# Patient Record
Sex: MALE | Race: WHITE | NOT HISPANIC OR LATINO | Employment: UNEMPLOYED | URBAN - METROPOLITAN AREA
[De-identification: names, ages, dates, MRNs, and addresses within clinical notes are randomized per-mention and may not be internally consistent; named-entity substitution may affect disease eponyms.]

---

## 2017-07-10 ENCOUNTER — HOSPITAL ENCOUNTER (OUTPATIENT)
Dept: GENERAL RADIOLOGY | Facility: HOSPITAL | Age: 62
Discharge: HOME | End: 2017-07-10

## 2017-09-20 ENCOUNTER — ON CAMPUS - OUTPATIENT (AMBULATORY)
Dept: URBAN - METROPOLITAN AREA HOSPITAL 2 | Facility: HOSPITAL | Age: 62
End: 2017-09-20
Payer: MEDICARE

## 2017-09-20 ENCOUNTER — ON CAMPUS - OUTPATIENT (AMBULATORY)
Dept: URBAN - METROPOLITAN AREA HOSPITAL 2 | Facility: HOSPITAL | Age: 62
End: 2017-09-20

## 2017-09-20 ENCOUNTER — OFFICE (AMBULATORY)
Dept: URBAN - METROPOLITAN AREA CLINIC 64 | Facility: CLINIC | Age: 62
End: 2017-09-20

## 2017-09-20 ENCOUNTER — HOSPITAL ENCOUNTER (OUTPATIENT)
Dept: OTHER | Facility: HOSPITAL | Age: 62
Setting detail: SPECIMEN
Discharge: HOME | End: 2017-09-20
Attending: INTERNAL MEDICINE | Admitting: INTERNAL MEDICINE

## 2017-09-20 VITALS
DIASTOLIC BLOOD PRESSURE: 73 MMHG | DIASTOLIC BLOOD PRESSURE: 71 MMHG | DIASTOLIC BLOOD PRESSURE: 78 MMHG | SYSTOLIC BLOOD PRESSURE: 118 MMHG | HEART RATE: 91 BPM | SYSTOLIC BLOOD PRESSURE: 94 MMHG | OXYGEN SATURATION: 97 % | SYSTOLIC BLOOD PRESSURE: 107 MMHG | HEART RATE: 97 BPM | RESPIRATION RATE: 19 BRPM | HEART RATE: 105 BPM | HEART RATE: 98 BPM | RESPIRATION RATE: 19 BRPM | WEIGHT: 145.2 LBS | HEART RATE: 84 BPM | DIASTOLIC BLOOD PRESSURE: 71 MMHG | DIASTOLIC BLOOD PRESSURE: 58 MMHG | DIASTOLIC BLOOD PRESSURE: 67 MMHG | HEART RATE: 82 BPM | DIASTOLIC BLOOD PRESSURE: 73 MMHG | SYSTOLIC BLOOD PRESSURE: 118 MMHG | SYSTOLIC BLOOD PRESSURE: 94 MMHG | RESPIRATION RATE: 15 BRPM | TEMPERATURE: 97.9 F | SYSTOLIC BLOOD PRESSURE: 105 MMHG | HEART RATE: 82 BPM | SYSTOLIC BLOOD PRESSURE: 119 MMHG | SYSTOLIC BLOOD PRESSURE: 119 MMHG | OXYGEN SATURATION: 97 % | OXYGEN SATURATION: 99 % | SYSTOLIC BLOOD PRESSURE: 118 MMHG | DIASTOLIC BLOOD PRESSURE: 68 MMHG | OXYGEN SATURATION: 99 % | SYSTOLIC BLOOD PRESSURE: 107 MMHG | HEART RATE: 97 BPM | RESPIRATION RATE: 16 BRPM | RESPIRATION RATE: 18 BRPM | OXYGEN SATURATION: 96 % | OXYGEN SATURATION: 98 % | RESPIRATION RATE: 15 BRPM | HEART RATE: 105 BPM | OXYGEN SATURATION: 100 % | HEART RATE: 105 BPM | OXYGEN SATURATION: 100 % | DIASTOLIC BLOOD PRESSURE: 68 MMHG | DIASTOLIC BLOOD PRESSURE: 67 MMHG | DIASTOLIC BLOOD PRESSURE: 68 MMHG | DIASTOLIC BLOOD PRESSURE: 77 MMHG | OXYGEN SATURATION: 100 % | OXYGEN SATURATION: 98 % | SYSTOLIC BLOOD PRESSURE: 114 MMHG | DIASTOLIC BLOOD PRESSURE: 71 MMHG | DIASTOLIC BLOOD PRESSURE: 69 MMHG | SYSTOLIC BLOOD PRESSURE: 94 MMHG | HEART RATE: 84 BPM | OXYGEN SATURATION: 98 % | RESPIRATION RATE: 18 BRPM | OXYGEN SATURATION: 96 % | SYSTOLIC BLOOD PRESSURE: 107 MMHG | WEIGHT: 145.2 LBS | RESPIRATION RATE: 18 BRPM | HEIGHT: 71 IN | SYSTOLIC BLOOD PRESSURE: 114 MMHG | OXYGEN SATURATION: 96 % | SYSTOLIC BLOOD PRESSURE: 108 MMHG | SYSTOLIC BLOOD PRESSURE: 119 MMHG | RESPIRATION RATE: 16 BRPM | OXYGEN SATURATION: 99 % | HEART RATE: 91 BPM | HEART RATE: 97 BPM | HEART RATE: 91 BPM | DIASTOLIC BLOOD PRESSURE: 77 MMHG | SYSTOLIC BLOOD PRESSURE: 108 MMHG | DIASTOLIC BLOOD PRESSURE: 67 MMHG | SYSTOLIC BLOOD PRESSURE: 106 MMHG | SYSTOLIC BLOOD PRESSURE: 108 MMHG | HEART RATE: 84 BPM | HEIGHT: 71 IN | HEART RATE: 80 BPM | DIASTOLIC BLOOD PRESSURE: 73 MMHG | TEMPERATURE: 97.9 F | SYSTOLIC BLOOD PRESSURE: 106 MMHG | RESPIRATION RATE: 15 BRPM | SYSTOLIC BLOOD PRESSURE: 114 MMHG | OXYGEN SATURATION: 97 % | DIASTOLIC BLOOD PRESSURE: 78 MMHG | RESPIRATION RATE: 16 BRPM | HEART RATE: 80 BPM | SYSTOLIC BLOOD PRESSURE: 105 MMHG | HEART RATE: 82 BPM | WEIGHT: 145.2 LBS | RESPIRATION RATE: 19 BRPM | SYSTOLIC BLOOD PRESSURE: 105 MMHG | TEMPERATURE: 97.9 F | DIASTOLIC BLOOD PRESSURE: 58 MMHG | DIASTOLIC BLOOD PRESSURE: 58 MMHG | HEART RATE: 98 BPM | HEIGHT: 71 IN | DIASTOLIC BLOOD PRESSURE: 69 MMHG | DIASTOLIC BLOOD PRESSURE: 69 MMHG | HEART RATE: 80 BPM | HEART RATE: 98 BPM | SYSTOLIC BLOOD PRESSURE: 106 MMHG | DIASTOLIC BLOOD PRESSURE: 77 MMHG | DIASTOLIC BLOOD PRESSURE: 78 MMHG

## 2017-09-20 DIAGNOSIS — R11.0 NAUSEA: ICD-10-CM

## 2017-09-20 DIAGNOSIS — K29.70 GASTRITIS, UNSPECIFIED, WITHOUT BLEEDING: ICD-10-CM

## 2017-09-20 DIAGNOSIS — K22.5 DIVERTICULUM OF ESOPHAGUS, ACQUIRED: ICD-10-CM

## 2017-09-20 DIAGNOSIS — Z12.11 ENCOUNTER FOR SCREENING FOR MALIGNANT NEOPLASM OF COLON: ICD-10-CM

## 2017-09-20 DIAGNOSIS — B96.81 HELICOBACTER PYLORI [H. PYLORI] AS THE CAUSE OF DISEASES CLA: ICD-10-CM

## 2017-09-20 DIAGNOSIS — K90.0 CELIAC DISEASE: ICD-10-CM

## 2017-09-20 DIAGNOSIS — K29.50 UNSPECIFIED CHRONIC GASTRITIS WITHOUT BLEEDING: ICD-10-CM

## 2017-09-20 LAB
GI HISTOLOGY: A. SELECT: (no result)
GI HISTOLOGY: B. SELECT: (no result)
GI HISTOLOGY: C. UNSPECIFIED: (no result)
GI HISTOLOGY: PDF REPORT: (no result)

## 2017-09-20 PROCEDURE — 88305 TISSUE EXAM BY PATHOLOGIST: CPT | Mod: 26

## 2017-09-20 PROCEDURE — 45380 COLONOSCOPY AND BIOPSY: CPT | Mod: PT

## 2017-09-20 PROCEDURE — G0121 COLON CA SCRN NOT HI RSK IND: HCPCS

## 2017-09-20 PROCEDURE — 43239 EGD BIOPSY SINGLE/MULTIPLE: CPT | Mod: 59

## 2017-09-20 PROCEDURE — 88342 IMHCHEM/IMCYTCHM 1ST ANTB: CPT | Mod: 26

## 2017-09-20 RX ADMIN — PROPOFOL: 10 INJECTION, EMULSION INTRAVENOUS at 14:33

## 2018-07-26 ENCOUNTER — HOSPITAL ENCOUNTER (OUTPATIENT)
Dept: LAB | Facility: HOSPITAL | Age: 63
Discharge: HOME | End: 2018-07-26

## 2018-07-26 LAB
ALBUMIN SERPL-MCNC: 3.8 G/DL (ref 3.5–4.8)
ALBUMIN/GLOB SERPL: 2 {RATIO} (ref 1–1.7)
ALP SERPL-CCNC: 66 IU/L (ref 32–91)
ALT SERPL-CCNC: 17 IU/L (ref 17–63)
ANION GAP SERPL CALC-SCNC: 8.5 MMOL/L (ref 10–20)
AST SERPL-CCNC: 19 IU/L (ref 15–41)
BASOPHILS # BLD AUTO: 0 10*3/UL (ref 0–0.2)
BASOPHILS NFR BLD AUTO: 1 % (ref 0–2)
BILIRUB SERPL-MCNC: 0.6 MG/DL (ref 0.3–1.2)
BUN SERPL-MCNC: 6 MG/DL (ref 8–20)
BUN/CREAT SERPL: 6.7 (ref 6.2–20.3)
CALCIUM SERPL-MCNC: 8.9 MG/DL (ref 8.9–10.3)
CHLORIDE SERPL-SCNC: 102 MMOL/L (ref 101–111)
CONV CO2: 26 MMOL/L (ref 22–32)
CONV TOTAL PROTEIN: 5.7 G/DL (ref 6.1–7.9)
CREAT UR-MCNC: 0.9 MG/DL (ref 0.7–1.2)
DIFFERENTIAL METHOD BLD: (no result)
EOSINOPHIL # BLD AUTO: 0.1 10*3/UL (ref 0–0.3)
EOSINOPHIL # BLD AUTO: 1 % (ref 0–3)
ERYTHROCYTE [DISTWIDTH] IN BLOOD BY AUTOMATED COUNT: 14.4 % (ref 11.5–14.5)
GLOBULIN UR ELPH-MCNC: 1.9 G/DL (ref 2.5–3.8)
GLUCOSE SERPL-MCNC: 101 MG/DL (ref 65–99)
HCT VFR BLD AUTO: 36.3 % (ref 40–54)
HGB BLD-MCNC: 12.3 G/DL (ref 14–18)
LYMPHOCYTES # BLD AUTO: 1.9 10*3/UL (ref 0.8–4.8)
LYMPHOCYTES NFR BLD AUTO: 37 % (ref 18–42)
MCH RBC QN AUTO: 31.7 PG (ref 26–32)
MCHC RBC AUTO-ENTMCNC: 34 G/DL (ref 32–36)
MCV RBC AUTO: 93.2 FL (ref 80–94)
MONOCYTES # BLD AUTO: 0.4 10*3/UL (ref 0.1–1.3)
MONOCYTES NFR BLD AUTO: 7 % (ref 2–11)
NEUTROPHILS # BLD AUTO: 2.8 10*3/UL (ref 2.3–8.6)
NEUTROPHILS NFR BLD AUTO: 54 % (ref 50–75)
NRBC BLD AUTO-RTO: 0 /100{WBCS}
NRBC/RBC NFR BLD MANUAL: 0 10*3/UL
PLATELET # BLD AUTO: 158 10*3/UL (ref 150–450)
PMV BLD AUTO: 9.1 FL (ref 7.4–10.4)
POTASSIUM SERPL-SCNC: 4.5 MMOL/L (ref 3.6–5.1)
RBC # BLD AUTO: 3.89 10*6/UL (ref 4.6–6)
SODIUM SERPL-SCNC: 132 MMOL/L (ref 136–144)
WBC # BLD AUTO: 5.2 10*3/UL (ref 4.5–11.5)

## 2020-05-20 ENCOUNTER — OFFICE VISIT (OUTPATIENT)
Dept: NEUROSURGERY | Facility: CLINIC | Age: 65
End: 2020-05-20

## 2020-05-20 VITALS
HEIGHT: 70 IN | DIASTOLIC BLOOD PRESSURE: 79 MMHG | BODY MASS INDEX: 27.77 KG/M2 | SYSTOLIC BLOOD PRESSURE: 149 MMHG | HEART RATE: 89 BPM | WEIGHT: 194 LBS

## 2020-05-20 DIAGNOSIS — M47.12 CERVICAL SPONDYLOSIS WITH MYELOPATHY: ICD-10-CM

## 2020-05-20 DIAGNOSIS — G89.29 CHRONIC BILATERAL LOW BACK PAIN WITH BILATERAL SCIATICA: Primary | ICD-10-CM

## 2020-05-20 DIAGNOSIS — Z98.1 HISTORY OF FUSION OF CERVICAL SPINE: ICD-10-CM

## 2020-05-20 DIAGNOSIS — M54.41 CHRONIC BILATERAL LOW BACK PAIN WITH BILATERAL SCIATICA: Primary | ICD-10-CM

## 2020-05-20 DIAGNOSIS — M54.42 CHRONIC BILATERAL LOW BACK PAIN WITH BILATERAL SCIATICA: Primary | ICD-10-CM

## 2020-05-20 PROCEDURE — 99203 OFFICE O/P NEW LOW 30 MIN: CPT | Performed by: NURSE PRACTITIONER

## 2020-05-20 RX ORDER — FLUTICASONE PROPIONATE 50 MCG
1 SPRAY, SUSPENSION (ML) NASAL DAILY
COMMUNITY
Start: 2020-04-03

## 2020-05-20 RX ORDER — TRAZODONE HYDROCHLORIDE 150 MG/1
TABLET ORAL
COMMUNITY
Start: 2013-06-19 | End: 2021-03-19 | Stop reason: ALTCHOICE

## 2020-05-20 RX ORDER — MIRTAZAPINE 15 MG/1
15 TABLET, FILM COATED ORAL NIGHTLY
COMMUNITY
Start: 2020-04-03

## 2020-05-20 RX ORDER — CITALOPRAM 20 MG/1
20 TABLET ORAL NIGHTLY
COMMUNITY
Start: 2004-07-20

## 2020-05-20 RX ORDER — TIZANIDINE 4 MG/1
4 TABLET ORAL NIGHTLY
COMMUNITY
Start: 2020-04-04

## 2020-05-20 RX ORDER — MELOXICAM 15 MG/1
15 TABLET ORAL DAILY PRN
COMMUNITY
Start: 2020-04-03

## 2020-05-20 RX ORDER — TAMSULOSIN HYDROCHLORIDE 0.4 MG/1
1 CAPSULE ORAL NIGHTLY
COMMUNITY
Start: 2020-04-03

## 2020-05-20 NOTE — PROGRESS NOTES
"Subjective   History of Present Illness: Jersey Celaya is a 64 y.o. male is here today as a transfer of care at the request of Mr Celaya    History of Present Illness     He is a former patient of Dr Ulloa and new to me. Prior records were reviewed. He is here today complaining of low back pain radiating into bilateral buttock and groin, down both legs and into the feet. He says he gets numbness in both legs and feet. He says both great toes will cramp at times. This started with back and right thigh pain but has progressed to involve both legs. This started two or three years ago. He has had chiropractic treatment, physical therapy and pain management over the past three years. He says he has had two series of injections into his low back including ablations, but he did not get any relief of the back or leg symptoms. He takes muscle relaxants and mobic for his back. He denies bowel or bladder dysfunction but he states he does take medication for \"low flow\".   He bends forward for temporary relief of the back and leg pain.  Reviewing records he has an extensive history with the cervical spine.  It appears he had a prior cervical fusion and he states this was with Dr. Ulloa.  He says he has had chronic neck pain since the cervical surgery and has had progressive imbalance more recently.  He also complains of numbness in both hands worse on the right.        The following portions of the patient's history were reviewed and updated as appropriate: allergies, current medications, past family history, past medical history, past social history, past surgical history and problem list.    Review of Systems   Constitutional: Negative for fever.   Cardiovascular: Negative for chest pain.   Gastrointestinal: Negative for abdominal pain.   Genitourinary: Negative for dysuria.   Musculoskeletal: Positive for back pain.   Neurological: Positive for numbness. Negative for weakness and headaches.   All other systems reviewed " "and are negative.      Objective     ./79 (BP Location: Left arm, Patient Position: Sitting, Cuff Size: Adult)   Pulse 89   Ht 177.8 cm (70\")   Wt 88 kg (194 lb)   BMI 27.84 kg/m²    Body mass index is 27.84 kg/m².    Answers for HPI/ROS submitted by the patient on 5/15/2020   Back pain  What is the primary reason for your visit?: Back Pain    Physical Exam   Constitutional: He is oriented to person, place, and time. He appears well-developed and well-nourished.   HENT:   Head: Normocephalic.   Eyes: Pupils are equal, round, and reactive to light. EOM are normal.   Neck: Normal range of motion.   Cardiovascular: Normal rate.   Pulmonary/Chest: Effort normal.   Musculoskeletal: Normal range of motion.   Neurological: He is oriented to person, place, and time. He has an abnormal Romberg Test and an abnormal Tandem Gait Test. Gait normal.   Reflex Scores:       Tricep reflexes are 3+ on the right side and 3+ on the left side.       Bicep reflexes are 3+ on the right side and 3+ on the left side.       Brachioradialis reflexes are 3+ on the right side and 3+ on the left side.       Patellar reflexes are 3+ on the right side and 3+ on the left side.       Achilles reflexes are 2+ on the right side and 2+ on the left side.  Skin: Skin is warm and dry.   Psychiatric: He has a normal mood and affect. His speech is normal.   Vitals reviewed.    Neurologic Exam     Mental Status   Oriented to person, place, and time.   Speech: speech is normal   Level of consciousness: alert    Cranial Nerves     CN III, IV, VI   Pupils are equal, round, and reactive to light.  Extraocular motions are normal.     Sensory Exam   Sensory deficit distribution on right: C7  Sensory deficit distribution on left: C8    Gait, Coordination, and Reflexes     Gait  Gait: normal    Coordination   Romberg: positive  Tandem walking coordination: abnormal    Reflexes   Right brachioradialis: 3+  Left brachioradialis: 3+  Right biceps: 3+  Left " biceps: 3+  Right triceps: 3+  Left triceps: 3+  Right patellar: 3+  Left patellar: 3+  Right achilles: 2+  Left achilles: 2+  Right Thurston: absent  Left Thurston: absent  Right ankle clonus: absent  Left ankle clonus: absent      SLRT Positive bilaterally  Theo's Test negative bilaterally  Tinel's positive at left wrist  Spurling's causes neck pain only      Assessment/Plan   Independent Review of Radiographic Studies:      I personally reviewed the images from the following studies.    Cervical MRI report from [facility] dated October 23, 2018 was read as chronic encephalomalacia the right side the cord at C3-C4 and mild chronic cord compression at that level mild to moderate narrowing of the spinal canal at C4-5 and C5-C6 with prior fusion C3-C6 with corpectomy at C4 and interbody device at C5-C6 and anterior osseous bridging at C6-C7    Lumbar MRI report from [facility] dated October 23, 2018 was read as disc bulge and osteophyte formation with mild bilateral facet and ligament flavum hypertrophy contributing to mild bilateral foraminal narrowing and no significant central stenosis at L4-5, mild facet arthropathy L2-3 and L3-4, no other areas of significant canal or foraminal stenosis identified    I did not have access to the imaging of the above MRIs    Medical Decision Making:      He came in today complaining of the back and leg symptoms, but based on the exam findings we also discussed his neck and arm symptoms.  He had a physical exam documented by a neurosurgeon in 2013 which did not reveal any myelopathic findings however today he has hyperreflexia and abnormal tandem gait.  He does complain of imbalance.  This may all be related to his prior cervical fusion with Dr. Ulloa and the cervical MRI report does report chronic myelomalacia, but he also has progressive arm numbness.  The leg symptoms he describes are consistent with neurogenic claudication which that would not correlate with the lumbar  MRI from 18 months ago.  He has exhausted conservative treatment with chiropractic care, physical therapy and pain management over the past 3 years.  We will update imaging with cervical and lumbar MRIs for comparison to the MRIs in 2018 and bring him back to discuss the results and treatment options.  He was advised today to quit smoking as well.    While in the room and during my examination of the patient I wore a mask and eye protection.  I washed my hands before and after this patient encounter.  The patient was also wearing a mask.    Jersey was seen today for back pain.    Diagnoses and all orders for this visit:    Chronic bilateral low back pain with bilateral sciatica  -     MRI Lumbar Spine Without Contrast; Future    History of fusion of cervical spine  -     MRI Cervical Spine Without Contrast; Future    Cervical spondylosis with myelopathy  -     MRI Cervical Spine Without Contrast; Future      Return for After radiographic tests.

## 2020-06-10 ENCOUNTER — OFFICE VISIT (OUTPATIENT)
Dept: NEUROSURGERY | Facility: CLINIC | Age: 65
End: 2020-06-10

## 2020-06-10 VITALS
HEART RATE: 82 BPM | RESPIRATION RATE: 18 BRPM | SYSTOLIC BLOOD PRESSURE: 114 MMHG | WEIGHT: 190 LBS | HEIGHT: 70 IN | BODY MASS INDEX: 27.2 KG/M2 | DIASTOLIC BLOOD PRESSURE: 72 MMHG

## 2020-06-10 DIAGNOSIS — M47.12 CERVICAL SPONDYLOSIS WITH MYELOPATHY: Primary | ICD-10-CM

## 2020-06-10 PROCEDURE — 99213 OFFICE O/P EST LOW 20 MIN: CPT | Performed by: NEUROLOGICAL SURGERY

## 2020-06-10 RX ORDER — OXYCODONE AND ACETAMINOPHEN 10; 325 MG/1; MG/1
1 TABLET ORAL EVERY 8 HOURS PRN
COMMUNITY
End: 2021-03-19 | Stop reason: ALTCHOICE

## 2020-06-10 NOTE — PROGRESS NOTES
"Subjective   Jersey Celaya is a 64 y.o. male.     Chief Complaint   Patient presents with   • Follow-up     f/y after MRI      Visit Vitals  /72 (BP Location: Right arm, Patient Position: Sitting, Cuff Size: Large Adult)   Pulse 82   Resp 18   Ht 177.8 cm (70\")   Wt 86.2 kg (190 lb)   BMI 27.26 kg/m²       History of Present Illness: Mr Celaya is here today for follow-up.  He still smokes the intense burning and aching in both legs which starts from her back for the most part improved in the legs.  Is aggravated with standing and sitting.  If he walks it does not aggravate condition.  He feels like he is getting stiff in his legs.  He denies any inciting event.  He underwent MRI of the lumbar spine cervical spine.  He states the lumbar epidurals not helped any at all.  He feels like he is getting weaker.    The following portions of the patient's history were reviewed and updated as appropriate: allergies, current medications, past family history, past medical history, past social history, past surgical history and problem list.    Review of Systems         Past Surgical History:   Procedure Laterality Date   • CERVICAL SPINE SURGERY     • INNER EAR SURGERY     • WISDOM TOOTH EXTRACTION         Past Medical History:   Diagnosis Date   • Depression    • Depression    • Low back pain      Social History     Socioeconomic History   • Marital status:      Spouse name: Not on file   • Number of children: Not on file   • Years of education: Not on file   • Highest education level: Not on file   Tobacco Use   • Smoking status: Current Every Day Smoker     Packs/day: 0.50     Types: Cigarettes   • Smokeless tobacco: Never Used   Substance and Sexual Activity   • Alcohol use: Never     Frequency: Never   • Drug use: Never   • Sexual activity: Defer      Family History   Problem Relation Age of Onset   • Cancer Mother    • Cancer Father           Objective   Physical Exam  Neurologic Exam  Ortho " Exam    Thin, well fed, well-developed   alert and oriented by 3  Speech is intact and coherent articulate with good content and production  Cranial nerves III through XII are grossly intact with pupils symmetric and reactive and no gaze paresis or nystagmus  Sensation is intact to soft touch and pinprick  in both upper and lower extremities except patchy sensory loss in the lower extremities and with distal stocking pattern  Motor strength is 5/5 in both upper and lower extremities with no focal motor deficits  Reflexes are 3+ in both upper and lower extremities with no upper motor neuron signs  Gait is nonantalgic  Heel toe walking is intact  No dysmetria or dysdiadochokinesia  Noted to palpation lumbar spine  Decreased range of motion with 65 degrees flexion, 30 degrees extension, 20 degrees    MRI of the cervical spine demonstrates capacious arthrodesis.  There is canal stenosis above the arthrodesis of the C3 level but no significant compression of the spinal cord.  There is mild stenosis of the anterior aspect portion of the spinal cord.  Flexion-extension films motion.  There is evidence of myelomalacia behind the prior arthrodesis at C4-5 level.    Lumbar MRI demonstrates mild spondylitic disease.  There is no significant central neuroforaminal stenosis.      Assessment and Plan: In summary loss Mr. Celaya's presentation.  I do not see any evidence of significant spinal disease which would correlate with his symptoms.  There is clearly no stenosis which would generate claudication.  His cervical spine looks chronic in nature he appears to have a solid arthrodesis from the C4-7 segments.  There is no evidence of adjacent segment disease does not correlate with his presentation.  At this time medications are not working but I am more concerned of a peripheral dysfunction.  His reflexes are very brisk but they are symmetric and I feel this is more related to his prior spinal cord compression.  At this time  will obtain EMG nerve conduction studies and bring him back after this and I see no significant evidence of spinal pathology I will refer him to neurology for evaluation and to the pain possibly discuss spinal cord stimulation.    Problems Addressed this Visit     None

## 2020-06-23 ENCOUNTER — TELEPHONE (OUTPATIENT)
Dept: NEUROSURGERY | Facility: CLINIC | Age: 65
End: 2020-06-23

## 2020-06-23 NOTE — TELEPHONE ENCOUNTER
PATIENT INQUIRING IF PROVIDER STILL WANTS HIM TO COMPLETE A THRO MRI BUT NO ORDER IN CHART REFLECTING THAT. PLEASE CONTACT PATIENT [phone] FOR FURTHER INSTRUCTION

## 2020-06-23 NOTE — TELEPHONE ENCOUNTER
Spoke with patient.  MRI Thoracic Spine was not entertained at the last office visit.  Patient to have EMG/NCV only and return to clinic

## 2020-07-08 ENCOUNTER — PROCEDURE VISIT (OUTPATIENT)
Dept: NEUROLOGY | Facility: CLINIC | Age: 65
End: 2020-07-08

## 2020-07-08 VITALS — TEMPERATURE: 98.4 F

## 2020-07-08 DIAGNOSIS — G60.8 POLYNEUROPATHY, PERIPHERAL SENSORIMOTOR AXONAL: ICD-10-CM

## 2020-07-08 DIAGNOSIS — M47.12 CERVICAL SPONDYLOSIS WITH MYELOPATHY: ICD-10-CM

## 2020-07-08 DIAGNOSIS — M54.50 LOW BACK PAIN, UNSPECIFIED BACK PAIN LATERALITY, UNSPECIFIED CHRONICITY, UNSPECIFIED WHETHER SCIATICA PRESENT: Primary | ICD-10-CM

## 2020-07-08 PROBLEM — F32.A DEPRESSION: Status: ACTIVE | Noted: 2020-07-08

## 2020-07-08 PROBLEM — F41.9 ANXIETY DISORDER: Status: ACTIVE | Noted: 2020-07-08

## 2020-07-08 PROCEDURE — 95910 NRV CNDJ TEST 7-8 STUDIES: CPT | Performed by: PSYCHIATRY & NEUROLOGY

## 2020-07-08 PROCEDURE — 95886 MUSC TEST DONE W/N TEST COMP: CPT | Performed by: PSYCHIATRY & NEUROLOGY

## 2020-07-08 NOTE — PROGRESS NOTES
EMG and Nerve Conduction Studies    The complete report includes the data sheets.     Referring Doctor: Erwin Peterson MD    History: BLE/ Back Pain and Sciatica    Results:    1.  The sural sensory studies showed normal distal latency but reduced amplitude sensory nerve action potentials.  The medial plantar studies were attempted with no sensory nerve action potential recorded.    2.  The left peroneal right tibial and left tibial motor nerve conduction studies were normal.  The right peroneal conduction velocity below and across the fibular head was normal the amplitude of the compound muscle action potential was reduced.    3.  EMG of the muscles examined in the bilateral C5-T1 myotomes was normal except for some chronic neurogenic change in the right extensor digitorum brevis muscle    Impression:    This is an abnormal study.  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  The majority of the motor nerve conduction studies were normal therefore no evidence of generalized motor neuropathy.  The EDB muscle is atrophied with chronic neurogenic change which is of questionable clinical significance.  EMG of the muscles tested in the C5-T1 myotomes was otherwise normal.      Joseph Seipel MD

## 2020-08-31 ENCOUNTER — OFFICE VISIT (OUTPATIENT)
Dept: NEUROSURGERY | Facility: CLINIC | Age: 65
End: 2020-08-31

## 2020-08-31 DIAGNOSIS — G62.9 SENSORIMOTOR NEUROPATHY: Primary | ICD-10-CM

## 2020-08-31 PROCEDURE — 99212 OFFICE O/P EST SF 10 MIN: CPT | Performed by: NEUROLOGICAL SURGERY

## 2020-08-31 NOTE — PROGRESS NOTES
Subjective   eJrsey Celaya is a 64 y.o. male.     Chief Complaint   Patient presents with   • Back Pain     lumbar spine pain with pain into the lower extremities     There were no vitals taken for this visit.    History of Present Illness: Mr. Celaya is here today for tele-visit follow-up.  He states that not much is really changed.  I reviewed the EMG nerve conduction studies and it appears that he has more possible motor sensory neuropathy.  I reviewed his films again and the EMGs did not correlate and I see no evidence of compression or spinal stenosis.  At this time I would like to refer him back over to Dr. Seiple for evaluation of possible underlying motor or sensory neuropathy.  I can see him back on a as needed basis.    The following portions of the patient's history were reviewed and updated as appropriate: allergies, current medications, past family history, past medical history, past social history, past surgical history and problem list.    Review of Systems         Past Surgical History:   Procedure Laterality Date   • CERVICAL SPINE SURGERY     • INNER EAR SURGERY     • WISDOM TOOTH EXTRACTION         Past Medical History:   Diagnosis Date   • Depression    • Depression    • Low back pain      Social History     Socioeconomic History   • Marital status:      Spouse name: Not on file   • Number of children: Not on file   • Years of education: Not on file   • Highest education level: Not on file   Tobacco Use   • Smoking status: Current Every Day Smoker     Packs/day: 0.50     Types: Cigarettes   • Smokeless tobacco: Never Used   Substance and Sexual Activity   • Alcohol use: Never     Frequency: Never   • Drug use: Never   • Sexual activity: Defer      Family History   Problem Relation Age of Onset   • Cancer Mother    • Cancer Father           Objective   Physical Exam  Neurologic Exam  Ortho Exam        Assessment and Plan: See above    I spent 10 minutes with the patient in direct  communication obtaining history, reviewing the films, discussing the pathology and treatment plans.    You have chosen to receive care through a telephone visit. Do you consent to use a telephone visit for your medical care today? Yes      Problems Addressed this Visit     None

## 2021-03-02 ENCOUNTER — TRANSCRIBE ORDERS (OUTPATIENT)
Dept: ADMINISTRATIVE | Facility: HOSPITAL | Age: 66
End: 2021-03-02

## 2021-03-02 DIAGNOSIS — R06.09 DYSPNEA ON EXERTION: Primary | ICD-10-CM

## 2021-03-02 DIAGNOSIS — I25.10 DISEASE OF CARDIOVASCULAR SYSTEM: ICD-10-CM

## 2021-03-15 ENCOUNTER — HOSPITAL ENCOUNTER (OUTPATIENT)
Dept: NUCLEAR MEDICINE | Facility: HOSPITAL | Age: 66
Discharge: HOME | End: 2021-03-15

## 2021-03-15 DIAGNOSIS — R06.09 DYSPNEA ON EXERTION: ICD-10-CM

## 2021-03-15 DIAGNOSIS — I25.10 DISEASE OF CARDIOVASCULAR SYSTEM: ICD-10-CM

## 2021-03-15 LAB
BH CV REST NUCLEAR ISOTOPE DOSE: 7.9 MCI
BH CV STRESS BP STAGE 1: NORMAL
BH CV STRESS BP STAGE 2: NORMAL
BH CV STRESS COMMENTS STAGE 1: NORMAL
BH CV STRESS COMMENTS STAGE 2: NORMAL
BH CV STRESS DOSE REGADENOSON STAGE 1: 0.4
BH CV STRESS DURATION MIN STAGE 1: 0
BH CV STRESS DURATION MIN STAGE 2: 4
BH CV STRESS DURATION SEC STAGE 1: 10
BH CV STRESS DURATION SEC STAGE 2: 0
BH CV STRESS HR STAGE 1: 86
BH CV STRESS HR STAGE 2: 78
BH CV STRESS NUCLEAR ISOTOPE DOSE: 21.9 MCI
BH CV STRESS PROTOCOL 1: NORMAL
BH CV STRESS RECOVERY BP: NORMAL MMHG
BH CV STRESS RECOVERY HR: 78 BPM
BH CV STRESS STAGE 1: 1
BH CV STRESS STAGE 2: 2
LV EF NUC BP: 54 %
MAXIMAL PREDICTED HEART RATE: 156 BPM
PERCENT MAX PREDICTED HR: 55.13 %
STRESS BASELINE BP: NORMAL MMHG
STRESS BASELINE HR: 78 BPM
STRESS PERCENT HR: 65 %
STRESS POST PEAK BP: NORMAL MMHG
STRESS POST PEAK HR: 86 BPM
STRESS TARGET HR: 133 BPM

## 2021-03-15 PROCEDURE — 93016 CV STRESS TEST SUPVJ ONLY: CPT | Performed by: INTERNAL MEDICINE

## 2021-03-15 PROCEDURE — 78452 HT MUSCLE IMAGE SPECT MULT: CPT | Performed by: INTERNAL MEDICINE

## 2021-03-15 PROCEDURE — 93017 CV STRESS TEST TRACING ONLY: CPT

## 2021-03-15 PROCEDURE — 78452 HT MUSCLE IMAGE SPECT MULT: CPT

## 2021-03-15 PROCEDURE — 0 TECHNETIUM SESTAMIBI: Performed by: STUDENT IN AN ORGANIZED HEALTH CARE EDUCATION/TRAINING PROGRAM

## 2021-03-15 PROCEDURE — 93018 CV STRESS TEST I&R ONLY: CPT | Performed by: INTERNAL MEDICINE

## 2021-03-15 PROCEDURE — A9500 TC99M SESTAMIBI: HCPCS | Performed by: STUDENT IN AN ORGANIZED HEALTH CARE EDUCATION/TRAINING PROGRAM

## 2021-03-15 PROCEDURE — 25010000002 REGADENOSON 0.4 MG/5ML SOLUTION: Performed by: STUDENT IN AN ORGANIZED HEALTH CARE EDUCATION/TRAINING PROGRAM

## 2021-03-15 RX ADMIN — TECHNETIUM TC 99M SESTAMIBI 1 DOSE: 1 INJECTION INTRAVENOUS at 08:30

## 2021-03-15 RX ADMIN — TECHNETIUM TC 99M SESTAMIBI 1 DOSE: 1 INJECTION INTRAVENOUS at 09:36

## 2021-03-15 RX ADMIN — REGADENOSON 0.4 MG: 0.08 INJECTION, SOLUTION INTRAVENOUS at 09:36

## 2021-03-18 ENCOUNTER — TELEPHONE (OUTPATIENT)
Dept: CARDIOLOGY | Facility: CLINIC | Age: 66
End: 2021-03-18

## 2021-03-18 DIAGNOSIS — I20.8 ANGINA AT REST (HCC): Primary | ICD-10-CM

## 2021-03-18 DIAGNOSIS — R94.39 ABNORMAL NUCLEAR STRESS TEST: ICD-10-CM

## 2021-03-18 PROBLEM — I20.89 ANGINA AT REST: Status: ACTIVE | Noted: 2021-03-18

## 2021-03-18 NOTE — TELEPHONE ENCOUNTER
Dr. Thapa's office calling asking about scheduling a heart cath. Pt has abnormal stress test. Please advise

## 2021-03-18 NOTE — TELEPHONE ENCOUNTER
Please have patient see me in my office tomorrow and then scheduled for a cardiac cath on Monday.  Placed orders

## 2021-03-19 ENCOUNTER — OFFICE VISIT (OUTPATIENT)
Dept: CARDIOLOGY | Facility: CLINIC | Age: 66
End: 2021-03-19

## 2021-03-19 ENCOUNTER — HOSPITAL ENCOUNTER (OUTPATIENT)
Dept: GENERAL RADIOLOGY | Facility: HOSPITAL | Age: 66
Discharge: HOME | End: 2021-03-19

## 2021-03-19 ENCOUNTER — LAB (OUTPATIENT)
Dept: LAB | Facility: HOSPITAL | Age: 66
End: 2021-03-19

## 2021-03-19 ENCOUNTER — HOSPITAL ENCOUNTER (OUTPATIENT)
Dept: CARDIOLOGY | Facility: HOSPITAL | Age: 66
Discharge: HOME | End: 2021-03-19

## 2021-03-19 VITALS
WEIGHT: 185 LBS | HEART RATE: 84 BPM | BODY MASS INDEX: 26.48 KG/M2 | DIASTOLIC BLOOD PRESSURE: 90 MMHG | OXYGEN SATURATION: 100 % | SYSTOLIC BLOOD PRESSURE: 145 MMHG | HEIGHT: 70 IN | TEMPERATURE: 97.7 F

## 2021-03-19 DIAGNOSIS — R94.39 ABNORMAL NUCLEAR STRESS TEST: ICD-10-CM

## 2021-03-19 DIAGNOSIS — R06.02 SHORTNESS OF BREATH: ICD-10-CM

## 2021-03-19 DIAGNOSIS — E78.00 PURE HYPERCHOLESTEROLEMIA: ICD-10-CM

## 2021-03-19 DIAGNOSIS — I20.8 ANGINA AT REST (HCC): ICD-10-CM

## 2021-03-19 DIAGNOSIS — I10 ESSENTIAL HYPERTENSION: ICD-10-CM

## 2021-03-19 DIAGNOSIS — I20.9 ANGINA PECTORIS (HCC): Primary | ICD-10-CM

## 2021-03-19 LAB
ANION GAP SERPL CALCULATED.3IONS-SCNC: 9.2 MMOL/L (ref 5–15)
BASOPHILS # BLD AUTO: 0.02 10*3/MM3 (ref 0–0.2)
BASOPHILS NFR BLD AUTO: 0.2 % (ref 0–1.5)
BUN SERPL-MCNC: 3 MG/DL (ref 8–23)
BUN/CREAT SERPL: 3.7 (ref 7–25)
CALCIUM SPEC-SCNC: 8.8 MG/DL (ref 8.6–10.5)
CHLORIDE SERPL-SCNC: 96 MMOL/L (ref 98–107)
CHOLEST SERPL-MCNC: 91 MG/DL (ref 0–200)
CO2 SERPL-SCNC: 24.8 MMOL/L (ref 22–29)
CREAT SERPL-MCNC: 0.82 MG/DL (ref 0.76–1.27)
DEPRECATED RDW RBC AUTO: 40.2 FL (ref 37–54)
EOSINOPHIL # BLD AUTO: 0.02 10*3/MM3 (ref 0–0.4)
EOSINOPHIL NFR BLD AUTO: 0.2 % (ref 0.3–6.2)
ERYTHROCYTE [DISTWIDTH] IN BLOOD BY AUTOMATED COUNT: 12.2 % (ref 12.3–15.4)
GFR SERPL CREATININE-BSD FRML MDRD: 95 ML/MIN/1.73
GLUCOSE SERPL-MCNC: 102 MG/DL (ref 65–99)
HCT VFR BLD AUTO: 36 % (ref 37.5–51)
HDLC SERPL-MCNC: 33 MG/DL (ref 40–60)
HGB BLD-MCNC: 12.3 G/DL (ref 13–17.7)
IMM GRANULOCYTES # BLD AUTO: 0.04 10*3/MM3 (ref 0–0.05)
IMM GRANULOCYTES NFR BLD AUTO: 0.5 % (ref 0–0.5)
LDLC SERPL CALC-MCNC: 42 MG/DL (ref 0–100)
LDLC/HDLC SERPL: 1.29 {RATIO}
LYMPHOCYTES # BLD AUTO: 1.34 10*3/MM3 (ref 0.7–3.1)
LYMPHOCYTES NFR BLD AUTO: 16.6 % (ref 19.6–45.3)
MCH RBC QN AUTO: 31.4 PG (ref 26.6–33)
MCHC RBC AUTO-ENTMCNC: 34.2 G/DL (ref 31.5–35.7)
MCV RBC AUTO: 91.8 FL (ref 79–97)
MONOCYTES # BLD AUTO: 0.8 10*3/MM3 (ref 0.1–0.9)
MONOCYTES NFR BLD AUTO: 9.9 % (ref 5–12)
NEUTROPHILS NFR BLD AUTO: 5.83 10*3/MM3 (ref 1.7–7)
NEUTROPHILS NFR BLD AUTO: 72.6 % (ref 42.7–76)
NRBC BLD AUTO-RTO: 0 /100 WBC (ref 0–0.2)
PLATELET # BLD AUTO: 281 10*3/MM3 (ref 140–450)
PMV BLD AUTO: 11.6 FL (ref 6–12)
POTASSIUM SERPL-SCNC: 4.7 MMOL/L (ref 3.5–5.2)
RBC # BLD AUTO: 3.92 10*6/MM3 (ref 4.14–5.8)
SODIUM SERPL-SCNC: 130 MMOL/L (ref 136–145)
TRIGL SERPL-MCNC: 77 MG/DL (ref 0–150)
VLDLC SERPL-MCNC: 16 MG/DL (ref 5–40)
WBC # BLD AUTO: 8.05 10*3/MM3 (ref 3.4–10.8)

## 2021-03-19 PROCEDURE — C9803 HOPD COVID-19 SPEC COLLECT: HCPCS

## 2021-03-19 PROCEDURE — U0004 COV-19 TEST NON-CDC HGH THRU: HCPCS

## 2021-03-19 PROCEDURE — 36415 COLL VENOUS BLD VENIPUNCTURE: CPT

## 2021-03-19 PROCEDURE — 80061 LIPID PANEL: CPT

## 2021-03-19 PROCEDURE — 85025 COMPLETE CBC W/AUTO DIFF WBC: CPT

## 2021-03-19 PROCEDURE — 99204 OFFICE O/P NEW MOD 45 MIN: CPT | Performed by: INTERNAL MEDICINE

## 2021-03-19 PROCEDURE — 93010 ELECTROCARDIOGRAM REPORT: CPT | Performed by: INTERNAL MEDICINE

## 2021-03-19 PROCEDURE — 80048 BASIC METABOLIC PNL TOTAL CA: CPT

## 2021-03-19 PROCEDURE — 93005 ELECTROCARDIOGRAM TRACING: CPT | Performed by: INTERNAL MEDICINE

## 2021-03-19 PROCEDURE — 71046 X-RAY EXAM CHEST 2 VIEWS: CPT

## 2021-03-19 RX ORDER — ASPIRIN 81 MG/1
81 TABLET ORAL DAILY
COMMUNITY

## 2021-03-19 RX ORDER — ATORVASTATIN CALCIUM 20 MG/1
20 TABLET, FILM COATED ORAL NIGHTLY
COMMUNITY

## 2021-03-19 NOTE — PROGRESS NOTES
"    Subjective:     Encounter Date:03/19/2021      Patient ID: Jersey Celaya is a 65 y.o. male.    Chief Complaint:  History of Present Illness 65-year-old white male with history of hypertension hyperlipidemia tobacco usage presents to my office for a new consultation.  Patient was having symptoms of chest pain or shortness of breath and had a stress mostly as an outpatient in the hospital which was abnormal with inferior wall ischemia and hence he was sent to my office.  Patient chest pain is mostly substernal without any radiation but associate shortness of breath.  No complains any PND orthopnea.  No palpitations but has some dizziness.  No syncope but has some swelling of the feet.  He was not on any medicines until recently.  He still continues to smoke.  The following portions of the patient's history were reviewed and updated as appropriate: allergies, current medications, past family history, past medical history, past social history, past surgical history and problem list.  Past Medical History:   Diagnosis Date   • Depression    • Depression    • Low back pain      Past Surgical History:   Procedure Laterality Date   • CERVICAL SPINE SURGERY     • INNER EAR SURGERY     • WISDOM TOOTH EXTRACTION       /90   Pulse 84   Temp 97.7 °F (36.5 °C)   Ht 177.8 cm (70\")   Wt 83.9 kg (185 lb)   SpO2 100%   BMI 26.54 kg/m²   Family History   Problem Relation Age of Onset   • Cancer Mother    • Hypertension Mother    • Cancer Father        Current Outpatient Medications:   •  aspirin 81 MG EC tablet, Take 81 mg by mouth Daily., Disp: , Rfl:   •  atorvastatin (LIPITOR) 20 MG tablet, Take 20 mg by mouth Daily., Disp: , Rfl:   •  citalopram (CeleXA) 20 MG tablet, , Disp: , Rfl:   •  fluticasone (FLONASE) 50 MCG/ACT nasal spray, , Disp: , Rfl:   •  meloxicam (MOBIC) 15 MG tablet, , Disp: , Rfl:   •  mirtazapine (REMERON) 15 MG tablet, , Disp: , Rfl:   •  tamsulosin (FLOMAX) 0.4 MG capsule 24 hr capsule, , " Disp: , Rfl:   •  tiZANidine (ZANAFLEX) 4 MG tablet, , Disp: , Rfl:   Allergies   Allergen Reactions   • Pregabalin Swelling     Social History     Socioeconomic History   • Marital status:      Spouse name: Not on file   • Number of children: Not on file   • Years of education: Not on file   • Highest education level: Not on file   Tobacco Use   • Smoking status: Current Every Day Smoker     Packs/day: 0.50     Types: Cigarettes   • Smokeless tobacco: Never Used   Substance and Sexual Activity   • Alcohol use: Never   • Drug use: Never   • Sexual activity: Defer     Review of Systems   Constitutional: Negative for fever and malaise/fatigue.   HENT: Negative for ear pain and nosebleeds.    Eyes: Negative for blurred vision and double vision.   Cardiovascular: Positive for chest pain and leg swelling. Negative for dyspnea on exertion and palpitations.   Respiratory: Positive for shortness of breath. Negative for cough.    Skin: Negative for rash.   Musculoskeletal: Negative for joint pain.   Gastrointestinal: Negative for abdominal pain, nausea and vomiting.   Neurological: Positive for light-headedness and numbness (L arm). Negative for focal weakness and headaches.   Psychiatric/Behavioral: Negative for depression. The patient is not nervous/anxious.    All other systems reviewed and are negative.             Objective:     Constitutional:       Appearance: Well-developed.   Eyes:      General: No scleral icterus.     Conjunctiva/sclera: Conjunctivae normal.   HENT:      Head: Normocephalic and atraumatic.   Neck:      Vascular: No carotid bruit or JVD.   Pulmonary:      Effort: Pulmonary effort is normal.      Breath sounds: Normal breath sounds. No wheezing. No rales.   Cardiovascular:      Normal rate. Regular rhythm.   Pulses:     Intact distal pulses.   Abdominal:      General: Bowel sounds are normal.      Palpations: Abdomen is soft.   Musculoskeletal:      Cervical back: Normal range of motion and  neck supple. Skin:     General: Skin is warm and dry.      Findings: No rash.   Neurological:      Mental Status: Alert.       Procedures    Lab Review:         MDM  1.  Chest pain or shortness of breath  Patient had a stress Myoview which is abnormal with inferior wall ischemia  Patient is having a cardiac catheterization performed.  Discussed with patient about procedure risks and benefits  2.  Hypertension  Patient blood pressure very high and is not on medicines and hence I will start him on beta-blockers  3.  Hyperlipidemia  Patient lipid levels are followed by primary care doctor is on a statin with atorvastatin  4.  Tobacco use and COPD  Patient is advised to stop smoking.

## 2021-03-20 LAB — SARS-COV-2 ORF1AB RESP QL NAA+PROBE: NOT DETECTED

## 2021-03-22 ENCOUNTER — HOSPITAL ENCOUNTER (OUTPATIENT)
Facility: HOSPITAL | Age: 66
Setting detail: HOSPITAL OUTPATIENT SURGERY
Discharge: HOME | End: 2021-03-22
Attending: INTERNAL MEDICINE | Admitting: INTERNAL MEDICINE

## 2021-03-22 VITALS
HEART RATE: 83 BPM | TEMPERATURE: 98 F | DIASTOLIC BLOOD PRESSURE: 71 MMHG | RESPIRATION RATE: 16 BRPM | HEIGHT: 70 IN | WEIGHT: 182.98 LBS | BODY MASS INDEX: 26.2 KG/M2 | OXYGEN SATURATION: 98 % | SYSTOLIC BLOOD PRESSURE: 119 MMHG

## 2021-03-22 DIAGNOSIS — I20.8 ANGINA AT REST (HCC): ICD-10-CM

## 2021-03-22 DIAGNOSIS — R94.39 ABNORMAL NUCLEAR STRESS TEST: ICD-10-CM

## 2021-03-22 PROCEDURE — 99152 MOD SED SAME PHYS/QHP 5/>YRS: CPT | Performed by: INTERNAL MEDICINE

## 2021-03-22 PROCEDURE — 93458 L HRT ARTERY/VENTRICLE ANGIO: CPT | Performed by: INTERNAL MEDICINE

## 2021-03-22 PROCEDURE — 0 IOPAMIDOL PER 1 ML: Performed by: INTERNAL MEDICINE

## 2021-03-22 PROCEDURE — C1894 INTRO/SHEATH, NON-LASER: HCPCS | Performed by: INTERNAL MEDICINE

## 2021-03-22 PROCEDURE — 25010000002 MIDAZOLAM PER 1 MG: Performed by: INTERNAL MEDICINE

## 2021-03-22 PROCEDURE — 25010000002 FENTANYL CITRATE (PF) 100 MCG/2ML SOLUTION: Performed by: INTERNAL MEDICINE

## 2021-03-22 PROCEDURE — C1769 GUIDE WIRE: HCPCS | Performed by: INTERNAL MEDICINE

## 2021-03-22 RX ORDER — ACETAMINOPHEN 325 MG/1
650 TABLET ORAL EVERY 4 HOURS PRN
Status: DISCONTINUED | OUTPATIENT
Start: 2021-03-22 | End: 2021-03-22 | Stop reason: HOSPADM

## 2021-03-22 RX ORDER — OXYCODONE AND ACETAMINOPHEN 10; 325 MG/1; MG/1
1 TABLET ORAL EVERY 8 HOURS PRN
COMMUNITY

## 2021-03-22 RX ORDER — SODIUM CHLORIDE 9 MG/ML
30 INJECTION, SOLUTION INTRAVENOUS CONTINUOUS
Status: DISCONTINUED | OUTPATIENT
Start: 2021-03-22 | End: 2021-03-22 | Stop reason: HOSPADM

## 2021-03-22 RX ORDER — MIDAZOLAM HYDROCHLORIDE 1 MG/ML
INJECTION INTRAMUSCULAR; INTRAVENOUS AS NEEDED
Status: DISCONTINUED | OUTPATIENT
Start: 2021-03-22 | End: 2021-03-22 | Stop reason: HOSPADM

## 2021-03-22 RX ORDER — SODIUM CHLORIDE 9 MG/ML
75 INJECTION, SOLUTION INTRAVENOUS CONTINUOUS
Status: DISCONTINUED | OUTPATIENT
Start: 2021-03-22 | End: 2021-03-22 | Stop reason: HOSPADM

## 2021-03-22 RX ORDER — LIDOCAINE HYDROCHLORIDE 20 MG/ML
INJECTION, SOLUTION INFILTRATION; PERINEURAL AS NEEDED
Status: DISCONTINUED | OUTPATIENT
Start: 2021-03-22 | End: 2021-03-22 | Stop reason: HOSPADM

## 2021-03-22 RX ORDER — SODIUM CHLORIDE 9 MG/ML
250 INJECTION, SOLUTION INTRAVENOUS ONCE AS NEEDED
Status: DISCONTINUED | OUTPATIENT
Start: 2021-03-22 | End: 2021-03-22 | Stop reason: HOSPADM

## 2021-03-22 RX ORDER — FENTANYL CITRATE 50 UG/ML
INJECTION, SOLUTION INTRAMUSCULAR; INTRAVENOUS AS NEEDED
Status: DISCONTINUED | OUTPATIENT
Start: 2021-03-22 | End: 2021-03-22 | Stop reason: HOSPADM

## 2021-03-22 RX ADMIN — SODIUM CHLORIDE 30 ML/HR: 9 INJECTION, SOLUTION INTRAVENOUS at 10:22

## 2021-04-16 LAB — QT INTERVAL: 385 MS

## 2021-05-06 ENCOUNTER — OFFICE VISIT (OUTPATIENT)
Dept: CARDIOLOGY | Facility: CLINIC | Age: 66
End: 2021-05-06

## 2021-05-06 VITALS
HEART RATE: 77 BPM | HEIGHT: 70 IN | BODY MASS INDEX: 25.48 KG/M2 | WEIGHT: 178 LBS | SYSTOLIC BLOOD PRESSURE: 113 MMHG | OXYGEN SATURATION: 99 % | DIASTOLIC BLOOD PRESSURE: 72 MMHG

## 2021-05-06 DIAGNOSIS — I10 ESSENTIAL HYPERTENSION: Primary | ICD-10-CM

## 2021-05-06 DIAGNOSIS — E78.00 PURE HYPERCHOLESTEROLEMIA: ICD-10-CM

## 2021-05-06 DIAGNOSIS — I25.10 CORONARY ARTERY DISEASE INVOLVING NATIVE CORONARY ARTERY OF NATIVE HEART WITHOUT ANGINA PECTORIS: ICD-10-CM

## 2021-05-06 PROCEDURE — 99213 OFFICE O/P EST LOW 20 MIN: CPT | Performed by: INTERNAL MEDICINE

## 2021-05-06 RX ORDER — METOPROLOL SUCCINATE 25 MG/1
25 TABLET, EXTENDED RELEASE ORAL DAILY
COMMUNITY
Start: 2021-03-19 | End: 2021-06-01 | Stop reason: SDUPTHER

## 2021-05-06 NOTE — PROGRESS NOTES
"    Subjective:     Encounter Date:05/06/2021      Patient ID: Jersey Celaya is a 65 y.o. male.    Chief Complaint:  History of Present Illness 65-year-old white male with history of coronary disease hypertension hyperlipidemia presents to my office for follow-up.  Patient is currently stable without any symptoms of chest pain but has some shortness of breath with exertion.  No complains any PND orthopnea.  No palpitation dizziness syncope or swelling of the feet.  Patient has been taking all the medicines regularly.  Patient does not exercise very well.  He Still continues to smoke    The following portions of the patient's history were reviewed and updated as appropriate: allergies, current medications, past family history, past medical history, past social history, past surgical history and problem list.  Past Medical History:   Diagnosis Date   • Depression    • Depression    • Low back pain      Past Surgical History:   Procedure Laterality Date   • CARDIAC CATHETERIZATION N/A 3/22/2021    Procedure: Left Heart Cath with angiogram;  Surgeon: Mack French MD;  Location: [facility] CATH INVASIVE LOCATION;  Service: Cardiovascular;  Laterality: N/A;   • CARDIAC CATHETERIZATION N/A 3/22/2021    Procedure: Left ventriculography;  Surgeon: Mack French MD;  Location: [facility] CATH INVASIVE LOCATION;  Service: Cardiovascular;  Laterality: N/A;   • CERVICAL SPINE SURGERY     • INNER EAR SURGERY     • WISDOM TOOTH EXTRACTION       /72 (BP Location: Right arm, Patient Position: Sitting, Cuff Size: Large Adult)   Pulse 77   Ht 177.8 cm (70\")   Wt 80.7 kg (178 lb)   SpO2 99%   BMI 25.54 kg/m²   Family History   Problem Relation Age of Onset   • Cancer Mother    • Hypertension Mother    • Cancer Father        Current Outpatient Medications:   •  aspirin 81 MG EC tablet, Take 81 mg by mouth Daily., Disp: , Rfl:   •  atorvastatin (LIPITOR) 20 MG tablet, Take 20 mg by mouth Every Night., Disp: , Rfl:   •  " fluticasone (FLONASE) 50 MCG/ACT nasal spray, 1 spray into the nostril(s) as directed by provider Daily., Disp: , Rfl:   •  meloxicam (MOBIC) 15 MG tablet, Take 15 mg by mouth Daily As Needed., Disp: , Rfl:   •  metoprolol succinate XL (TOPROL-XL) 25 MG 24 hr tablet, Take 25 mg by mouth Daily., Disp: , Rfl:   •  mirtazapine (REMERON) 15 MG tablet, Take 15 mg by mouth Every Night., Disp: , Rfl:   •  oxyCODONE-acetaminophen (PERCOCET)  MG per tablet, Take 1 tablet by mouth Every 8 (Eight) Hours As Needed for Moderate Pain ., Disp: , Rfl:   •  tamsulosin (FLOMAX) 0.4 MG capsule 24 hr capsule, Take 1 capsule by mouth Every Night., Disp: , Rfl:   •  tiZANidine (ZANAFLEX) 4 MG tablet, Take 4 mg by mouth Every Night., Disp: , Rfl:   •  citalopram (CeleXA) 20 MG tablet, Take 20 mg by mouth Every Night., Disp: , Rfl:   Allergies   Allergen Reactions   • Pregabalin Swelling   • Gluten Meal GI Intolerance     Social History     Socioeconomic History   • Marital status: Single     Spouse name: Not on file   • Number of children: Not on file   • Years of education: Not on file   • Highest education level: Not on file   Tobacco Use   • Smoking status: Current Every Day Smoker     Packs/day: 0.50     Types: Cigarettes   • Smokeless tobacco: Never Used   Substance and Sexual Activity   • Alcohol use: Never   • Drug use: Never   • Sexual activity: Defer     Review of Systems   Constitutional: Negative for chills, fever, malaise/fatigue, weight gain and weight loss.   Cardiovascular: Negative for chest pain, dyspnea on exertion, leg swelling, palpitations and syncope.   Respiratory: Positive for shortness of breath. Negative for cough.    Hematologic/Lymphatic: Negative for bleeding problem.   Skin: Negative for rash.   Gastrointestinal: Negative for abdominal pain, nausea and vomiting.   Neurological: Negative for dizziness, focal weakness, headaches, light-headedness and numbness.   All other systems reviewed and are  negative.             Objective:     Constitutional:       Appearance: Well-developed.   Eyes:      General: No scleral icterus.     Conjunctiva/sclera: Conjunctivae normal.   HENT:      Head: Normocephalic and atraumatic.   Neck:      Vascular: No carotid bruit or JVD.   Pulmonary:      Effort: Pulmonary effort is normal.      Breath sounds: Normal breath sounds. No wheezing. No rales.   Cardiovascular:      Normal rate. Regular rhythm.   Pulses:     Intact distal pulses.   Abdominal:      General: Bowel sounds are normal.      Palpations: Abdomen is soft.   Musculoskeletal:      Cervical back: Normal range of motion and neck supple. Skin:     General: Skin is warm and dry.      Findings: No rash.   Neurological:      Mental Status: Alert.       Procedures    Lab Review:         MDM #1 coronary disease  Patient has nonobstructive disease with a 50% disease in the LAD and a 34 disease RCA with normal be systolic function is currently stable medications  Patient is advised to stop smoking  2.  Hypertension  Patient blood pressures currently stable on metoprolol  3.  Hyperlipidemia  Patient lipid levels are followed by the primary care doctor and is on Lipitor

## 2021-06-01 RX ORDER — METOPROLOL SUCCINATE 25 MG/1
25 TABLET, EXTENDED RELEASE ORAL DAILY
Qty: 90 TABLET | Refills: 3 | Status: SHIPPED | OUTPATIENT
Start: 2021-06-01 | End: 2022-01-31 | Stop reason: SDUPTHER

## 2021-12-02 ENCOUNTER — OFFICE VISIT (OUTPATIENT)
Dept: CARDIOLOGY | Facility: CLINIC | Age: 66
End: 2021-12-02

## 2021-12-02 VITALS
BODY MASS INDEX: 23.19 KG/M2 | HEIGHT: 70 IN | DIASTOLIC BLOOD PRESSURE: 77 MMHG | OXYGEN SATURATION: 100 % | HEART RATE: 75 BPM | WEIGHT: 162 LBS | SYSTOLIC BLOOD PRESSURE: 122 MMHG

## 2021-12-02 DIAGNOSIS — I10 ESSENTIAL HYPERTENSION: Primary | ICD-10-CM

## 2021-12-02 DIAGNOSIS — I25.10 CORONARY ARTERY DISEASE INVOLVING NATIVE CORONARY ARTERY OF NATIVE HEART WITHOUT ANGINA PECTORIS: ICD-10-CM

## 2021-12-02 DIAGNOSIS — E78.00 PURE HYPERCHOLESTEROLEMIA: ICD-10-CM

## 2021-12-02 PROCEDURE — 99213 OFFICE O/P EST LOW 20 MIN: CPT | Performed by: INTERNAL MEDICINE

## 2021-12-02 NOTE — PROGRESS NOTES
"    Subjective:     Encounter Date:12/02/2021      Patient ID: Jersey Celaya is a 66 y.o. male.    Chief Complaint:  History of Present Illness 66-year-old white male with history of coronary disease hypertension hyperlipidemia presents to my office for follow-up.  Patient is currently stable without any symptoms of chest pain or shortness of breath at rest on exertion.  No complains any PND orthopnea.  No palpitation dizziness syncope or swelling of the feet.  He is take his medicine regularly he does not exercise very well.  He still continues to smoke.    The following portions of the patient's history were reviewed and updated as appropriate: allergies, current medications, past family history, past medical history, past social history, past surgical history and problem list.  Past Medical History:   Diagnosis Date   • Depression    • Depression    • Low back pain      Past Surgical History:   Procedure Laterality Date   • CARDIAC CATHETERIZATION N/A 3/22/2021    Procedure: Left Heart Cath with angiogram;  Surgeon: Mack French MD;  Location: [facility] CATH INVASIVE LOCATION;  Service: Cardiovascular;  Laterality: N/A;   • CARDIAC CATHETERIZATION N/A 3/22/2021    Procedure: Left ventriculography;  Surgeon: Mack French MD;  Location: [facility] CATH INVASIVE LOCATION;  Service: Cardiovascular;  Laterality: N/A;   • CERVICAL SPINE SURGERY     • INNER EAR SURGERY     • WISDOM TOOTH EXTRACTION       /77 (BP Location: Left arm, Patient Position: Sitting)   Pulse 75   Ht 177.8 cm (70\")   Wt 73.5 kg (162 lb)   SpO2 100%   BMI 23.24 kg/m²   Family History   Problem Relation Age of Onset   • Cancer Mother    • Hypertension Mother    • Cancer Father        Current Outpatient Medications:   •  aspirin 81 MG EC tablet, Take 81 mg by mouth Daily., Disp: , Rfl:   •  atorvastatin (LIPITOR) 20 MG tablet, Take 20 mg by mouth Every Night., Disp: , Rfl:   •  citalopram (CeleXA) 20 MG tablet, Take 20 mg by mouth Every " Night., Disp: , Rfl:   •  fluticasone (FLONASE) 50 MCG/ACT nasal spray, 1 spray into the nostril(s) as directed by provider Daily., Disp: , Rfl:   •  meloxicam (MOBIC) 15 MG tablet, Take 15 mg by mouth Daily As Needed., Disp: , Rfl:   •  metoprolol succinate XL (TOPROL-XL) 25 MG 24 hr tablet, Take 1 tablet by mouth Daily., Disp: 90 tablet, Rfl: 3  •  mirtazapine (REMERON) 15 MG tablet, Take 15 mg by mouth Every Night., Disp: , Rfl:   •  oxyCODONE-acetaminophen (PERCOCET)  MG per tablet, Take 1 tablet by mouth Every 8 (Eight) Hours As Needed for Moderate Pain ., Disp: , Rfl:   •  tamsulosin (FLOMAX) 0.4 MG capsule 24 hr capsule, Take 1 capsule by mouth Every Night., Disp: , Rfl:   •  tiZANidine (ZANAFLEX) 4 MG tablet, Take 4 mg by mouth Every Night., Disp: , Rfl:   Allergies   Allergen Reactions   • Pregabalin Swelling   • Gluten Meal GI Intolerance     Social History     Socioeconomic History   • Marital status: Single   Tobacco Use   • Smoking status: Current Every Day Smoker     Packs/day: 0.50     Types: Cigarettes   • Smokeless tobacco: Never Used   Substance and Sexual Activity   • Alcohol use: Never   • Drug use: Never   • Sexual activity: Defer     Review of Systems   Constitutional: Negative for fever and malaise/fatigue.   Cardiovascular: Negative for chest pain, dyspnea on exertion and palpitations.   Respiratory: Negative for cough and shortness of breath.    Skin: Negative for rash.   Gastrointestinal: Negative for abdominal pain, nausea and vomiting.   Neurological: Negative for focal weakness and headaches.   All other systems reviewed and are negative.             Objective:     Constitutional:       Appearance: Well-developed.   Eyes:      General: No scleral icterus.     Conjunctiva/sclera: Conjunctivae normal.   HENT:      Head: Normocephalic and atraumatic.   Neck:      Vascular: No carotid bruit or JVD.   Pulmonary:      Effort: Pulmonary effort is normal.      Breath sounds: Normal breath  sounds. No wheezing. No rales.   Cardiovascular:      Normal rate. Regular rhythm.   Pulses:     Intact distal pulses.   Abdominal:      General: Bowel sounds are normal.      Palpations: Abdomen is soft.   Musculoskeletal:      Cervical back: Normal range of motion and neck supple. Skin:     General: Skin is warm and dry.      Findings: No rash.   Neurological:      Mental Status: Alert.       Procedures    Lab Review:         [facility]  1.  Coronary disease  Patient has nonobstructive disease now with normal LV systolic function is currently stable on medications  2.  Hypertension  Patient blood pressure currently stable on metoprolol  3.  Hyperlipidemia  He is on Lipitor the lipid levels are well within normal meds.      Patient's previous medical records, labs, and EKG were reviewed and discussed with the patient at today's visit.

## 2022-01-31 RX ORDER — METOPROLOL SUCCINATE 25 MG/1
25 TABLET, EXTENDED RELEASE ORAL DAILY
Qty: 90 TABLET | Refills: 3 | Status: SHIPPED | OUTPATIENT
Start: 2022-01-31 | End: 2022-02-02 | Stop reason: SDUPTHER

## 2022-01-31 NOTE — TELEPHONE ENCOUNTER
Rx Refill Note  Requested Prescriptions     Pending Prescriptions Disp Refills   • metoprolol succinate XL (TOPROL-XL) 25 MG 24 hr tablet 90 tablet 3     Sig: Take 1 tablet by mouth Daily.      Last office visit with prescribing clinician: 12/2/2021      Next office visit with prescribing clinician: 7/14/2022            Josie Calvo MA  01/31/22, 09:59 EST

## 2022-02-02 RX ORDER — METOPROLOL SUCCINATE 25 MG/1
25 TABLET, EXTENDED RELEASE ORAL DAILY
Qty: 90 TABLET | Refills: 3 | Status: SHIPPED | OUTPATIENT
Start: 2022-02-02

## 2022-02-02 NOTE — TELEPHONE ENCOUNTER
Rx Refill Note  Requested Prescriptions     Pending Prescriptions Disp Refills   • metoprolol succinate XL (TOPROL-XL) 25 MG 24 hr tablet 90 tablet 3     Sig: Take 1 tablet by mouth Daily.      Last office visit with prescribing clinician: 12/2/2021      Next office visit with prescribing clinician: 7/14/2022            Clara Link MA  02/02/22, 13:34 EST

## 2023-03-21 RX ORDER — METOPROLOL SUCCINATE 25 MG/1
TABLET, EXTENDED RELEASE ORAL
Qty: 90 TABLET | Refills: 0 | OUTPATIENT
Start: 2023-03-21

## 2023-03-21 NOTE — TELEPHONE ENCOUNTER
Rx Refill Note  Requested Prescriptions     Pending Prescriptions Disp Refills   • metoprolol succinate XL (TOPROL-XL) 25 MG 24 hr tablet [Pharmacy Med Name: METOPROL SUC TAB 25MG ER] 90 tablet 3     Sig: TAKE 1 TABLET DAILY      Last office visit with prescribing clinician: 12/2/2021   Next office visit with prescribing clinician: pt needs appt for refills                       Ethel Arceo MA  03/21/23, 08:01 EDT     Pt hasn't been seen since 2021. Needs appt for refills.

## 2023-03-22 ENCOUNTER — TRANSCRIBE ORDERS (OUTPATIENT)
Dept: ADMINISTRATIVE | Facility: HOSPITAL | Age: 68
End: 2023-03-22
Payer: MEDICARE

## 2023-03-22 DIAGNOSIS — R42 EPISODIC LIGHTHEADEDNESS: Primary | ICD-10-CM

## 2023-10-02 ENCOUNTER — TELEPHONE (OUTPATIENT)
Dept: CARDIOLOGY | Facility: CLINIC | Age: 68
End: 2023-10-02
Payer: MEDICARE

## 2023-10-02 NOTE — TELEPHONE ENCOUNTER
Caller: Jersey Celaya    Relationship to patient: Self    Best call back number: [phone]    Chief complaint:     Type of visit: FOLLOW UP     Requested date: 01.02.2024     If rescheduling, when is the original appointment:      Additional notes: PATIENT STATED THAT IT IS TIME TO SEE DR. PEREZ. HE WOULD LIKE THE FOLLOW UP THE SAME DAY AS ERICH CLARK BECAUSE HE WILL BE BRING HIM. ERICH CLARK APPOINTMENT IS ON 01.02.2024 AT 2:30PM. PLEASE CONTACT PATIENT TO SCHEDULE. THANK YOU.

## 2024-01-01 ENCOUNTER — APPOINTMENT (OUTPATIENT)
Dept: GENERAL RADIOLOGY | Facility: HOSPITAL | Age: 69
DRG: 054 | End: 2024-01-01
Payer: MEDICARE

## 2024-01-01 ENCOUNTER — HOSPITAL ENCOUNTER (INPATIENT)
Facility: HOSPITAL | Age: 69
LOS: 2 days | End: 2024-02-22
Attending: INTERNAL MEDICINE | Admitting: INTERNAL MEDICINE
Payer: OTHER MISCELLANEOUS

## 2024-01-01 ENCOUNTER — APPOINTMENT (OUTPATIENT)
Dept: MRI IMAGING | Facility: HOSPITAL | Age: 69
DRG: 054 | End: 2024-01-01
Payer: MEDICARE

## 2024-01-01 ENCOUNTER — APPOINTMENT (OUTPATIENT)
Dept: CT IMAGING | Facility: HOSPITAL | Age: 69
DRG: 054 | End: 2024-01-01
Payer: MEDICARE

## 2024-01-01 ENCOUNTER — HOSPITAL ENCOUNTER (INPATIENT)
Facility: HOSPITAL | Age: 69
LOS: 7 days | Discharge: HOSPICE | DRG: 054 | End: 2024-02-20
Attending: EMERGENCY MEDICINE | Admitting: INTERNAL MEDICINE
Payer: MEDICARE

## 2024-01-01 VITALS
SYSTOLIC BLOOD PRESSURE: 92 MMHG | RESPIRATION RATE: 18 BRPM | HEART RATE: 117 BPM | OXYGEN SATURATION: 84 % | TEMPERATURE: 97.2 F | DIASTOLIC BLOOD PRESSURE: 62 MMHG

## 2024-01-01 VITALS
HEIGHT: 71 IN | DIASTOLIC BLOOD PRESSURE: 62 MMHG | RESPIRATION RATE: 18 BRPM | WEIGHT: 120.81 LBS | SYSTOLIC BLOOD PRESSURE: 99 MMHG | BODY MASS INDEX: 16.91 KG/M2 | OXYGEN SATURATION: 90 % | TEMPERATURE: 97.6 F | HEART RATE: 83 BPM

## 2024-01-01 DIAGNOSIS — R41.82 ALTERED MENTAL STATUS, UNSPECIFIED ALTERED MENTAL STATUS TYPE: Primary | ICD-10-CM
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
## 2024-01-01 DIAGNOSIS — T79.6XXA TRAUMATIC RHABDOMYOLYSIS, INITIAL ENCOUNTER: ICD-10-CM

## 2024-01-01 LAB
ALBUMIN SERPL-MCNC: 3.2 G/DL (ref 3.5–5.2)
ALBUMIN SERPL-MCNC: 3.3 G/DL (ref 3.5–5.2)
ALBUMIN SERPL-MCNC: 3.4 G/DL (ref 3.5–5.2)
ALBUMIN SERPL-MCNC: 3.5 G/DL (ref 3.5–5.2)
ALBUMIN SERPL-MCNC: 3.7 G/DL (ref 3.5–5.2)
ALBUMIN SERPL-MCNC: 3.7 G/DL (ref 3.5–5.2)
ALBUMIN SERPL-MCNC: 3.8 G/DL (ref 3.5–5.2)
ALBUMIN/GLOB SERPL: 1.5 G/DL
ALBUMIN/GLOB SERPL: 1.6 G/DL
ALBUMIN/GLOB SERPL: 1.7 G/DL
ALBUMIN/GLOB SERPL: 1.9 G/DL
ALBUMIN/GLOB SERPL: 2 G/DL
ALBUMIN/GLOB SERPL: 2.1 G/DL
ALBUMIN/GLOB SERPL: 2.2 G/DL
ALP SERPL-CCNC: 103 U/L (ref 39–117)
ALP SERPL-CCNC: 121 U/L (ref 39–117)
ALP SERPL-CCNC: 143 U/L (ref 39–117)
ALP SERPL-CCNC: 148 U/L (ref 39–117)
ALP SERPL-CCNC: 168 U/L (ref 39–117)
ALP SERPL-CCNC: 199 U/L (ref 39–117)
ALP SERPL-CCNC: 213 U/L (ref 39–117)
ALP SERPL-CCNC: 96 U/L (ref 39–117)
ALP SERPL-CCNC: 97 U/L (ref 39–117)
ALT SERPL W P-5'-P-CCNC: 44 U/L (ref 1–41)
ALT SERPL W P-5'-P-CCNC: 50 U/L (ref 1–41)
ALT SERPL W P-5'-P-CCNC: 55 U/L (ref 1–41)
ALT SERPL W P-5'-P-CCNC: 62 U/L (ref 1–41)
ALT SERPL W P-5'-P-CCNC: 70 U/L (ref 1–41)
ALT SERPL W P-5'-P-CCNC: 74 U/L (ref 1–41)
ALT SERPL W P-5'-P-CCNC: 78 U/L (ref 1–41)
ALT SERPL W P-5'-P-CCNC: 96 U/L (ref 1–41)
ALT SERPL W P-5'-P-CCNC: 98 U/L (ref 1–41)
AMPHET+METHAMPHET UR QL: NEGATIVE
ANION GAP SERPL CALCULATED.3IONS-SCNC: 10 MMOL/L (ref 5–15)
ANION GAP SERPL CALCULATED.3IONS-SCNC: 11 MMOL/L (ref 5–15)
ANION GAP SERPL CALCULATED.3IONS-SCNC: 12 MMOL/L (ref 5–15)
ANION GAP SERPL CALCULATED.3IONS-SCNC: 12 MMOL/L (ref 5–15)
ANION GAP SERPL CALCULATED.3IONS-SCNC: 13 MMOL/L (ref 5–15)
ANION GAP SERPL CALCULATED.3IONS-SCNC: 14 MMOL/L (ref 5–15)
ANION GAP SERPL CALCULATED.3IONS-SCNC: 15 MMOL/L (ref 5–15)
ANION GAP SERPL CALCULATED.3IONS-SCNC: 17 MMOL/L (ref 5–15)
ANION GAP SERPL CALCULATED.3IONS-SCNC: 17 MMOL/L (ref 5–15)
ANION GAP SERPL CALCULATED.3IONS-SCNC: 6 MMOL/L (ref 5–15)
ANION GAP SERPL CALCULATED.3IONS-SCNC: 7 MMOL/L (ref 5–15)
ANION GAP SERPL CALCULATED.3IONS-SCNC: 8 MMOL/L (ref 5–15)
ANION GAP SERPL CALCULATED.3IONS-SCNC: 9 MMOL/L (ref 5–15)
ARTERIAL PATENCY WRIST A: POSITIVE
AST SERPL-CCNC: 132 U/L (ref 1–40)
AST SERPL-CCNC: 136 U/L (ref 1–40)
AST SERPL-CCNC: 145 U/L (ref 1–40)
AST SERPL-CCNC: 161 U/L (ref 1–40)
AST SERPL-CCNC: 21 U/L (ref 1–40)
AST SERPL-CCNC: 27 U/L (ref 1–40)
AST SERPL-CCNC: 40 U/L (ref 1–40)
AST SERPL-CCNC: 50 U/L (ref 1–40)
AST SERPL-CCNC: 89 U/L (ref 1–40)
ATMOSPHERIC PRESS: ABNORMAL MM[HG]
BACTERIA UR QL AUTO: ABNORMAL /HPF
BARBITURATES UR QL SCN: NEGATIVE
BASE EXCESS BLDA CALC-SCNC: -4.9 MMOL/L (ref 0–3)
BASOPHILS # BLD AUTO: 0 10*3/MM3 (ref 0–0.2)
BASOPHILS # BLD AUTO: 0.1 10*3/MM3 (ref 0–0.2)
BASOPHILS NFR BLD AUTO: 0 % (ref 0–1.5)
BASOPHILS NFR BLD AUTO: 0.1 % (ref 0–1.5)
BASOPHILS NFR BLD AUTO: 0.1 % (ref 0–1.5)
BASOPHILS NFR BLD AUTO: 0.2 % (ref 0–1.5)
BASOPHILS NFR BLD AUTO: 0.3 % (ref 0–1.5)
BASOPHILS NFR BLD AUTO: 0.5 % (ref 0–1.5)
BDY SITE: ABNORMAL
BENZODIAZ UR QL SCN: NEGATIVE
BILIRUB SERPL-MCNC: 0.4 MG/DL (ref 0–1.2)
BILIRUB SERPL-MCNC: 0.5 MG/DL (ref 0–1.2)
BILIRUB SERPL-MCNC: 0.5 MG/DL (ref 0–1.2)
BILIRUB SERPL-MCNC: 0.6 MG/DL (ref 0–1.2)
BILIRUB SERPL-MCNC: 0.7 MG/DL (ref 0–1.2)
BILIRUB SERPL-MCNC: 0.8 MG/DL (ref 0–1.2)
BILIRUB SERPL-MCNC: 0.9 MG/DL (ref 0–1.2)
BILIRUB UR QL STRIP: NEGATIVE
BUN SERPL-MCNC: 26 MG/DL (ref 8–23)
BUN SERPL-MCNC: 27 MG/DL (ref 8–23)
BUN SERPL-MCNC: 28 MG/DL (ref 8–23)
BUN SERPL-MCNC: 28 MG/DL (ref 8–23)
BUN SERPL-MCNC: 29 MG/DL (ref 8–23)
BUN SERPL-MCNC: 30 MG/DL (ref 8–23)
BUN SERPL-MCNC: 31 MG/DL (ref 8–23)
BUN SERPL-MCNC: 36 MG/DL (ref 8–23)
BUN SERPL-MCNC: 47 MG/DL (ref 8–23)
BUN SERPL-MCNC: 53 MG/DL (ref 8–23)
BUN SERPL-MCNC: 60 MG/DL (ref 8–23)
BUN SERPL-MCNC: 61 MG/DL (ref 8–23)
BUN SERPL-MCNC: 70 MG/DL (ref 8–23)
BUN/CREAT SERPL: 37.5 (ref 7–25)
BUN/CREAT SERPL: 40.3 (ref 7–25)
BUN/CREAT SERPL: 40.6 (ref 7–25)
BUN/CREAT SERPL: 40.8 (ref 7–25)
BUN/CREAT SERPL: 41.4 (ref 7–25)
BUN/CREAT SERPL: 41.5 (ref 7–25)
BUN/CREAT SERPL: 42.6 (ref 7–25)
BUN/CREAT SERPL: 43.1 (ref 7–25)
BUN/CREAT SERPL: 43.3 (ref 7–25)
BUN/CREAT SERPL: 43.9 (ref 7–25)
BUN/CREAT SERPL: 44.8 (ref 7–25)
BUN/CREAT SERPL: 45 (ref 7–25)
BUN/CREAT SERPL: 45.8 (ref 7–25)
BUN/CREAT SERPL: 46.8 (ref 7–25)
BUN/CREAT SERPL: 47.6 (ref 7–25)
BUN/CREAT SERPL: 48.2 (ref 7–25)
BUN/CREAT SERPL: 49.1 (ref 7–25)
BUN/CREAT SERPL: 49.2 (ref 7–25)
BUN/CREAT SERPL: 49.2 (ref 7–25)
BUN/CREAT SERPL: 49.3 (ref 7–25)
BUN/CREAT SERPL: 50 (ref 7–25)
BUN/CREAT SERPL: 50.8 (ref 7–25)
BUN/CREAT SERPL: 64.2 (ref 7–25)
BUN/CREAT SERPL: 65.2 (ref 7–25)
BUN/CREAT SERPL: 65.3 (ref 7–25)
BUN/CREAT SERPL: 66.3 (ref 7–25)
BUN/CREAT SERPL: 70.1 (ref 7–25)
BURR CELLS BLD QL SMEAR: ABNORMAL
C3 FRG RBC-MCNC: ABNORMAL
CA-I BLDA-SCNC: 1.13 MMOL/L (ref 1.15–1.33)
CALCIUM SPEC-SCNC: 6.5 MG/DL (ref 8.6–10.5)
CALCIUM SPEC-SCNC: 8 MG/DL (ref 8.6–10.5)
CALCIUM SPEC-SCNC: 8.1 MG/DL (ref 8.6–10.5)
CALCIUM SPEC-SCNC: 8.2 MG/DL (ref 8.6–10.5)
CALCIUM SPEC-SCNC: 8.2 MG/DL (ref 8.6–10.5)
CALCIUM SPEC-SCNC: 8.3 MG/DL (ref 8.6–10.5)
CALCIUM SPEC-SCNC: 8.4 MG/DL (ref 8.6–10.5)
CALCIUM SPEC-SCNC: 8.5 MG/DL (ref 8.6–10.5)
CALCIUM SPEC-SCNC: 8.6 MG/DL (ref 8.6–10.5)
CALCIUM SPEC-SCNC: 8.7 MG/DL (ref 8.6–10.5)
CALCIUM SPEC-SCNC: 8.8 MG/DL (ref 8.6–10.5)
CALCIUM SPEC-SCNC: 8.9 MG/DL (ref 8.6–10.5)
CALCIUM SPEC-SCNC: 9 MG/DL (ref 8.6–10.5)
CALCIUM SPEC-SCNC: 9 MG/DL (ref 8.6–10.5)
CANNABINOIDS SERPL QL: NEGATIVE
CHLORIDE SERPL-SCNC: 100 MMOL/L (ref 98–107)
CHLORIDE SERPL-SCNC: 101 MMOL/L (ref 98–107)
CHLORIDE SERPL-SCNC: 103 MMOL/L (ref 98–107)
CHLORIDE SERPL-SCNC: 104 MMOL/L (ref 98–107)
CHLORIDE SERPL-SCNC: 106 MMOL/L (ref 98–107)
CHLORIDE SERPL-SCNC: 107 MMOL/L (ref 98–107)
CHLORIDE SERPL-SCNC: 108 MMOL/L (ref 98–107)
CHLORIDE SERPL-SCNC: 108 MMOL/L (ref 98–107)
CHLORIDE SERPL-SCNC: 109 MMOL/L (ref 98–107)
CHLORIDE SERPL-SCNC: 110 MMOL/L (ref 98–107)
CHLORIDE SERPL-SCNC: 111 MMOL/L (ref 98–107)
CHLORIDE SERPL-SCNC: 112 MMOL/L (ref 98–107)
CHLORIDE SERPL-SCNC: 114 MMOL/L (ref 98–107)
CHLORIDE SERPL-SCNC: 117 MMOL/L (ref 98–107)
CHLORIDE SERPL-SCNC: 118 MMOL/L (ref 98–107)
CHLORIDE SERPL-SCNC: 120 MMOL/L (ref 98–107)
CHLORIDE SERPL-SCNC: 125 MMOL/L (ref 98–107)
CHLORIDE SERPL-SCNC: 95 MMOL/L (ref 98–107)
CHLORIDE SERPL-SCNC: 98 MMOL/L (ref 98–107)
CHLORIDE SERPL-SCNC: 99 MMOL/L (ref 98–107)
CHLORIDE SERPL-SCNC: 99 MMOL/L (ref 98–107)
CK SERPL-CCNC: 2755 U/L (ref 20–200)
CK SERPL-CCNC: 4195 U/L (ref 20–200)
CLARITY UR: CLEAR
CO2 SERPL-SCNC: 17 MMOL/L (ref 22–29)
CO2 SERPL-SCNC: 20 MMOL/L (ref 22–29)
CO2 SERPL-SCNC: 20 MMOL/L (ref 22–29)
CO2 SERPL-SCNC: 21 MMOL/L (ref 22–29)
CO2 SERPL-SCNC: 21 MMOL/L (ref 22–29)
CO2 SERPL-SCNC: 22 MMOL/L (ref 22–29)
CO2 SERPL-SCNC: 22 MMOL/L (ref 22–29)
CO2 SERPL-SCNC: 23 MMOL/L (ref 22–29)
CO2 SERPL-SCNC: 24 MMOL/L (ref 22–29)
CO2 SERPL-SCNC: 24 MMOL/L (ref 22–29)
CO2 SERPL-SCNC: 25 MMOL/L (ref 22–29)
CO2 SERPL-SCNC: 26 MMOL/L (ref 22–29)
CO2 SERPL-SCNC: 26 MMOL/L (ref 22–29)
CO2 SERPL-SCNC: 27 MMOL/L (ref 22–29)
CO2 SERPL-SCNC: 28 MMOL/L (ref 22–29)
CO2 SERPL-SCNC: 28 MMOL/L (ref 22–29)
COCAINE UR QL: NEGATIVE
COLOR UR: YELLOW
CREAT BLDA-MCNC: 0.86 MG/DL (ref 0.6–1.3)
CREAT SERPL-MCNC: 0.56 MG/DL (ref 0.76–1.27)
CREAT SERPL-MCNC: 0.57 MG/DL (ref 0.76–1.27)
CREAT SERPL-MCNC: 0.58 MG/DL (ref 0.76–1.27)
CREAT SERPL-MCNC: 0.59 MG/DL (ref 0.76–1.27)
CREAT SERPL-MCNC: 0.6 MG/DL (ref 0.76–1.27)
CREAT SERPL-MCNC: 0.6 MG/DL (ref 0.76–1.27)
CREAT SERPL-MCNC: 0.61 MG/DL (ref 0.76–1.27)
CREAT SERPL-MCNC: 0.61 MG/DL (ref 0.76–1.27)
CREAT SERPL-MCNC: 0.62 MG/DL (ref 0.76–1.27)
CREAT SERPL-MCNC: 0.63 MG/DL (ref 0.76–1.27)
CREAT SERPL-MCNC: 0.64 MG/DL (ref 0.76–1.27)
CREAT SERPL-MCNC: 0.65 MG/DL (ref 0.76–1.27)
CREAT SERPL-MCNC: 0.65 MG/DL (ref 0.76–1.27)
CREAT SERPL-MCNC: 0.66 MG/DL (ref 0.76–1.27)
CREAT SERPL-MCNC: 0.67 MG/DL (ref 0.76–1.27)
CREAT SERPL-MCNC: 0.7 MG/DL (ref 0.76–1.27)
CREAT SERPL-MCNC: 0.71 MG/DL (ref 0.76–1.27)
CREAT SERPL-MCNC: 0.72 MG/DL (ref 0.76–1.27)
CREAT SERPL-MCNC: 0.72 MG/DL (ref 0.76–1.27)
CREAT SERPL-MCNC: 0.73 MG/DL (ref 0.76–1.27)
CREAT SERPL-MCNC: 0.8 MG/DL (ref 0.76–1.27)
CREAT SERPL-MCNC: 0.8 MG/DL (ref 0.76–1.27)
CREAT SERPL-MCNC: 0.87 MG/DL (ref 0.76–1.27)
CREAT SERPL-MCNC: 0.92 MG/DL (ref 0.76–1.27)
CREAT SERPL-MCNC: 1.09 MG/DL (ref 0.76–1.27)
D-LACTATE SERPL-SCNC: 1 MMOL/L (ref 0.2–2)
D-LACTATE SERPL-SCNC: 1.4 MMOL/L (ref 0.3–2)
DACRYOCYTES BLD QL SMEAR: ABNORMAL
DEPRECATED RDW RBC AUTO: 73.1 FL (ref 37–54)
DEPRECATED RDW RBC AUTO: 74.4 FL (ref 37–54)
DEPRECATED RDW RBC AUTO: 74.8 FL (ref 37–54)
DEPRECATED RDW RBC AUTO: 75.3 FL (ref 37–54)
DEPRECATED RDW RBC AUTO: 77 FL (ref 37–54)
DEPRECATED RDW RBC AUTO: 77.4 FL (ref 37–54)
DEPRECATED RDW RBC AUTO: 84.9 FL (ref 37–54)
EGFRCR SERPLBLD CKD-EPI 2021: 100.1 ML/MIN/1.73
EGFRCR SERPLBLD CKD-EPI 2021: 100.1 ML/MIN/1.73
EGFRCR SERPLBLD CKD-EPI 2021: 100.6 ML/MIN/1.73
EGFRCR SERPLBLD CKD-EPI 2021: 101 ML/MIN/1.73
EGFRCR SERPLBLD CKD-EPI 2021: 102.3 ML/MIN/1.73
EGFRCR SERPLBLD CKD-EPI 2021: 102.8 ML/MIN/1.73
EGFRCR SERPLBLD CKD-EPI 2021: 103.3 ML/MIN/1.73
EGFRCR SERPLBLD CKD-EPI 2021: 103.3 ML/MIN/1.73
EGFRCR SERPLBLD CKD-EPI 2021: 103.8 ML/MIN/1.73
EGFRCR SERPLBLD CKD-EPI 2021: 104.3 ML/MIN/1.73
EGFRCR SERPLBLD CKD-EPI 2021: 104.8 ML/MIN/1.73
EGFRCR SERPLBLD CKD-EPI 2021: 105.3 ML/MIN/1.73
EGFRCR SERPLBLD CKD-EPI 2021: 105.3 ML/MIN/1.73
EGFRCR SERPLBLD CKD-EPI 2021: 105.8 ML/MIN/1.73
EGFRCR SERPLBLD CKD-EPI 2021: 105.8 ML/MIN/1.73
EGFRCR SERPLBLD CKD-EPI 2021: 106.3 ML/MIN/1.73
EGFRCR SERPLBLD CKD-EPI 2021: 106.9 ML/MIN/1.73
EGFRCR SERPLBLD CKD-EPI 2021: 107.5 ML/MIN/1.73
EGFRCR SERPLBLD CKD-EPI 2021: 108 ML/MIN/1.73
EGFRCR SERPLBLD CKD-EPI 2021: 74.4 ML/MIN/1.73
EGFRCR SERPLBLD CKD-EPI 2021: 91.2 ML/MIN/1.73
EGFRCR SERPLBLD CKD-EPI 2021: 94.6 ML/MIN/1.73
EGFRCR SERPLBLD CKD-EPI 2021: 94.9 ML/MIN/1.73
EGFRCR SERPLBLD CKD-EPI 2021: 97 ML/MIN/1.73
EGFRCR SERPLBLD CKD-EPI 2021: 97 ML/MIN/1.73
EGFRCR SERPLBLD CKD-EPI 2021: 99.7 ML/MIN/1.73
EOSINOPHIL # BLD AUTO: 0 10*3/MM3 (ref 0–0.4)
EOSINOPHIL NFR BLD AUTO: 0 % (ref 0.3–6.2)
ERYTHROCYTE [DISTWIDTH] IN BLOOD BY AUTOMATED COUNT: 21.1 % (ref 12.3–15.4)
ERYTHROCYTE [DISTWIDTH] IN BLOOD BY AUTOMATED COUNT: 21.6 % (ref 12.3–15.4)
ERYTHROCYTE [DISTWIDTH] IN BLOOD BY AUTOMATED COUNT: 21.7 % (ref 12.3–15.4)
ERYTHROCYTE [DISTWIDTH] IN BLOOD BY AUTOMATED COUNT: 21.7 % (ref 12.3–15.4)
ERYTHROCYTE [DISTWIDTH] IN BLOOD BY AUTOMATED COUNT: 21.9 % (ref 12.3–15.4)
ERYTHROCYTE [DISTWIDTH] IN BLOOD BY AUTOMATED COUNT: 22.3 % (ref 12.3–15.4)
ERYTHROCYTE [DISTWIDTH] IN BLOOD BY AUTOMATED COUNT: 22.6 % (ref 12.3–15.4)
GIANT PLATELETS: ABNORMAL
GLOBULIN UR ELPH-MCNC: 1.6 GM/DL
GLOBULIN UR ELPH-MCNC: 1.6 GM/DL
GLOBULIN UR ELPH-MCNC: 1.7 GM/DL
GLOBULIN UR ELPH-MCNC: 1.8 GM/DL
GLOBULIN UR ELPH-MCNC: 2.2 GM/DL
GLOBULIN UR ELPH-MCNC: 2.2 GM/DL
GLOBULIN UR ELPH-MCNC: 2.3 GM/DL
GLOBULIN UR ELPH-MCNC: 2.3 GM/DL
GLOBULIN UR ELPH-MCNC: 2.4 GM/DL
GLUCOSE BLDC GLUCOMTR-MCNC: 137 MG/DL (ref 70–105)
GLUCOSE BLDC GLUCOMTR-MCNC: 142 MG/DL (ref 70–105)
GLUCOSE BLDC GLUCOMTR-MCNC: 146 MG/DL (ref 70–105)
GLUCOSE BLDC GLUCOMTR-MCNC: 152 MG/DL (ref 70–105)
GLUCOSE BLDC GLUCOMTR-MCNC: 155 MG/DL (ref 70–105)
GLUCOSE BLDC GLUCOMTR-MCNC: 158 MG/DL (ref 70–105)
GLUCOSE BLDC GLUCOMTR-MCNC: 161 MG/DL (ref 70–105)
GLUCOSE BLDC GLUCOMTR-MCNC: 170 MG/DL (ref 70–105)
GLUCOSE BLDC GLUCOMTR-MCNC: 172 MG/DL (ref 70–105)
GLUCOSE BLDC GLUCOMTR-MCNC: 174 MG/DL (ref 70–105)
GLUCOSE BLDC GLUCOMTR-MCNC: 175 MG/DL (ref 70–105)
GLUCOSE BLDC GLUCOMTR-MCNC: 176 MG/DL (ref 70–105)
GLUCOSE BLDC GLUCOMTR-MCNC: 186 MG/DL (ref 70–105)
GLUCOSE BLDC GLUCOMTR-MCNC: 200 MG/DL (ref 70–105)
GLUCOSE BLDC GLUCOMTR-MCNC: 97 MG/DL (ref 74–100)
GLUCOSE BLDC GLUCOMTR-MCNC: 97 MG/DL (ref 74–100)
GLUCOSE SERPL-MCNC: 103 MG/DL (ref 65–99)
GLUCOSE SERPL-MCNC: 116 MG/DL (ref 65–99)
GLUCOSE SERPL-MCNC: 121 MG/DL (ref 65–99)
GLUCOSE SERPL-MCNC: 121 MG/DL (ref 65–99)
GLUCOSE SERPL-MCNC: 125 MG/DL (ref 65–99)
GLUCOSE SERPL-MCNC: 127 MG/DL (ref 65–99)
GLUCOSE SERPL-MCNC: 127 MG/DL (ref 65–99)
GLUCOSE SERPL-MCNC: 128 MG/DL (ref 65–99)
GLUCOSE SERPL-MCNC: 129 MG/DL (ref 65–99)
GLUCOSE SERPL-MCNC: 129 MG/DL (ref 65–99)
GLUCOSE SERPL-MCNC: 135 MG/DL (ref 65–99)
GLUCOSE SERPL-MCNC: 142 MG/DL (ref 65–99)
GLUCOSE SERPL-MCNC: 142 MG/DL (ref 65–99)
GLUCOSE SERPL-MCNC: 150 MG/DL (ref 65–99)
GLUCOSE SERPL-MCNC: 150 MG/DL (ref 65–99)
GLUCOSE SERPL-MCNC: 151 MG/DL (ref 65–99)
GLUCOSE SERPL-MCNC: 151 MG/DL (ref 65–99)
GLUCOSE SERPL-MCNC: 152 MG/DL (ref 65–99)
GLUCOSE SERPL-MCNC: 153 MG/DL (ref 65–99)
GLUCOSE SERPL-MCNC: 156 MG/DL (ref 65–99)
GLUCOSE SERPL-MCNC: 157 MG/DL (ref 65–99)
GLUCOSE SERPL-MCNC: 160 MG/DL (ref 65–99)
GLUCOSE SERPL-MCNC: 161 MG/DL (ref 65–99)
GLUCOSE SERPL-MCNC: 166 MG/DL (ref 65–99)
GLUCOSE SERPL-MCNC: 167 MG/DL (ref 65–99)
GLUCOSE SERPL-MCNC: 170 MG/DL (ref 65–99)
GLUCOSE SERPL-MCNC: 184 MG/DL (ref 65–99)
GLUCOSE UR STRIP-MCNC: NEGATIVE MG/DL
HCO3 BLDA-SCNC: 17.1 MMOL/L (ref 21–28)
HCT VFR BLD AUTO: 29.5 % (ref 37.5–51)
HCT VFR BLD AUTO: 30 % (ref 37.5–51)
HCT VFR BLD AUTO: 31.9 % (ref 37.5–51)
HCT VFR BLD AUTO: 33.4 % (ref 37.5–51)
HCT VFR BLD AUTO: 33.8 % (ref 37.5–51)
HCT VFR BLD AUTO: 34.8 % (ref 37.5–51)
HCT VFR BLD AUTO: 35.6 % (ref 37.5–51)
HCT VFR BLD AUTO: 37.4 % (ref 37.5–51)
HCT VFR BLD AUTO: 38.9 % (ref 37.5–51)
HCT VFR BLDA CALC: 32 % (ref 38–51)
HEMODILUTION: NO
HGB BLD-MCNC: 10.5 G/DL (ref 13–17.7)
HGB BLD-MCNC: 10.5 G/DL (ref 13–17.7)
HGB BLD-MCNC: 10.6 G/DL (ref 13–17.7)
HGB BLD-MCNC: 11 G/DL (ref 13–17.7)
HGB BLD-MCNC: 11 G/DL (ref 13–17.7)
HGB BLD-MCNC: 11.8 G/DL (ref 13–17.7)
HGB BLD-MCNC: 12.2 G/DL (ref 13–17.7)
HGB BLD-MCNC: 9.7 G/DL (ref 13–17.7)
HGB BLD-MCNC: 9.8 G/DL (ref 13–17.7)
HGB BLDA-MCNC: 10.9 G/DL (ref 12–17)
HGB UR QL STRIP.AUTO: ABNORMAL
HYALINE CASTS UR QL AUTO: ABNORMAL /LPF
INHALED O2 CONCENTRATION: 28 %
KETONES UR QL STRIP: ABNORMAL
LEUKOCYTE ESTERASE UR QL STRIP.AUTO: NEGATIVE
LYMPHOCYTES # BLD AUTO: 0.2 10*3/MM3 (ref 0.7–3.1)
LYMPHOCYTES # BLD AUTO: 0.3 10*3/MM3 (ref 0.7–3.1)
LYMPHOCYTES # BLD AUTO: 0.5 10*3/MM3 (ref 0.7–3.1)
LYMPHOCYTES # BLD AUTO: 0.6 10*3/MM3 (ref 0.7–3.1)
LYMPHOCYTES # BLD MANUAL: 0.26 10*3/MM3 (ref 0.7–3.1)
LYMPHOCYTES NFR BLD AUTO: 1.5 % (ref 19.6–45.3)
LYMPHOCYTES NFR BLD AUTO: 1.8 % (ref 19.6–45.3)
LYMPHOCYTES NFR BLD AUTO: 2 % (ref 19.6–45.3)
LYMPHOCYTES NFR BLD AUTO: 2.1 % (ref 19.6–45.3)
LYMPHOCYTES NFR BLD AUTO: 2.2 % (ref 19.6–45.3)
LYMPHOCYTES NFR BLD AUTO: 2.8 % (ref 19.6–45.3)
LYMPHOCYTES NFR BLD AUTO: 4 % (ref 19.6–45.3)
LYMPHOCYTES NFR BLD AUTO: 4.1 % (ref 19.6–45.3)
LYMPHOCYTES NFR BLD MANUAL: 9 % (ref 5–12)
MACROCYTES BLD QL SMEAR: ABNORMAL
MAGNESIUM SERPL-MCNC: 1.9 MG/DL (ref 1.6–2.4)
MAGNESIUM SERPL-MCNC: 2 MG/DL (ref 1.6–2.4)
MAGNESIUM SERPL-MCNC: 2.1 MG/DL (ref 1.6–2.4)
MAGNESIUM SERPL-MCNC: 2.3 MG/DL (ref 1.6–2.4)
MAGNESIUM SERPL-MCNC: 2.4 MG/DL (ref 1.6–2.4)
MAGNESIUM SERPL-MCNC: 2.5 MG/DL (ref 1.6–2.4)
MCH RBC QN AUTO: 32 PG (ref 26.6–33)
MCH RBC QN AUTO: 32.1 PG (ref 26.6–33)
MCH RBC QN AUTO: 32.3 PG (ref 26.6–33)
MCH RBC QN AUTO: 32.5 PG (ref 26.6–33)
MCH RBC QN AUTO: 32.6 PG (ref 26.6–33)
MCH RBC QN AUTO: 33.1 PG (ref 26.6–33)
MCH RBC QN AUTO: 33.5 PG (ref 26.6–33)
MCH RBC QN AUTO: 33.6 PG (ref 26.6–33)
MCH RBC QN AUTO: 33.7 PG (ref 26.6–33)
MCHC RBC AUTO-ENTMCNC: 30.8 G/DL (ref 31.5–35.7)
MCHC RBC AUTO-ENTMCNC: 31.1 G/DL (ref 31.5–35.7)
MCHC RBC AUTO-ENTMCNC: 31.4 G/DL (ref 31.5–35.7)
MCHC RBC AUTO-ENTMCNC: 31.6 G/DL (ref 31.5–35.7)
MCHC RBC AUTO-ENTMCNC: 31.7 G/DL (ref 31.5–35.7)
MCHC RBC AUTO-ENTMCNC: 31.8 G/DL (ref 31.5–35.7)
MCHC RBC AUTO-ENTMCNC: 32.7 G/DL (ref 31.5–35.7)
MCHC RBC AUTO-ENTMCNC: 33 G/DL (ref 31.5–35.7)
MCHC RBC AUTO-ENTMCNC: 33 G/DL (ref 31.5–35.7)
MCV RBC AUTO: 101.4 FL (ref 79–97)
MCV RBC AUTO: 101.9 FL (ref 79–97)
MCV RBC AUTO: 102.1 FL (ref 79–97)
MCV RBC AUTO: 102.3 FL (ref 79–97)
MCV RBC AUTO: 102.5 FL (ref 79–97)
MCV RBC AUTO: 102.7 FL (ref 79–97)
MCV RBC AUTO: 103 FL (ref 79–97)
MCV RBC AUTO: 103.8 FL (ref 79–97)
MCV RBC AUTO: 106.3 FL (ref 79–97)
METAMYELOCYTES NFR BLD MANUAL: 8 % (ref 0–0)
METHADONE UR QL SCN: NEGATIVE
MODALITY: ABNORMAL
MONOCYTES # BLD AUTO: 0.2 10*3/MM3 (ref 0.1–0.9)
MONOCYTES # BLD AUTO: 0.7 10*3/MM3 (ref 0.1–0.9)
MONOCYTES # BLD AUTO: 0.9 10*3/MM3 (ref 0.1–0.9)
MONOCYTES # BLD AUTO: 1 10*3/MM3 (ref 0.1–0.9)
MONOCYTES # BLD AUTO: 1 10*3/MM3 (ref 0.1–0.9)
MONOCYTES # BLD AUTO: 1.1 10*3/MM3 (ref 0.1–0.9)
MONOCYTES # BLD AUTO: 1.2 10*3/MM3 (ref 0.1–0.9)
MONOCYTES # BLD AUTO: 1.9 10*3/MM3 (ref 0.1–0.9)
MONOCYTES # BLD: 1.16 10*3/MM3 (ref 0.1–0.9)
MONOCYTES NFR BLD AUTO: 11.7 % (ref 5–12)
MONOCYTES NFR BLD AUTO: 12.6 % (ref 5–12)
MONOCYTES NFR BLD AUTO: 13.4 % (ref 5–12)
MONOCYTES NFR BLD AUTO: 2.3 % (ref 5–12)
MONOCYTES NFR BLD AUTO: 5 % (ref 5–12)
MONOCYTES NFR BLD AUTO: 6.4 % (ref 5–12)
MONOCYTES NFR BLD AUTO: 8.1 % (ref 5–12)
MONOCYTES NFR BLD AUTO: 9.7 % (ref 5–12)
MRSA DNA SPEC QL NAA+PROBE: NORMAL
NEUTROPHILS # BLD AUTO: 10.45 10*3/MM3 (ref 1.7–7)
NEUTROPHILS NFR BLD AUTO: 10.6 10*3/MM3 (ref 1.7–7)
NEUTROPHILS NFR BLD AUTO: 10.9 10*3/MM3 (ref 1.7–7)
NEUTROPHILS NFR BLD AUTO: 11.5 10*3/MM3 (ref 1.7–7)
NEUTROPHILS NFR BLD AUTO: 12.5 10*3/MM3 (ref 1.7–7)
NEUTROPHILS NFR BLD AUTO: 15.2 10*3/MM3 (ref 1.7–7)
NEUTROPHILS NFR BLD AUTO: 6.7 10*3/MM3 (ref 1.7–7)
NEUTROPHILS NFR BLD AUTO: 6.7 10*3/MM3 (ref 1.7–7)
NEUTROPHILS NFR BLD AUTO: 82.2 % (ref 42.7–76)
NEUTROPHILS NFR BLD AUTO: 84.4 % (ref 42.7–76)
NEUTROPHILS NFR BLD AUTO: 86 % (ref 42.7–76)
NEUTROPHILS NFR BLD AUTO: 86.3 % (ref 42.7–76)
NEUTROPHILS NFR BLD AUTO: 89.6 % (ref 42.7–76)
NEUTROPHILS NFR BLD AUTO: 9.4 10*3/MM3 (ref 1.7–7)
NEUTROPHILS NFR BLD AUTO: 91.5 % (ref 42.7–76)
NEUTROPHILS NFR BLD AUTO: 93.4 % (ref 42.7–76)
NEUTROPHILS NFR BLD AUTO: 95.3 % (ref 42.7–76)
NEUTROPHILS NFR BLD MANUAL: 70 % (ref 42.7–76)
NEUTS BAND NFR BLD MANUAL: 11 % (ref 0–5)
NEUTS VAC BLD QL SMEAR: ABNORMAL
NITRITE UR QL STRIP: NEGATIVE
NRBC BLD AUTO-RTO: 0 /100 WBC (ref 0–0.2)
NRBC BLD AUTO-RTO: 0.1 /100 WBC (ref 0–0.2)
NRBC BLD AUTO-RTO: 0.1 /100 WBC (ref 0–0.2)
NT-PROBNP SERPL-MCNC: 408.6 PG/ML (ref 0–900)
OPIATES UR QL: NEGATIVE
OSMOLALITY SERPL: 288 MOSM/KG (ref 280–301)
OSMOLALITY SERPL: 289 MOSM/KG (ref 280–301)
OSMOLALITY SERPL: 289 MOSM/KG (ref 280–301)
OSMOLALITY SERPL: 290 MOSM/KG (ref 280–301)
OSMOLALITY SERPL: 293 MOSM/KG (ref 280–301)
OSMOLALITY SERPL: 295 MOSM/KG (ref 280–301)
OSMOLALITY SERPL: 301 MOSM/KG (ref 280–301)
OSMOLALITY SERPL: 303 MOSM/KG (ref 280–301)
OSMOLALITY SERPL: 303 MOSM/KG (ref 280–301)
OSMOLALITY SERPL: 307 MOSM/KG (ref 280–301)
OSMOLALITY SERPL: 307 MOSM/KG (ref 280–301)
OSMOLALITY SERPL: 310 MOSM/KG (ref 280–301)
OSMOLALITY SERPL: 311 MOSM/KG (ref 280–301)
OXYCODONE UR QL SCN: NEGATIVE
PCO2 BLDA: 23.1 MM HG (ref 35–48)
PH BLDA: 7.48 PH UNITS (ref 7.35–7.45)
PH UR STRIP.AUTO: 5.5 [PH] (ref 5–8)
PHOSPHATE SERPL-MCNC: 2.1 MG/DL (ref 2.5–4.5)
PHOSPHATE SERPL-MCNC: 2.1 MG/DL (ref 2.5–4.5)
PHOSPHATE SERPL-MCNC: 2.6 MG/DL (ref 2.5–4.5)
PHOSPHATE SERPL-MCNC: 2.9 MG/DL (ref 2.5–4.5)
PHOSPHATE SERPL-MCNC: 3 MG/DL (ref 2.5–4.5)
PHOSPHATE SERPL-MCNC: 3.4 MG/DL (ref 2.5–4.5)
PHOSPHATE SERPL-MCNC: 3.6 MG/DL (ref 2.5–4.5)
PHOSPHATE SERPL-MCNC: 4.2 MG/DL (ref 2.5–4.5)
PHOSPHATE SERPL-MCNC: 4.3 MG/DL (ref 2.5–4.5)
PHOSPHATE SERPL-MCNC: 4.6 MG/DL (ref 2.5–4.5)
PLATELET # BLD AUTO: 127 10*3/MM3 (ref 140–450)
PLATELET # BLD AUTO: 182 10*3/MM3 (ref 140–450)
PLATELET # BLD AUTO: 189 10*3/MM3 (ref 140–450)
PLATELET # BLD AUTO: 196 10*3/MM3 (ref 140–450)
PLATELET # BLD AUTO: 199 10*3/MM3 (ref 140–450)
PLATELET # BLD AUTO: 207 10*3/MM3 (ref 140–450)
PLATELET # BLD AUTO: 217 10*3/MM3 (ref 140–450)
PLATELET # BLD AUTO: 62 10*3/MM3 (ref 140–450)
PLATELET # BLD AUTO: 95 10*3/MM3 (ref 140–450)
PMV BLD AUTO: 10.1 FL (ref 6–12)
PMV BLD AUTO: 10.4 FL (ref 6–12)
PMV BLD AUTO: 10.5 FL (ref 6–12)
PMV BLD AUTO: 10.6 FL (ref 6–12)
PMV BLD AUTO: 11 FL (ref 6–12)
PMV BLD AUTO: 9 FL (ref 6–12)
PMV BLD AUTO: 9.7 FL (ref 6–12)
PO2 BLDA: 132.2 MM HG (ref 83–108)
POIKILOCYTOSIS BLD QL SMEAR: ABNORMAL
POTASSIUM BLDA-SCNC: 3.9 MMOL/L (ref 3.5–4.5)
POTASSIUM SERPL-SCNC: 3.6 MMOL/L (ref 3.5–5.2)
POTASSIUM SERPL-SCNC: 3.6 MMOL/L (ref 3.5–5.2)
POTASSIUM SERPL-SCNC: 3.8 MMOL/L (ref 3.5–5.2)
POTASSIUM SERPL-SCNC: 3.8 MMOL/L (ref 3.5–5.2)
POTASSIUM SERPL-SCNC: 3.9 MMOL/L (ref 3.5–5.2)
POTASSIUM SERPL-SCNC: 4 MMOL/L (ref 3.5–5.2)
POTASSIUM SERPL-SCNC: 4.1 MMOL/L (ref 3.5–5.2)
POTASSIUM SERPL-SCNC: 4.1 MMOL/L (ref 3.5–5.2)
POTASSIUM SERPL-SCNC: 4.2 MMOL/L (ref 3.5–5.2)
POTASSIUM SERPL-SCNC: 4.2 MMOL/L (ref 3.5–5.2)
POTASSIUM SERPL-SCNC: 4.3 MMOL/L (ref 3.5–5.2)
POTASSIUM SERPL-SCNC: 4.4 MMOL/L (ref 3.5–5.2)
POTASSIUM SERPL-SCNC: 4.5 MMOL/L (ref 3.5–5.2)
POTASSIUM SERPL-SCNC: 4.5 MMOL/L (ref 3.5–5.2)
POTASSIUM SERPL-SCNC: 4.6 MMOL/L (ref 3.5–5.2)
POTASSIUM SERPL-SCNC: 4.8 MMOL/L (ref 3.5–5.2)
PROT SERPL-MCNC: 4.8 G/DL (ref 6–8.5)
PROT SERPL-MCNC: 5 G/DL (ref 6–8.5)
PROT SERPL-MCNC: 5.1 G/DL (ref 6–8.5)
PROT SERPL-MCNC: 5.5 G/DL (ref 6–8.5)
PROT SERPL-MCNC: 5.6 G/DL (ref 6–8.5)
PROT SERPL-MCNC: 5.7 G/DL (ref 6–8.5)
PROT SERPL-MCNC: 5.8 G/DL (ref 6–8.5)
PROT SERPL-MCNC: 6.1 G/DL (ref 6–8.5)
PROT SERPL-MCNC: 6.1 G/DL (ref 6–8.5)
PROT UR QL STRIP: ABNORMAL
QT INTERVAL: 295 MS
QT INTERVAL: 316 MS
QT INTERVAL: 405 MS
QT INTERVAL: 414 MS
QT INTERVAL: 420 MS
QTC INTERVAL: 434 MS
QTC INTERVAL: 437 MS
QTC INTERVAL: 458 MS
QTC INTERVAL: 466 MS
QTC INTERVAL: 479 MS
RBC # BLD AUTO: 2.89 10*6/MM3 (ref 4.14–5.8)
RBC # BLD AUTO: 2.93 10*6/MM3 (ref 4.14–5.8)
RBC # BLD AUTO: 3.13 10*6/MM3 (ref 4.14–5.8)
RBC # BLD AUTO: 3.17 10*6/MM3 (ref 4.14–5.8)
RBC # BLD AUTO: 3.27 10*6/MM3 (ref 4.14–5.8)
RBC # BLD AUTO: 3.43 10*6/MM3 (ref 4.14–5.8)
RBC # BLD AUTO: 3.43 10*6/MM3 (ref 4.14–5.8)
RBC # BLD AUTO: 3.64 10*6/MM3 (ref 4.14–5.8)
RBC # BLD AUTO: 3.77 10*6/MM3 (ref 4.14–5.8)
RBC # UR STRIP: ABNORMAL /HPF
REF LAB TEST METHOD: ABNORMAL
SAO2 % BLDCOA: 99.3 % (ref 94–98)
SCAN SLIDE: NORMAL
SMALL PLATELETS BLD QL SMEAR: ABNORMAL
SODIUM BLD-SCNC: 129 MMOL/L (ref 138–146)
SODIUM SERPL-SCNC: 132 MMOL/L (ref 136–145)
SODIUM SERPL-SCNC: 133 MMOL/L (ref 136–145)
SODIUM SERPL-SCNC: 134 MMOL/L (ref 136–145)
SODIUM SERPL-SCNC: 134 MMOL/L (ref 136–145)
SODIUM SERPL-SCNC: 135 MMOL/L (ref 136–145)
SODIUM SERPL-SCNC: 135 MMOL/L (ref 136–145)
SODIUM SERPL-SCNC: 136 MMOL/L (ref 136–145)
SODIUM SERPL-SCNC: 138 MMOL/L (ref 136–145)
SODIUM SERPL-SCNC: 139 MMOL/L (ref 136–145)
SODIUM SERPL-SCNC: 140 MMOL/L (ref 136–145)
SODIUM SERPL-SCNC: 141 MMOL/L (ref 136–145)
SODIUM SERPL-SCNC: 142 MMOL/L (ref 136–145)
SODIUM SERPL-SCNC: 142 MMOL/L (ref 136–145)
SODIUM SERPL-SCNC: 144 MMOL/L (ref 136–145)
SODIUM SERPL-SCNC: 145 MMOL/L (ref 136–145)
SODIUM SERPL-SCNC: 146 MMOL/L (ref 136–145)
SODIUM SERPL-SCNC: 146 MMOL/L (ref 136–145)
SODIUM SERPL-SCNC: 147 MMOL/L (ref 136–145)
SODIUM SERPL-SCNC: 147 MMOL/L (ref 136–145)
SODIUM SERPL-SCNC: 148 MMOL/L (ref 136–145)
SODIUM SERPL-SCNC: 149 MMOL/L (ref 136–145)
SODIUM SERPL-SCNC: 149 MMOL/L (ref 136–145)
SODIUM SERPL-SCNC: 152 MMOL/L (ref 136–145)
SODIUM SERPL-SCNC: 153 MMOL/L (ref 136–145)
SODIUM SERPL-SCNC: 153 MMOL/L (ref 136–145)
SODIUM SERPL-SCNC: 154 MMOL/L (ref 136–145)
SODIUM SERPL-SCNC: 156 MMOL/L (ref 136–145)
SODIUM SERPL-SCNC: 156 MMOL/L (ref 136–145)
SODIUM SERPL-SCNC: 157 MMOL/L (ref 136–145)
SP GR UR STRIP: 1.02 (ref 1–1.03)
SQUAMOUS #/AREA URNS HPF: ABNORMAL /HPF
TARGETS BLD QL SMEAR: ABNORMAL
TOXIC GRANULATION: ABNORMAL
TRANS CELLS #/AREA URNS HPF: ABNORMAL /HPF
TROPONIN T SERPL HS-MCNC: 37 NG/L
UROBILINOGEN UR QL STRIP: ABNORMAL
VARIANT LYMPHS NFR BLD MANUAL: 2 % (ref 19.6–45.3)
WBC # UR STRIP: ABNORMAL /HPF
WBC NRBC COR # BLD AUTO: 12.2 10*3/MM3 (ref 3.4–10.8)
WBC NRBC COR # BLD AUTO: 12.3 10*3/MM3 (ref 3.4–10.8)
WBC NRBC COR # BLD AUTO: 12.9 10*3/MM3 (ref 3.4–10.8)
WBC NRBC COR # BLD AUTO: 13.4 10*3/MM3 (ref 3.4–10.8)
WBC NRBC COR # BLD AUTO: 14 10*3/MM3 (ref 3.4–10.8)
WBC NRBC COR # BLD AUTO: 16.6 10*3/MM3 (ref 3.4–10.8)
WBC NRBC COR # BLD AUTO: 7.8 10*3/MM3 (ref 3.4–10.8)
WBC NRBC COR # BLD AUTO: 8 10*3/MM3 (ref 3.4–10.8)
WBC NRBC COR # BLD AUTO: 9.9 10*3/MM3 (ref 3.4–10.8)
WHOLE BLOOD HOLD COAG: NORMAL
WHOLE BLOOD HOLD SPECIMEN: NORMAL

## 2024-01-01 PROCEDURE — 99232 SBSQ HOSP IP/OBS MODERATE 35: CPT | Performed by: NURSE PRACTITIONER

## 2024-01-01 PROCEDURE — 85025 COMPLETE CBC W/AUTO DIFF WBC: CPT | Performed by: NURSE PRACTITIONER

## 2024-01-01 PROCEDURE — 87641 MR-STAPH DNA AMP PROBE: CPT | Performed by: NURSE PRACTITIONER

## 2024-01-01 PROCEDURE — 25010000002 MORPHINE PER 10 MG: Performed by: NURSE PRACTITIONER

## 2024-01-01 PROCEDURE — 84484 ASSAY OF TROPONIN QUANT: CPT | Performed by: NURSE PRACTITIONER

## 2024-01-01 PROCEDURE — 74018 RADEX ABDOMEN 1 VIEW: CPT

## 2024-01-01 PROCEDURE — 25810000003 SODIUM CHLORIDE 0.9 % SOLUTION: Performed by: NURSE PRACTITIONER

## 2024-01-01 PROCEDURE — 25010000002 LEVETRIRACETAM PER 10 MG: Performed by: NURSE PRACTITIONER

## 2024-01-01 PROCEDURE — 83930 ASSAY OF BLOOD OSMOLALITY: CPT | Performed by: NURSE PRACTITIONER

## 2024-01-01 PROCEDURE — 93005 ELECTROCARDIOGRAM TRACING: CPT | Performed by: INTERNAL MEDICINE

## 2024-01-01 PROCEDURE — 25010000002 DEXAMETHASONE PER 1 MG: Performed by: NURSE PRACTITIONER

## 2024-01-01 PROCEDURE — 84100 ASSAY OF PHOSPHORUS: CPT | Performed by: NURSE PRACTITIONER

## 2024-01-01 PROCEDURE — 25010000002 HYDROMORPHONE 1 MG/ML SOLUTION: Performed by: INTERNAL MEDICINE

## 2024-01-01 PROCEDURE — 99285 EMERGENCY DEPT VISIT HI MDM: CPT

## 2024-01-01 PROCEDURE — 25010000002 HYDROMORPHONE 1 MG/ML SOLUTION: Performed by: NURSE PRACTITIONER

## 2024-01-01 PROCEDURE — P9612 CATHETERIZE FOR URINE SPEC: HCPCS

## 2024-01-01 PROCEDURE — 82565 ASSAY OF CREATININE: CPT

## 2024-01-01 PROCEDURE — 83605 ASSAY OF LACTIC ACID: CPT

## 2024-01-01 PROCEDURE — 94799 UNLISTED PULMONARY SVC/PX: CPT

## 2024-01-01 PROCEDURE — P9041 ALBUMIN (HUMAN),5%, 50ML: HCPCS | Performed by: NURSE PRACTITIONER

## 2024-01-01 PROCEDURE — 99222 1ST HOSP IP/OBS MODERATE 55: CPT | Performed by: INTERNAL MEDICINE

## 2024-01-01 PROCEDURE — 99291 CRITICAL CARE FIRST HOUR: CPT

## 2024-01-01 PROCEDURE — 80053 COMPREHEN METABOLIC PANEL: CPT | Performed by: NURSE PRACTITIONER

## 2024-01-01 PROCEDURE — 25010000002 MIDAZOLAM PER 1 MG: Performed by: INTERNAL MEDICINE

## 2024-01-01 PROCEDURE — 71045 X-RAY EXAM CHEST 1 VIEW: CPT

## 2024-01-01 PROCEDURE — 25010000002 ONDANSETRON PER 1 MG: Performed by: INTERNAL MEDICINE

## 2024-01-01 PROCEDURE — 84295 ASSAY OF SERUM SODIUM: CPT | Performed by: NURSE PRACTITIONER

## 2024-01-01 PROCEDURE — 80307 DRUG TEST PRSMV CHEM ANLYZR: CPT | Performed by: NURSE PRACTITIONER

## 2024-01-01 PROCEDURE — 82948 REAGENT STRIP/BLOOD GLUCOSE: CPT

## 2024-01-01 PROCEDURE — 81001 URINALYSIS AUTO W/SCOPE: CPT | Performed by: NURSE PRACTITIONER

## 2024-01-01 PROCEDURE — 80051 ELECTROLYTE PANEL: CPT

## 2024-01-01 PROCEDURE — 25810000003 SODIUM CHLORIDE 3 % SOLUTION: Performed by: NURSE PRACTITIONER

## 2024-01-01 PROCEDURE — C1751 CATH, INF, PER/CENT/MIDLINE: HCPCS

## 2024-01-01 PROCEDURE — 83735 ASSAY OF MAGNESIUM: CPT | Performed by: NURSE PRACTITIONER

## 2024-01-01 PROCEDURE — 70553 MRI BRAIN STEM W/O & W/DYE: CPT

## 2024-01-01 PROCEDURE — 99232 SBSQ HOSP IP/OBS MODERATE 35: CPT | Performed by: INTERNAL MEDICINE

## 2024-01-01 PROCEDURE — 25010000002 MORPHINE PER 10 MG: Performed by: INTERNAL MEDICINE

## 2024-01-01 PROCEDURE — 25810000003 SODIUM CHLORIDE 3 % SOLUTION: Performed by: INTERNAL MEDICINE

## 2024-01-01 PROCEDURE — 82803 BLOOD GASES ANY COMBINATION: CPT

## 2024-01-01 PROCEDURE — 36415 COLL VENOUS BLD VENIPUNCTURE: CPT | Performed by: NURSE PRACTITIONER

## 2024-01-01 PROCEDURE — 84295 ASSAY OF SERUM SODIUM: CPT | Performed by: INTERNAL MEDICINE

## 2024-01-01 PROCEDURE — 93005 ELECTROCARDIOGRAM TRACING: CPT

## 2024-01-01 PROCEDURE — 84132 ASSAY OF SERUM POTASSIUM: CPT | Performed by: INTERNAL MEDICINE

## 2024-01-01 PROCEDURE — 85007 BL SMEAR W/DIFF WBC COUNT: CPT | Performed by: NURSE PRACTITIONER

## 2024-01-01 PROCEDURE — 82550 ASSAY OF CK (CPK): CPT | Performed by: NURSE PRACTITIONER

## 2024-01-01 PROCEDURE — 70450 CT HEAD/BRAIN W/O DYE: CPT

## 2024-01-01 PROCEDURE — 82948 REAGENT STRIP/BLOOD GLUCOSE: CPT | Performed by: NURSE PRACTITIONER

## 2024-01-01 PROCEDURE — 63710000001 INSULIN LISPRO (HUMAN) PER 5 UNITS: Performed by: NURSE PRACTITIONER

## 2024-01-01 PROCEDURE — 71260 CT THORAX DX C+: CPT

## 2024-01-01 PROCEDURE — 94761 N-INVAS EAR/PLS OXIMETRY MLT: CPT

## 2024-01-01 PROCEDURE — 72125 CT NECK SPINE W/O DYE: CPT

## 2024-01-01 PROCEDURE — 25510000001 IOPAMIDOL PER 1 ML: Performed by: INTERNAL MEDICINE

## 2024-01-01 PROCEDURE — 25010000002 ALBUMIN HUMAN 5% PER 50 ML: Performed by: NURSE PRACTITIONER

## 2024-01-01 PROCEDURE — 83880 ASSAY OF NATRIURETIC PEPTIDE: CPT | Performed by: NURSE PRACTITIONER

## 2024-01-01 PROCEDURE — 99232 SBSQ HOSP IP/OBS MODERATE 35: CPT

## 2024-01-01 PROCEDURE — 70551 MRI BRAIN STEM W/O DYE: CPT

## 2024-01-01 PROCEDURE — 93010 ELECTROCARDIOGRAM REPORT: CPT | Performed by: INTERNAL MEDICINE

## 2024-01-01 PROCEDURE — 25010000002 GADOTERIDOL PER 1 ML: Performed by: EMERGENCY MEDICINE

## 2024-01-01 PROCEDURE — 74177 CT ABD & PELVIS W/CONTRAST: CPT

## 2024-01-01 PROCEDURE — 93005 ELECTROCARDIOGRAM TRACING: CPT | Performed by: EMERGENCY MEDICINE

## 2024-01-01 PROCEDURE — 99221 1ST HOSP IP/OBS SF/LOW 40: CPT | Performed by: NURSE PRACTITIONER

## 2024-01-01 PROCEDURE — A9579 GAD-BASE MR CONTRAST NOS,1ML: HCPCS | Performed by: EMERGENCY MEDICINE

## 2024-01-01 PROCEDURE — 99232 SBSQ HOSP IP/OBS MODERATE 35: CPT | Performed by: NEUROLOGICAL SURGERY

## 2024-01-01 PROCEDURE — 84100 ASSAY OF PHOSPHORUS: CPT | Performed by: INTERNAL MEDICINE

## 2024-01-01 PROCEDURE — 84100 ASSAY OF PHOSPHORUS: CPT

## 2024-01-01 PROCEDURE — 25010000002 GLYCOPYRROLATE 0.2 MG/ML SOLUTION: Performed by: INTERNAL MEDICINE

## 2024-01-01 PROCEDURE — 05HY33Z INSERTION OF INFUSION DEVICE INTO UPPER VEIN, PERCUTANEOUS APPROACH: ICD-10-PCS | Performed by: NURSE PRACTITIONER

## 2024-01-01 PROCEDURE — 93005 ELECTROCARDIOGRAM TRACING: CPT | Performed by: NURSE PRACTITIONER

## 2024-01-01 PROCEDURE — 36600 WITHDRAWAL OF ARTERIAL BLOOD: CPT

## 2024-01-01 PROCEDURE — 85018 HEMOGLOBIN: CPT

## 2024-01-01 PROCEDURE — 25010000002 POTASSIUM CHLORIDE 10 MEQ/100ML SOLUTION: Performed by: INTERNAL MEDICINE

## 2024-01-01 PROCEDURE — 82330 ASSAY OF CALCIUM: CPT

## 2024-01-01 PROCEDURE — 99222 1ST HOSP IP/OBS MODERATE 55: CPT

## 2024-01-01 RX ORDER — POTASSIUM CHLORIDE 1.5 G/1.58G
40 POWDER, FOR SOLUTION ORAL EVERY 4 HOURS
Status: COMPLETED | OUTPATIENT
Start: 2024-01-01 | End: 2024-01-01

## 2024-01-01 RX ORDER — 3% SODIUM CHLORIDE 3 G/100ML
20 INJECTION, SOLUTION INTRAVENOUS CONTINUOUS
Status: DISPENSED | OUTPATIENT
Start: 2024-01-01 | End: 2024-01-01

## 2024-01-01 RX ORDER — SODIUM CHLORIDE 0.9 % (FLUSH) 0.9 %
10 SYRINGE (ML) INJECTION AS NEEDED
Status: DISCONTINUED | OUTPATIENT
Start: 2024-01-01 | End: 2024-01-01 | Stop reason: HOSPADM

## 2024-01-01 RX ORDER — DEXAMETHASONE SODIUM PHOSPHATE 4 MG/ML
10 INJECTION, SOLUTION INTRA-ARTICULAR; INTRALESIONAL; INTRAMUSCULAR; INTRAVENOUS; SOFT TISSUE ONCE
Status: COMPLETED | OUTPATIENT
Start: 2024-01-01 | End: 2024-01-01

## 2024-01-01 RX ORDER — SODIUM CHLORIDE 9 MG/ML
50 INJECTION, SOLUTION INTRAVENOUS CONTINUOUS
Status: DISCONTINUED | OUTPATIENT
Start: 2024-01-01 | End: 2024-01-01

## 2024-01-01 RX ORDER — AMOXICILLIN 250 MG
2 CAPSULE ORAL 2 TIMES DAILY PRN
Status: DISCONTINUED | OUTPATIENT
Start: 2024-01-01 | End: 2024-01-01 | Stop reason: HOSPADM

## 2024-01-01 RX ORDER — GLYCOPYRROLATE 0.2 MG/ML
0.4 INJECTION INTRAMUSCULAR; INTRAVENOUS EVERY 4 HOURS PRN
Status: DISCONTINUED | OUTPATIENT
Start: 2024-01-01 | End: 2024-01-01 | Stop reason: HOSPADM

## 2024-01-01 RX ORDER — ONDANSETRON 4 MG/1
4 TABLET, ORALLY DISINTEGRATING ORAL EVERY 6 HOURS PRN
Status: DISCONTINUED | OUTPATIENT
Start: 2024-01-01 | End: 2024-01-01 | Stop reason: HOSPADM

## 2024-01-01 RX ORDER — BISACODYL 5 MG/1
5 TABLET, DELAYED RELEASE ORAL DAILY PRN
Status: DISCONTINUED | OUTPATIENT
Start: 2024-01-01 | End: 2024-01-01

## 2024-01-01 RX ORDER — MIDAZOLAM HYDROCHLORIDE 1 MG/ML
1 INJECTION INTRAMUSCULAR; INTRAVENOUS
Status: DISCONTINUED | OUTPATIENT
Start: 2024-01-01 | End: 2024-01-01 | Stop reason: HOSPADM

## 2024-01-01 RX ORDER — POLYETHYLENE GLYCOL 3350 17 G/17G
17 POWDER, FOR SOLUTION ORAL DAILY PRN
Status: DISCONTINUED | OUTPATIENT
Start: 2024-01-01 | End: 2024-01-01 | Stop reason: HOSPADM

## 2024-01-01 RX ORDER — LEVETIRACETAM 500 MG/5ML
500 INJECTION, SOLUTION, CONCENTRATE INTRAVENOUS EVERY 12 HOURS SCHEDULED
Status: DISCONTINUED | OUTPATIENT
Start: 2024-01-01 | End: 2024-01-01

## 2024-01-01 RX ORDER — SODIUM CHLORIDE 0.9 % (FLUSH) 0.9 %
10 SYRINGE (ML) INJECTION AS NEEDED
Status: DISCONTINUED | OUTPATIENT
Start: 2024-01-01 | End: 2024-01-01 | Stop reason: SDUPTHER

## 2024-01-01 RX ORDER — SODIUM CHLORIDE 0.9 % (FLUSH) 0.9 %
10 SYRINGE (ML) INJECTION EVERY 12 HOURS SCHEDULED
Status: DISCONTINUED | OUTPATIENT
Start: 2024-01-01 | End: 2024-01-01 | Stop reason: SDUPTHER

## 2024-01-01 RX ORDER — 3% SODIUM CHLORIDE 3 G/100ML
20 INJECTION, SOLUTION INTRAVENOUS CONTINUOUS
Status: DISCONTINUED | OUTPATIENT
Start: 2024-01-01 | End: 2024-01-01 | Stop reason: SDUPTHER

## 2024-01-01 RX ORDER — 3% SODIUM CHLORIDE 3 G/100ML
30 INJECTION, SOLUTION INTRAVENOUS CONTINUOUS
Status: ACTIVE | OUTPATIENT
Start: 2024-01-01 | End: 2024-01-01

## 2024-01-01 RX ORDER — DEXTROSE MONOHYDRATE 25 G/50ML
25 INJECTION, SOLUTION INTRAVENOUS
Status: DISCONTINUED | OUTPATIENT
Start: 2024-01-01 | End: 2024-01-01 | Stop reason: HOSPADM

## 2024-01-01 RX ORDER — MIDAZOLAM HYDROCHLORIDE 1 MG/ML
1 INJECTION INTRAMUSCULAR; INTRAVENOUS EVERY 4 HOURS PRN
Status: DISCONTINUED | OUTPATIENT
Start: 2024-01-01 | End: 2024-01-01

## 2024-01-01 RX ORDER — BISACODYL 10 MG
10 SUPPOSITORY, RECTAL RECTAL DAILY PRN
Status: DISCONTINUED | OUTPATIENT
Start: 2024-01-01 | End: 2024-01-01

## 2024-01-01 RX ORDER — BISACODYL 10 MG
10 SUPPOSITORY, RECTAL RECTAL DAILY PRN
Status: DISCONTINUED | OUTPATIENT
Start: 2024-01-01 | End: 2024-01-01 | Stop reason: HOSPADM

## 2024-01-01 RX ORDER — 3% SODIUM CHLORIDE 3 G/100ML
30 INJECTION, SOLUTION INTRAVENOUS CONTINUOUS
Status: DISPENSED | OUTPATIENT
Start: 2024-01-01 | End: 2024-01-01

## 2024-01-01 RX ORDER — POLYETHYLENE GLYCOL 3350 17 G/17G
17 POWDER, FOR SOLUTION ORAL DAILY PRN
Status: DISCONTINUED | OUTPATIENT
Start: 2024-01-01 | End: 2024-01-01

## 2024-01-01 RX ORDER — SODIUM CHLORIDE 0.9 % (FLUSH) 0.9 %
20 SYRINGE (ML) INJECTION AS NEEDED
Status: DISCONTINUED | OUTPATIENT
Start: 2024-01-01 | End: 2024-01-01 | Stop reason: HOSPADM

## 2024-01-01 RX ORDER — SODIUM CHLORIDE 450 MG/100ML
125 INJECTION, SOLUTION INTRAVENOUS CONTINUOUS
Status: DISCONTINUED | OUTPATIENT
Start: 2024-01-01 | End: 2024-01-01 | Stop reason: HOSPADM

## 2024-01-01 RX ORDER — MORPHINE SULFATE 2 MG/ML
2 INJECTION, SOLUTION INTRAMUSCULAR; INTRAVENOUS EVERY 4 HOURS PRN
Status: DISCONTINUED | OUTPATIENT
Start: 2024-01-01 | End: 2024-01-01

## 2024-01-01 RX ORDER — METOPROLOL TARTRATE 1 MG/ML
5 INJECTION, SOLUTION INTRAVENOUS EVERY 6 HOURS PRN
Status: DISCONTINUED | OUTPATIENT
Start: 2024-01-01 | End: 2024-01-01 | Stop reason: HOSPADM

## 2024-01-01 RX ORDER — MIDAZOLAM HYDROCHLORIDE 1 MG/ML
1 INJECTION INTRAMUSCULAR; INTRAVENOUS EVERY 4 HOURS PRN
Status: DISCONTINUED | OUTPATIENT
Start: 2024-01-01 | End: 2024-01-01 | Stop reason: HOSPADM

## 2024-01-01 RX ORDER — INSULIN LISPRO 100 [IU]/ML
2-9 INJECTION, SOLUTION INTRAVENOUS; SUBCUTANEOUS EVERY 6 HOURS SCHEDULED
Status: DISCONTINUED | OUTPATIENT
Start: 2024-01-01 | End: 2024-01-01

## 2024-01-01 RX ORDER — ONDANSETRON 2 MG/ML
4 INJECTION INTRAMUSCULAR; INTRAVENOUS EVERY 6 HOURS PRN
Status: DISCONTINUED | OUTPATIENT
Start: 2024-01-01 | End: 2024-01-01 | Stop reason: HOSPADM

## 2024-01-01 RX ORDER — LEVETIRACETAM 500 MG/5ML
1000 INJECTION, SOLUTION, CONCENTRATE INTRAVENOUS ONCE
Status: COMPLETED | OUTPATIENT
Start: 2024-01-01 | End: 2024-01-01

## 2024-01-01 RX ORDER — SCOLOPAMINE TRANSDERMAL SYSTEM 1 MG/1
1 PATCH, EXTENDED RELEASE TRANSDERMAL
Status: DISCONTINUED | OUTPATIENT
Start: 2024-01-01 | End: 2024-01-01 | Stop reason: HOSPADM

## 2024-01-01 RX ORDER — SODIUM CHLORIDE 9 MG/ML
40 INJECTION, SOLUTION INTRAVENOUS AS NEEDED
Status: DISCONTINUED | OUTPATIENT
Start: 2024-01-01 | End: 2024-01-01 | Stop reason: HOSPADM

## 2024-01-01 RX ORDER — SODIUM CHLORIDE 0.9 % (FLUSH) 0.9 %
10 SYRINGE (ML) INJECTION EVERY 12 HOURS SCHEDULED
Status: DISCONTINUED | OUTPATIENT
Start: 2024-01-01 | End: 2024-01-01 | Stop reason: HOSPADM

## 2024-01-01 RX ORDER — MIDAZOLAM HYDROCHLORIDE 1 MG/ML
2 INJECTION INTRAMUSCULAR; INTRAVENOUS ONCE
Status: DISCONTINUED | OUTPATIENT
Start: 2024-01-01 | End: 2024-01-01 | Stop reason: HOSPADM

## 2024-01-01 RX ORDER — DEXMEDETOMIDINE HYDROCHLORIDE 4 UG/ML
.2-1.5 INJECTION, SOLUTION INTRAVENOUS
Status: DISCONTINUED | OUTPATIENT
Start: 2024-01-01 | End: 2024-01-01

## 2024-01-01 RX ORDER — LEVETIRACETAM 500 MG/1
500 TABLET ORAL EVERY 12 HOURS SCHEDULED
Status: DISCONTINUED | OUTPATIENT
Start: 2024-01-01 | End: 2024-01-01 | Stop reason: HOSPADM

## 2024-01-01 RX ORDER — MIDAZOLAM HYDROCHLORIDE 1 MG/ML
2 INJECTION INTRAMUSCULAR; INTRAVENOUS ONCE
Status: COMPLETED | OUTPATIENT
Start: 2024-01-01 | End: 2024-01-01

## 2024-01-01 RX ORDER — NITROGLYCERIN 0.4 MG/1
0.4 TABLET SUBLINGUAL
Status: DISCONTINUED | OUTPATIENT
Start: 2024-01-01 | End: 2024-01-01 | Stop reason: HOSPADM

## 2024-01-01 RX ORDER — BISACODYL 5 MG/1
5 TABLET, DELAYED RELEASE ORAL DAILY PRN
Status: DISCONTINUED | OUTPATIENT
Start: 2024-01-01 | End: 2024-01-01 | Stop reason: HOSPADM

## 2024-01-01 RX ORDER — AMOXICILLIN 250 MG
2 CAPSULE ORAL 2 TIMES DAILY
Status: DISCONTINUED | OUTPATIENT
Start: 2024-01-01 | End: 2024-01-01

## 2024-01-01 RX ORDER — IBUPROFEN 600 MG/1
1 TABLET ORAL
Status: DISCONTINUED | OUTPATIENT
Start: 2024-01-01 | End: 2024-01-01 | Stop reason: HOSPADM

## 2024-01-01 RX ORDER — CITALOPRAM 20 MG/1
20 TABLET ORAL NIGHTLY
Status: DISCONTINUED | OUTPATIENT
Start: 2024-01-01 | End: 2024-01-01 | Stop reason: HOSPADM

## 2024-01-01 RX ORDER — TEMAZEPAM 15 MG/1
15 CAPSULE ORAL NIGHTLY PRN
Status: DISCONTINUED | OUTPATIENT
Start: 2024-01-01 | End: 2024-01-01 | Stop reason: HOSPADM

## 2024-01-01 RX ORDER — POTASSIUM CHLORIDE 7.45 MG/ML
10 INJECTION INTRAVENOUS
Status: COMPLETED | OUTPATIENT
Start: 2024-01-01 | End: 2024-01-01

## 2024-01-01 RX ORDER — DEXAMETHASONE SODIUM PHOSPHATE 4 MG/ML
4 INJECTION, SOLUTION INTRA-ARTICULAR; INTRALESIONAL; INTRAMUSCULAR; INTRAVENOUS; SOFT TISSUE EVERY 6 HOURS
Status: DISCONTINUED | OUTPATIENT
Start: 2024-01-01 | End: 2024-01-01 | Stop reason: HOSPADM

## 2024-01-01 RX ORDER — HYDRALAZINE HYDROCHLORIDE 20 MG/ML
10 INJECTION INTRAMUSCULAR; INTRAVENOUS EVERY 6 HOURS PRN
Status: DISCONTINUED | OUTPATIENT
Start: 2024-01-01 | End: 2024-01-01 | Stop reason: HOSPADM

## 2024-01-01 RX ORDER — METOPROLOL TARTRATE 1 MG/ML
5 INJECTION, SOLUTION INTRAVENOUS EVERY 6 HOURS
Status: DISCONTINUED | OUTPATIENT
Start: 2024-01-01 | End: 2024-01-01

## 2024-01-01 RX ORDER — SODIUM CHLORIDE 9 MG/ML
40 INJECTION, SOLUTION INTRAVENOUS AS NEEDED
Status: DISCONTINUED | OUTPATIENT
Start: 2024-01-01 | End: 2024-01-01 | Stop reason: SDUPTHER

## 2024-01-01 RX ORDER — 3% SODIUM CHLORIDE 3 G/100ML
20 INJECTION, SOLUTION INTRAVENOUS CONTINUOUS
Status: DISCONTINUED | OUTPATIENT
Start: 2024-01-01 | End: 2024-01-01

## 2024-01-01 RX ORDER — PANTOPRAZOLE SODIUM 40 MG/10ML
40 INJECTION, POWDER, LYOPHILIZED, FOR SOLUTION INTRAVENOUS
Status: DISCONTINUED | OUTPATIENT
Start: 2024-01-01 | End: 2024-01-01

## 2024-01-01 RX ORDER — ALBUMIN, HUMAN INJ 5% 5 %
500 SOLUTION INTRAVENOUS ONCE
Status: COMPLETED | OUTPATIENT
Start: 2024-01-01 | End: 2024-01-01

## 2024-01-01 RX ORDER — NICOTINE POLACRILEX 4 MG
15 LOZENGE BUCCAL
Status: DISCONTINUED | OUTPATIENT
Start: 2024-01-01 | End: 2024-01-01 | Stop reason: HOSPADM

## 2024-01-01 RX ADMIN — LEVETIRACETAM 500 MG: 500 TABLET, FILM COATED ORAL at 09:48

## 2024-01-01 RX ADMIN — Medication 10 ML: at 21:23

## 2024-01-01 RX ADMIN — Medication 10 ML: at 08:29

## 2024-01-01 RX ADMIN — LEVETIRACETAM 500 MG: 100 INJECTION, SOLUTION INTRAVENOUS at 10:38

## 2024-01-01 RX ADMIN — DEXAMETHASONE SODIUM PHOSPHATE 4 MG: 4 INJECTION, SOLUTION INTRAMUSCULAR; INTRAVENOUS at 08:19

## 2024-01-01 RX ADMIN — INSULIN LISPRO 2 UNITS: 100 INJECTION, SOLUTION INTRAVENOUS; SUBCUTANEOUS at 05:43

## 2024-01-01 RX ADMIN — POTASSIUM CHLORIDE 10 MEQ: 7.46 INJECTION, SOLUTION INTRAVENOUS at 21:23

## 2024-01-01 RX ADMIN — INSULIN LISPRO 2 UNITS: 100 INJECTION, SOLUTION INTRAVENOUS; SUBCUTANEOUS at 18:47

## 2024-01-01 RX ADMIN — MORPHINE SULFATE 2 MG: 2 INJECTION, SOLUTION INTRAMUSCULAR; INTRAVENOUS at 03:51

## 2024-01-01 RX ADMIN — LEVETIRACETAM 500 MG: 100 INJECTION, SOLUTION INTRAVENOUS at 21:39

## 2024-01-01 RX ADMIN — SODIUM CHLORIDE 100 ML/HR: 4.5 INJECTION, SOLUTION INTRAVENOUS at 08:37

## 2024-01-01 RX ADMIN — HYDROMORPHONE HYDROCHLORIDE 0.25 MG: 1 INJECTION, SOLUTION INTRAMUSCULAR; INTRAVENOUS; SUBCUTANEOUS at 15:52

## 2024-01-01 RX ADMIN — Medication 12.5 MG: at 20:30

## 2024-01-01 RX ADMIN — DEXAMETHASONE SODIUM PHOSPHATE 4 MG: 4 INJECTION, SOLUTION INTRAMUSCULAR; INTRAVENOUS at 21:22

## 2024-01-01 RX ADMIN — SODIUM CHLORIDE 100 ML/HR: 4.5 INJECTION, SOLUTION INTRAVENOUS at 18:19

## 2024-01-01 RX ADMIN — HYDROMORPHONE HYDROCHLORIDE 0.25 MG: 1 INJECTION, SOLUTION INTRAMUSCULAR; INTRAVENOUS; SUBCUTANEOUS at 04:48

## 2024-01-01 RX ADMIN — Medication 10 ML: at 20:30

## 2024-01-01 RX ADMIN — MORPHINE SULFATE 4 MG: 4 INJECTION, SOLUTION INTRAMUSCULAR; INTRAVENOUS at 18:03

## 2024-01-01 RX ADMIN — DEXAMETHASONE SODIUM PHOSPHATE 4 MG: 4 INJECTION, SOLUTION INTRAMUSCULAR; INTRAVENOUS at 09:48

## 2024-01-01 RX ADMIN — DEXAMETHASONE SODIUM PHOSPHATE 4 MG: 4 INJECTION, SOLUTION INTRAMUSCULAR; INTRAVENOUS at 15:51

## 2024-01-01 RX ADMIN — ALBUMIN (HUMAN) 500 ML: 12.5 INJECTION, SOLUTION INTRAVENOUS at 10:23

## 2024-01-01 RX ADMIN — Medication 10 ML: at 10:38

## 2024-01-01 RX ADMIN — DEXAMETHASONE SODIUM PHOSPHATE 4 MG: 4 INJECTION, SOLUTION INTRAMUSCULAR; INTRAVENOUS at 03:17

## 2024-01-01 RX ADMIN — TEMAZEPAM 15 MG: 15 CAPSULE ORAL at 23:41

## 2024-01-01 RX ADMIN — SODIUM CHLORIDE 20 ML/HR: 3 INJECTION, SOLUTION INTRAVENOUS at 01:42

## 2024-01-01 RX ADMIN — CITALOPRAM 20 MG: 20 TABLET, FILM COATED ORAL at 21:22

## 2024-01-01 RX ADMIN — Medication 10 ML: at 08:03

## 2024-01-01 RX ADMIN — DEXAMETHASONE SODIUM PHOSPHATE 4 MG: 4 INJECTION, SOLUTION INTRAMUSCULAR; INTRAVENOUS at 14:22

## 2024-01-01 RX ADMIN — Medication 10 ML: at 21:18

## 2024-01-01 RX ADMIN — LEVETIRACETAM 1000 MG: 100 INJECTION INTRAVENOUS at 20:48

## 2024-01-01 RX ADMIN — POTASSIUM CHLORIDE 40 MEQ: 1.5 POWDER, FOR SOLUTION ORAL at 13:25

## 2024-01-01 RX ADMIN — DEXAMETHASONE SODIUM PHOSPHATE 4 MG: 4 INJECTION, SOLUTION INTRAMUSCULAR; INTRAVENOUS at 03:32

## 2024-01-01 RX ADMIN — DEXAMETHASONE SODIUM PHOSPHATE 4 MG: 4 INJECTION, SOLUTION INTRAMUSCULAR; INTRAVENOUS at 03:23

## 2024-01-01 RX ADMIN — Medication 10 ML: at 08:02

## 2024-01-01 RX ADMIN — GLYCOPYRROLATE 0.4 MG: 0.2 INJECTION INTRAMUSCULAR; INTRAVENOUS at 23:27

## 2024-01-01 RX ADMIN — PANTOPRAZOLE SODIUM 40 MG: 40 INJECTION, POWDER, FOR SOLUTION INTRAVENOUS at 09:31

## 2024-01-01 RX ADMIN — DEXAMETHASONE SODIUM PHOSPHATE 10 MG: 4 INJECTION, SOLUTION INTRAMUSCULAR; INTRAVENOUS at 20:48

## 2024-01-01 RX ADMIN — Medication 10 ML: at 08:21

## 2024-01-01 RX ADMIN — Medication 12.5 MG: at 08:38

## 2024-01-01 RX ADMIN — DEXAMETHASONE SODIUM PHOSPHATE 4 MG: 4 INJECTION, SOLUTION INTRAMUSCULAR; INTRAVENOUS at 10:38

## 2024-01-01 RX ADMIN — SODIUM CHLORIDE 50 ML/HR: 9 INJECTION, SOLUTION INTRAVENOUS at 23:49

## 2024-01-01 RX ADMIN — CITALOPRAM 20 MG: 20 TABLET, FILM COATED ORAL at 20:34

## 2024-01-01 RX ADMIN — TEMAZEPAM 15 MG: 15 CAPSULE ORAL at 21:06

## 2024-01-01 RX ADMIN — Medication 12.5 MG: at 20:34

## 2024-01-01 RX ADMIN — DEXAMETHASONE SODIUM PHOSPHATE 4 MG: 4 INJECTION, SOLUTION INTRAMUSCULAR; INTRAVENOUS at 21:11

## 2024-01-01 RX ADMIN — SODIUM PHOSPHATE, MONOBASIC, MONOHYDRATE AND SODIUM PHOSPHATE, DIBASIC, ANHYDROUS 15 MMOL: 142; 276 INJECTION, SOLUTION INTRAVENOUS at 06:15

## 2024-01-01 RX ADMIN — PANTOPRAZOLE SODIUM 40 MG: 40 INJECTION, POWDER, FOR SOLUTION INTRAVENOUS at 06:24

## 2024-01-01 RX ADMIN — Medication 10 ML: at 08:30

## 2024-01-01 RX ADMIN — SODIUM CHLORIDE 30 ML/HR: 3 INJECTION, SOLUTION INTRAVENOUS at 17:58

## 2024-01-01 RX ADMIN — DEXAMETHASONE SODIUM PHOSPHATE 4 MG: 4 INJECTION, SOLUTION INTRAMUSCULAR; INTRAVENOUS at 20:34

## 2024-01-01 RX ADMIN — Medication 10 ML: at 21:41

## 2024-01-01 RX ADMIN — LEVETIRACETAM 500 MG: 100 INJECTION, SOLUTION INTRAVENOUS at 21:18

## 2024-01-01 RX ADMIN — Medication 10 ML: at 09:49

## 2024-01-01 RX ADMIN — HYDROMORPHONE HYDROCHLORIDE 1 MG: 1 INJECTION, SOLUTION INTRAMUSCULAR; INTRAVENOUS; SUBCUTANEOUS at 03:18

## 2024-01-01 RX ADMIN — LEVETIRACETAM 500 MG: 100 INJECTION, SOLUTION INTRAVENOUS at 08:32

## 2024-01-01 RX ADMIN — MIDAZOLAM 1 MG: 1 INJECTION INTRAMUSCULAR; INTRAVENOUS at 10:59

## 2024-01-01 RX ADMIN — HYDROMORPHONE HYDROCHLORIDE 1 MG: 1 INJECTION, SOLUTION INTRAMUSCULAR; INTRAVENOUS; SUBCUTANEOUS at 23:21

## 2024-01-01 RX ADMIN — LEVETIRACETAM 500 MG: 100 INJECTION, SOLUTION INTRAVENOUS at 08:30

## 2024-01-01 RX ADMIN — CITALOPRAM 20 MG: 20 TABLET, FILM COATED ORAL at 21:18

## 2024-01-01 RX ADMIN — MIDAZOLAM HYDROCHLORIDE 2 MG: 2 INJECTION, SOLUTION INTRAMUSCULAR; INTRAVENOUS at 15:52

## 2024-01-01 RX ADMIN — Medication 10 ML: at 21:40

## 2024-01-01 RX ADMIN — INSULIN LISPRO 2 UNITS: 100 INJECTION, SOLUTION INTRAVENOUS; SUBCUTANEOUS at 00:03

## 2024-01-01 RX ADMIN — DEXAMETHASONE SODIUM PHOSPHATE 4 MG: 4 INJECTION, SOLUTION INTRAMUSCULAR; INTRAVENOUS at 16:30

## 2024-01-01 RX ADMIN — INSULIN LISPRO 2 UNITS: 100 INJECTION, SOLUTION INTRAVENOUS; SUBCUTANEOUS at 12:58

## 2024-01-01 RX ADMIN — DEXAMETHASONE SODIUM PHOSPHATE 4 MG: 4 INJECTION, SOLUTION INTRAMUSCULAR; INTRAVENOUS at 08:27

## 2024-01-01 RX ADMIN — DEXAMETHASONE SODIUM PHOSPHATE 4 MG: 4 INJECTION, SOLUTION INTRAMUSCULAR; INTRAVENOUS at 03:38

## 2024-01-01 RX ADMIN — INSULIN LISPRO 2 UNITS: 100 INJECTION, SOLUTION INTRAVENOUS; SUBCUTANEOUS at 14:13

## 2024-01-01 RX ADMIN — DEXAMETHASONE SODIUM PHOSPHATE 4 MG: 4 INJECTION, SOLUTION INTRAMUSCULAR; INTRAVENOUS at 02:42

## 2024-01-01 RX ADMIN — Medication 10 ML: at 08:11

## 2024-01-01 RX ADMIN — DEXAMETHASONE SODIUM PHOSPHATE 4 MG: 4 INJECTION, SOLUTION INTRAMUSCULAR; INTRAVENOUS at 16:23

## 2024-01-01 RX ADMIN — Medication 10 ML: at 10:21

## 2024-01-01 RX ADMIN — HYDROMORPHONE HYDROCHLORIDE 1 MG: 1 INJECTION, SOLUTION INTRAMUSCULAR; INTRAVENOUS; SUBCUTANEOUS at 19:22

## 2024-01-01 RX ADMIN — MIDAZOLAM 1 MG: 1 INJECTION INTRAMUSCULAR; INTRAVENOUS at 16:32

## 2024-01-01 RX ADMIN — DEXAMETHASONE SODIUM PHOSPHATE 4 MG: 4 INJECTION, SOLUTION INTRAMUSCULAR; INTRAVENOUS at 15:52

## 2024-01-01 RX ADMIN — LEVETIRACETAM 500 MG: 100 INJECTION, SOLUTION INTRAVENOUS at 08:37

## 2024-01-01 RX ADMIN — DEXAMETHASONE SODIUM PHOSPHATE 4 MG: 4 INJECTION, SOLUTION INTRAMUSCULAR; INTRAVENOUS at 21:18

## 2024-01-01 RX ADMIN — Medication 10 ML: at 20:20

## 2024-01-01 RX ADMIN — CITALOPRAM 20 MG: 20 TABLET, FILM COATED ORAL at 20:30

## 2024-01-01 RX ADMIN — DEXAMETHASONE SODIUM PHOSPHATE 4 MG: 4 INJECTION, SOLUTION INTRAMUSCULAR; INTRAVENOUS at 08:17

## 2024-01-01 RX ADMIN — LEVETIRACETAM 500 MG: 500 TABLET, FILM COATED ORAL at 21:03

## 2024-01-01 RX ADMIN — DEXAMETHASONE SODIUM PHOSPHATE 4 MG: 4 INJECTION, SOLUTION INTRAMUSCULAR; INTRAVENOUS at 15:06

## 2024-01-01 RX ADMIN — LEVETIRACETAM 500 MG: 500 TABLET, FILM COATED ORAL at 08:19

## 2024-01-01 RX ADMIN — SODIUM CHLORIDE 30 ML/HR: 3 INJECTION, SOLUTION INTRAVENOUS at 20:43

## 2024-01-01 RX ADMIN — DEXAMETHASONE SODIUM PHOSPHATE 4 MG: 4 INJECTION, SOLUTION INTRAMUSCULAR; INTRAVENOUS at 21:23

## 2024-01-01 RX ADMIN — DEXAMETHASONE SODIUM PHOSPHATE 4 MG: 4 INJECTION, SOLUTION INTRAMUSCULAR; INTRAVENOUS at 08:30

## 2024-01-01 RX ADMIN — DEXAMETHASONE SODIUM PHOSPHATE 4 MG: 4 INJECTION, SOLUTION INTRAMUSCULAR; INTRAVENOUS at 03:51

## 2024-01-01 RX ADMIN — DEXAMETHASONE SODIUM PHOSPHATE 4 MG: 4 INJECTION, SOLUTION INTRAMUSCULAR; INTRAVENOUS at 03:06

## 2024-01-01 RX ADMIN — DEXAMETHASONE SODIUM PHOSPHATE 4 MG: 4 INJECTION, SOLUTION INTRAMUSCULAR; INTRAVENOUS at 20:30

## 2024-01-01 RX ADMIN — DEXAMETHASONE SODIUM PHOSPHATE 4 MG: 4 INJECTION, SOLUTION INTRAMUSCULAR; INTRAVENOUS at 10:20

## 2024-01-01 RX ADMIN — MORPHINE SULFATE 4 MG: 4 INJECTION, SOLUTION INTRAMUSCULAR; INTRAVENOUS at 11:50

## 2024-01-01 RX ADMIN — Medication 12.5 MG: at 09:48

## 2024-01-01 RX ADMIN — POTASSIUM CHLORIDE 10 MEQ: 7.46 INJECTION, SOLUTION INTRAVENOUS at 18:38

## 2024-01-01 RX ADMIN — METOPROLOL TARTRATE 25 MG: 25 TABLET, FILM COATED ORAL at 21:03

## 2024-01-01 RX ADMIN — POTASSIUM CHLORIDE 40 MEQ: 1.5 POWDER, FOR SOLUTION ORAL at 17:16

## 2024-01-01 RX ADMIN — MIDAZOLAM 1 MG: 1 INJECTION INTRAMUSCULAR; INTRAVENOUS at 21:52

## 2024-01-01 RX ADMIN — SODIUM CHLORIDE 20 ML/HR: 3 INJECTION, SOLUTION INTRAVENOUS at 08:33

## 2024-01-01 RX ADMIN — SODIUM PHOSPHATE, MONOBASIC, MONOHYDRATE AND SODIUM PHOSPHATE, DIBASIC, ANHYDROUS 15 MMOL: 142; 276 INJECTION, SOLUTION INTRAVENOUS at 08:49

## 2024-01-01 RX ADMIN — GADOTERIDOL 14 ML: 279.3 INJECTION, SOLUTION INTRAVENOUS at 01:14

## 2024-01-01 RX ADMIN — MORPHINE SULFATE 4 MG: 4 INJECTION, SOLUTION INTRAMUSCULAR; INTRAVENOUS at 00:51

## 2024-01-01 RX ADMIN — SODIUM CHLORIDE 30 ML/HR: 3 INJECTION, SOLUTION INTRAVENOUS at 08:43

## 2024-01-01 RX ADMIN — LEVETIRACETAM 500 MG: 500 TABLET, FILM COATED ORAL at 10:20

## 2024-01-01 RX ADMIN — LEVETIRACETAM 500 MG: 100 INJECTION, SOLUTION INTRAVENOUS at 21:23

## 2024-01-01 RX ADMIN — PHENOL 1 SPRAY: 1.4 SPRAY ORAL at 18:15

## 2024-01-01 RX ADMIN — DEXAMETHASONE SODIUM PHOSPHATE 4 MG: 4 INJECTION, SOLUTION INTRAMUSCULAR; INTRAVENOUS at 21:40

## 2024-01-01 RX ADMIN — INSULIN LISPRO 2 UNITS: 100 INJECTION, SOLUTION INTRAVENOUS; SUBCUTANEOUS at 00:53

## 2024-01-01 RX ADMIN — Medication 10 ML: at 08:38

## 2024-01-01 RX ADMIN — MORPHINE SULFATE 4 MG: 4 INJECTION, SOLUTION INTRAMUSCULAR; INTRAVENOUS at 02:37

## 2024-01-01 RX ADMIN — DEXAMETHASONE SODIUM PHOSPHATE 4 MG: 4 INJECTION, SOLUTION INTRAMUSCULAR; INTRAVENOUS at 03:39

## 2024-01-01 RX ADMIN — GLYCOPYRROLATE 0.4 MG: 0.2 INJECTION INTRAMUSCULAR; INTRAVENOUS at 10:59

## 2024-01-01 RX ADMIN — Medication 12.5 MG: at 14:22

## 2024-01-01 RX ADMIN — SODIUM CHLORIDE 50 ML/HR: 9 INJECTION, SOLUTION INTRAVENOUS at 04:31

## 2024-01-01 RX ADMIN — LEVETIRACETAM 500 MG: 100 INJECTION, SOLUTION INTRAVENOUS at 20:30

## 2024-01-01 RX ADMIN — SODIUM CHLORIDE 20 ML/HR: 3 INJECTION, SOLUTION INTRAVENOUS at 12:20

## 2024-01-01 RX ADMIN — DEXAMETHASONE SODIUM PHOSPHATE 4 MG: 4 INJECTION, SOLUTION INTRAMUSCULAR; INTRAVENOUS at 15:57

## 2024-01-01 RX ADMIN — INSULIN LISPRO 4 UNITS: 100 INJECTION, SOLUTION INTRAVENOUS; SUBCUTANEOUS at 17:57

## 2024-01-01 RX ADMIN — DEXAMETHASONE SODIUM PHOSPHATE 4 MG: 4 INJECTION, SOLUTION INTRAMUSCULAR; INTRAVENOUS at 17:47

## 2024-01-01 RX ADMIN — LEVETIRACETAM 500 MG: 500 TABLET, FILM COATED ORAL at 21:23

## 2024-01-01 RX ADMIN — LEVETIRACETAM 500 MG: 100 INJECTION, SOLUTION INTRAVENOUS at 08:33

## 2024-01-01 RX ADMIN — Medication 12.5 MG: at 08:32

## 2024-01-01 RX ADMIN — Medication 10 ML: at 21:04

## 2024-01-01 RX ADMIN — IOPAMIDOL 100 ML: 755 INJECTION, SOLUTION INTRAVENOUS at 11:18

## 2024-01-01 RX ADMIN — SCOPALAMINE 1 PATCH: 1 PATCH, EXTENDED RELEASE TRANSDERMAL at 10:52

## 2024-01-01 RX ADMIN — SODIUM CHLORIDE 20 ML/HR: 3 INJECTION, SOLUTION INTRAVENOUS at 18:04

## 2024-01-01 RX ADMIN — Medication 10 ML: at 00:51

## 2024-01-01 RX ADMIN — INSULIN LISPRO 2 UNITS: 100 INJECTION, SOLUTION INTRAVENOUS; SUBCUTANEOUS at 13:18

## 2024-01-01 RX ADMIN — HYDROMORPHONE HYDROCHLORIDE 0.25 MG: 1 INJECTION, SOLUTION INTRAMUSCULAR; INTRAVENOUS; SUBCUTANEOUS at 15:51

## 2024-01-01 RX ADMIN — LEVETIRACETAM 500 MG: 500 TABLET, FILM COATED ORAL at 20:34

## 2024-01-01 RX ADMIN — MORPHINE SULFATE 4 MG: 4 INJECTION, SOLUTION INTRAMUSCULAR; INTRAVENOUS at 09:34

## 2024-01-01 RX ADMIN — MIDAZOLAM 1 MG: 1 INJECTION INTRAMUSCULAR; INTRAVENOUS at 15:52

## 2024-01-01 RX ADMIN — TEMAZEPAM 15 MG: 15 CAPSULE ORAL at 22:44

## 2024-01-01 RX ADMIN — DEXMEDETOMIDINE HYDROCHLORIDE 0.2 MCG/KG/HR: 4 INJECTION, SOLUTION INTRAVENOUS at 08:17

## 2024-01-01 RX ADMIN — CITALOPRAM 20 MG: 20 TABLET, FILM COATED ORAL at 21:03

## 2024-01-01 RX ADMIN — DEXAMETHASONE SODIUM PHOSPHATE 4 MG: 4 INJECTION, SOLUTION INTRAMUSCULAR; INTRAVENOUS at 08:38

## 2024-01-01 RX ADMIN — ONDANSETRON 4 MG: 2 INJECTION INTRAMUSCULAR; INTRAVENOUS at 19:22

## 2024-01-01 RX ADMIN — MORPHINE SULFATE 4 MG: 4 INJECTION, SOLUTION INTRAMUSCULAR; INTRAVENOUS at 21:51

## 2024-01-01 RX ADMIN — TEMAZEPAM 15 MG: 15 CAPSULE ORAL at 22:27

## 2024-01-16 ENCOUNTER — OFFICE VISIT (OUTPATIENT)
Dept: CARDIOLOGY | Facility: CLINIC | Age: 69
End: 2024-01-16
Payer: MEDICARE

## 2024-01-16 VITALS
OXYGEN SATURATION: 100 % | HEIGHT: 70 IN | BODY MASS INDEX: 22.26 KG/M2 | HEART RATE: 71 BPM | SYSTOLIC BLOOD PRESSURE: 134 MMHG | WEIGHT: 155.5 LBS | DIASTOLIC BLOOD PRESSURE: 79 MMHG

## 2024-01-16 DIAGNOSIS — J44.9 CHRONIC OBSTRUCTIVE PULMONARY DISEASE, UNSPECIFIED COPD TYPE: ICD-10-CM

## 2024-01-16 DIAGNOSIS — I10 PRIMARY HYPERTENSION: ICD-10-CM

## 2024-01-16 DIAGNOSIS — I25.118 CORONARY ARTERY DISEASE OF NATIVE ARTERY OF NATIVE HEART WITH STABLE ANGINA PECTORIS: Primary | ICD-10-CM

## 2024-01-16 DIAGNOSIS — E78.00 PURE HYPERCHOLESTEROLEMIA: ICD-10-CM

## 2024-01-16 PROCEDURE — 1159F MED LIST DOCD IN RCRD: CPT | Performed by: INTERNAL MEDICINE

## 2024-01-16 PROCEDURE — 1160F RVW MEDS BY RX/DR IN RCRD: CPT | Performed by: INTERNAL MEDICINE

## 2024-01-16 NOTE — PROGRESS NOTES
"    Subjective:     Encounter Date:01/16/2024      Patient ID: Jersey Celaya is a 68 y.o. male.    Chief Complaint:  History of Present Illness 68-year-old white male with history of coronary disease history of hyperlipidemia and hypertension COPD presents to my office for follow-up.  Patient is currently having symptoms of shortness of breath and dizziness.  No complaint of any chest pain.  No PND orthopnea.  No palpitations syncope.  He has some mild swelling of the feet but is taking his meds regular.  He still continues to smoke.    The following portions of the patient's history were reviewed and updated as appropriate: allergies, current medications, past family history, past medical history, past social history, past surgical history, and problem list.  Past Medical History:   Diagnosis Date    Depression     Depression     Low back pain      Past Surgical History:   Procedure Laterality Date    CARDIAC CATHETERIZATION N/A 3/22/2021    Procedure: Left Heart Cath with angiogram;  Surgeon: Mack French MD;  Location: [facility] CATH INVASIVE LOCATION;  Service: Cardiovascular;  Laterality: N/A;    CARDIAC CATHETERIZATION N/A 3/22/2021    Procedure: Left ventriculography;  Surgeon: Mack French MD;  Location:  [facility] CATH INVASIVE LOCATION;  Service: Cardiovascular;  Laterality: N/A;    CERVICAL SPINE SURGERY      INNER EAR SURGERY      WISDOM TOOTH EXTRACTION       /79   Pulse 71   Ht 177.8 cm (70\")   Wt 70.5 kg (155 lb 8 oz)   SpO2 100%   BMI 22.31 kg/m²   Family History   Problem Relation Age of Onset    Cancer Mother     Hypertension Mother     Cancer Father        Current Outpatient Medications:     fluticasone (FLONASE) 50 MCG/ACT nasal spray, 1 spray into the nostril(s) as directed by provider Daily., Disp: , Rfl:     metoprolol succinate XL (TOPROL-XL) 25 MG 24 hr tablet, Take 1 tablet by mouth Daily., Disp: 90 tablet, Rfl: 3    mirtazapine (REMERON) 15 MG tablet, Take 1 tablet by mouth " Every Night., Disp: , Rfl:     tamsulosin (FLOMAX) 0.4 MG capsule 24 hr capsule, Take 1 capsule by mouth Every Night., Disp: , Rfl:     tiZANidine (ZANAFLEX) 4 MG tablet, Take 1 tablet by mouth Every Night., Disp: , Rfl:     aspirin 81 MG EC tablet, Take 1 tablet by mouth Daily., Disp: , Rfl:     atorvastatin (LIPITOR) 20 MG tablet, Take 1 tablet by mouth Every Night., Disp: , Rfl:     citalopram (CeleXA) 20 MG tablet, Take 1 tablet by mouth Every Night., Disp: , Rfl:     meloxicam (MOBIC) 15 MG tablet, Take 1 tablet by mouth Daily As Needed., Disp: , Rfl:     oxyCODONE-acetaminophen (PERCOCET)  MG per tablet, Take 1 tablet by mouth Every 8 (Eight) Hours As Needed for Moderate Pain., Disp: , Rfl:   Allergies   Allergen Reactions    Pregabalin Swelling    Gluten Meal GI Intolerance     Social History     Socioeconomic History    Marital status: Single   Tobacco Use    Smoking status: Every Day     Packs/day: .5     Types: Cigarettes    Smokeless tobacco: Never   Vaping Use    Vaping Use: Never used   Substance and Sexual Activity    Alcohol use: Never    Drug use: Never    Sexual activity: Defer     Review of Systems   Constitutional: Negative for malaise/fatigue.   Cardiovascular:  Positive for leg swelling. Negative for chest pain, dyspnea on exertion and palpitations.   Respiratory:  Positive for shortness of breath. Negative for cough.    Gastrointestinal:  Negative for abdominal pain, nausea and vomiting.   Neurological:  Positive for dizziness and light-headedness. Negative for focal weakness, headaches and numbness.   All other systems reviewed and are negative.             Objective:     Constitutional:       Appearance: Well-developed.   Eyes:      General: No scleral icterus.     Conjunctiva/sclera: Conjunctivae normal.   HENT:      Head: Normocephalic and atraumatic.   Neck:      Vascular: No carotid bruit or JVD.   Pulmonary:      Effort: Pulmonary effort is normal.      Breath sounds: Normal breath  sounds. No wheezing. No rales.   Cardiovascular:      Normal rate. Regular rhythm.   Pulses:     Intact distal pulses.   Abdominal:      General: Bowel sounds are normal.      Palpations: Abdomen is soft.   Musculoskeletal:      Cervical back: Normal range of motion and neck supple. Skin:     General: Skin is warm and dry.      Findings: No rash.   Neurological:      Mental Status: Alert.       Procedures    Lab Review:         MDM    #1 coronary disease  Patient has not had any chest pain but has shortness of breath and dizziness and check an echocardiogram for LV function valvular abnormalities  2.  Hypertension still.  Blood pressure currently stable on metoprolol  3.  Hyperlipidemia  Patient is on atorvastatin the lipid levels are well within normal meds  4.  COPD  Patient still continues to smoke and is advised to stop smoking    Patient's previous medical records, labs, and EKG were reviewed and discussed with the patient at today's visit.

## 2024-01-17 ENCOUNTER — APPOINTMENT (OUTPATIENT)
Dept: CARDIOLOGY | Facility: HOSPITAL | Age: 69
End: 2024-01-17
Payer: MEDICARE

## 2024-01-17 ENCOUNTER — APPOINTMENT (OUTPATIENT)
Dept: GENERAL RADIOLOGY | Facility: HOSPITAL | Age: 69
End: 2024-01-17
Payer: MEDICARE

## 2024-01-17 ENCOUNTER — HOSPITAL ENCOUNTER (OUTPATIENT)
Facility: HOSPITAL | Age: 69
Setting detail: OBSERVATION
Discharge: HOME | End: 2024-01-18
Attending: EMERGENCY MEDICINE | Admitting: EMERGENCY MEDICINE
Payer: MEDICARE

## 2024-01-17 ENCOUNTER — APPOINTMENT (OUTPATIENT)
Dept: CT IMAGING | Facility: HOSPITAL | Age: 69
End: 2024-01-17
Payer: MEDICARE

## 2024-01-17 DIAGNOSIS — S12.041A CLOSED NONDISPLACED LATERAL MASS FRACTURE OF FIRST CERVICAL VERTEBRA, INITIAL ENCOUNTER: ICD-10-CM

## 2024-01-17 DIAGNOSIS — R29.6 FREQUENT FALLS: ICD-10-CM

## 2024-01-17 DIAGNOSIS — S22.41XA CLOSED FRACTURE OF MULTIPLE RIBS OF RIGHT SIDE, INITIAL ENCOUNTER: Primary | ICD-10-CM

## 2024-01-17 LAB
ALBUMIN SERPL-MCNC: 3.9 G/DL (ref 3.5–5.2)
ALBUMIN/GLOB SERPL: 1.6 G/DL
ALP SERPL-CCNC: 200 U/L (ref 39–117)
ALT SERPL W P-5'-P-CCNC: 36 U/L (ref 1–41)
ANION GAP SERPL CALCULATED.3IONS-SCNC: 9 MMOL/L (ref 5–15)
AST SERPL-CCNC: 34 U/L (ref 1–40)
BASOPHILS # BLD AUTO: 0 10*3/MM3 (ref 0–0.2)
BASOPHILS NFR BLD AUTO: 0.3 % (ref 0–1.5)
BH CV ECHO MEAS - ACS: 2 CM
BH CV ECHO MEAS - AO MAX PG: 4.8 MMHG
BH CV ECHO MEAS - AO MEAN PG: 3 MMHG
BH CV ECHO MEAS - AO V2 MAX: 110 CM/SEC
BH CV ECHO MEAS - AO V2 VTI: 23.4 CM
BH CV ECHO MEAS - AVA(I,D): 2.08 CM2
BH CV ECHO MEAS - EDV(CUBED): 97.3 ML
BH CV ECHO MEAS - EDV(MOD-SP4): 100 ML
BH CV ECHO MEAS - EF(MOD-SP4): 44.5 %
BH CV ECHO MEAS - ESV(CUBED): 39.3 ML
BH CV ECHO MEAS - ESV(MOD-SP4): 55.5 ML
BH CV ECHO MEAS - FS: 26.1 %
BH CV ECHO MEAS - IVS/LVPW: 1 CM
BH CV ECHO MEAS - IVSD: 1 CM
BH CV ECHO MEAS - LA DIMENSION: 3.5 CM
BH CV ECHO MEAS - LAT PEAK E' VEL: 8.5 CM/SEC
BH CV ECHO MEAS - LV DIASTOLIC VOL/BSA (35-75): 53.3 CM2
BH CV ECHO MEAS - LV MASS(C)D: 158.8 GRAMS
BH CV ECHO MEAS - LV MAX PG: 1.8 MMHG
BH CV ECHO MEAS - LV MEAN PG: 1 MMHG
BH CV ECHO MEAS - LV SYSTOLIC VOL/BSA (12-30): 29.6 CM2
BH CV ECHO MEAS - LV V1 MAX: 67.1 CM/SEC
BH CV ECHO MEAS - LV V1 VTI: 15.5 CM
BH CV ECHO MEAS - LVIDD: 4.6 CM
BH CV ECHO MEAS - LVIDS: 3.4 CM
BH CV ECHO MEAS - LVOT AREA: 3.1 CM2
BH CV ECHO MEAS - LVOT DIAM: 2 CM
BH CV ECHO MEAS - LVPWD: 1 CM
BH CV ECHO MEAS - MED PEAK E' VEL: 6.7 CM/SEC
BH CV ECHO MEAS - MV A MAX VEL: 84.9 CM/SEC
BH CV ECHO MEAS - MV DEC SLOPE: 366 CM/SEC2
BH CV ECHO MEAS - MV DEC TIME: 0.17 SEC
BH CV ECHO MEAS - MV E MAX VEL: 50.3 CM/SEC
BH CV ECHO MEAS - MV E/A: 0.59
BH CV ECHO MEAS - MV MAX PG: 4.2 MMHG
BH CV ECHO MEAS - MV MEAN PG: 2 MMHG
BH CV ECHO MEAS - MV P1/2T: 67.3 MSEC
BH CV ECHO MEAS - MV V2 VTI: 21.4 CM
BH CV ECHO MEAS - MVA(P1/2T): 3.3 CM2
BH CV ECHO MEAS - MVA(VTI): 2.28 CM2
BH CV ECHO MEAS - PA ACC TIME: 0.14 SEC
BH CV ECHO MEAS - PA V2 MAX: 88.4 CM/SEC
BH CV ECHO MEAS - RV MAX PG: 1.21 MMHG
BH CV ECHO MEAS - RV V1 MAX: 55.1 CM/SEC
BH CV ECHO MEAS - RV V1 VTI: 11.9 CM
BH CV ECHO MEAS - SI(MOD-SP4): 23.7 ML/M2
BH CV ECHO MEAS - SV(LVOT): 48.7 ML
BH CV ECHO MEAS - SV(MOD-SP4): 44.5 ML
BH CV ECHO MEAS - TAPSE (>1.6): 1.74 CM
BH CV ECHO MEAS - TR MAX PG: 23.4 MMHG
BH CV ECHO MEAS - TR MAX VEL: 242 CM/SEC
BH CV ECHO MEASUREMENTS AVERAGE E/E' RATIO: 6.62
BH CV XLRA - TDI S': 10.9 CM/SEC
BILIRUB SERPL-MCNC: 0.4 MG/DL (ref 0–1.2)
BILIRUB UR QL STRIP: NEGATIVE
BUN SERPL-MCNC: 10 MG/DL (ref 8–23)
BUN/CREAT SERPL: 13.5 (ref 7–25)
CALCIUM SPEC-SCNC: 8.2 MG/DL (ref 8.6–10.5)
CHLORIDE SERPL-SCNC: 100 MMOL/L (ref 98–107)
CHOLEST SERPL-MCNC: 76 MG/DL (ref 0–200)
CLARITY UR: CLEAR
CO2 SERPL-SCNC: 26 MMOL/L (ref 22–29)
COLOR UR: YELLOW
CREAT SERPL-MCNC: 0.74 MG/DL (ref 0.76–1.27)
DEPRECATED RDW RBC AUTO: 88.4 FL (ref 37–54)
EGFRCR SERPLBLD CKD-EPI 2021: 99.3 ML/MIN/1.73
EOSINOPHIL # BLD AUTO: 0.1 10*3/MM3 (ref 0–0.4)
EOSINOPHIL NFR BLD AUTO: 1.9 % (ref 0.3–6.2)
ERYTHROCYTE [DISTWIDTH] IN BLOOD BY AUTOMATED COUNT: 24.9 % (ref 12.3–15.4)
FERRITIN SERPL-MCNC: 18.83 NG/ML (ref 30–400)
GLOBULIN UR ELPH-MCNC: 2.5 GM/DL
GLUCOSE SERPL-MCNC: 84 MG/DL (ref 65–99)
GLUCOSE UR STRIP-MCNC: NEGATIVE MG/DL
HBA1C MFR BLD: 5.5 % (ref 4.8–5.6)
HCT VFR BLD AUTO: 30.7 % (ref 37.5–51)
HDLC SERPL-MCNC: 32 MG/DL (ref 40–60)
HGB BLD-MCNC: 9.8 G/DL (ref 13–17.7)
HGB UR QL STRIP.AUTO: NEGATIVE
IRON 24H UR-MRATE: 33 MCG/DL (ref 59–158)
IRON SATN MFR SERPL: 7 % (ref 20–50)
KETONES UR QL STRIP: NEGATIVE
LDLC SERPL CALC-MCNC: 31 MG/DL (ref 0–100)
LDLC/HDLC SERPL: 1.04 {RATIO}
LEUKOCYTE ESTERASE UR QL STRIP.AUTO: NEGATIVE
LYMPHOCYTES # BLD AUTO: 0.7 10*3/MM3 (ref 0.7–3.1)
LYMPHOCYTES NFR BLD AUTO: 15.9 % (ref 19.6–45.3)
MCH RBC QN AUTO: 31.2 PG (ref 26.6–33)
MCHC RBC AUTO-ENTMCNC: 31.9 G/DL (ref 31.5–35.7)
MCV RBC AUTO: 97.8 FL (ref 79–97)
MONOCYTES # BLD AUTO: 0.6 10*3/MM3 (ref 0.1–0.9)
MONOCYTES NFR BLD AUTO: 14.2 % (ref 5–12)
NEUTROPHILS NFR BLD AUTO: 2.8 10*3/MM3 (ref 1.7–7)
NEUTROPHILS NFR BLD AUTO: 67.7 % (ref 42.7–76)
NITRITE UR QL STRIP: NEGATIVE
NRBC BLD AUTO-RTO: 0.2 /100 WBC (ref 0–0.2)
PH UR STRIP.AUTO: 7 [PH] (ref 5–8)
PLATELET # BLD AUTO: 140 10*3/MM3 (ref 140–450)
PMV BLD AUTO: 10.8 FL (ref 6–12)
POTASSIUM SERPL-SCNC: 4 MMOL/L (ref 3.5–5.2)
PROT SERPL-MCNC: 6.4 G/DL (ref 6–8.5)
PROT UR QL STRIP: NEGATIVE
RBC # BLD AUTO: 3.14 10*6/MM3 (ref 4.14–5.8)
SINUS: 3.5 CM
SODIUM SERPL-SCNC: 135 MMOL/L (ref 136–145)
SP GR UR STRIP: 1.01 (ref 1–1.03)
STJ: 3.2 CM
TIBC SERPL-MCNC: 454 MCG/DL (ref 298–536)
TRANSFERRIN SERPL-MCNC: 305 MG/DL (ref 200–360)
TRIGL SERPL-MCNC: 53 MG/DL (ref 0–150)
TSH SERPL DL<=0.05 MIU/L-ACNC: 2.37 UIU/ML (ref 0.27–4.2)
UROBILINOGEN UR QL STRIP: NORMAL
VLDLC SERPL-MCNC: 13 MG/DL (ref 5–40)
WBC NRBC COR # BLD AUTO: 4.1 10*3/MM3 (ref 3.4–10.8)

## 2024-01-17 PROCEDURE — 71045 X-RAY EXAM CHEST 1 VIEW: CPT

## 2024-01-17 PROCEDURE — 84443 ASSAY THYROID STIM HORMONE: CPT | Performed by: PHYSICIAN ASSISTANT

## 2024-01-17 PROCEDURE — 81003 URINALYSIS AUTO W/O SCOPE: CPT

## 2024-01-17 PROCEDURE — 80053 COMPREHEN METABOLIC PANEL: CPT

## 2024-01-17 PROCEDURE — 83036 HEMOGLOBIN GLYCOSYLATED A1C: CPT | Performed by: PHYSICIAN ASSISTANT

## 2024-01-17 PROCEDURE — 70450 CT HEAD/BRAIN W/O DYE: CPT

## 2024-01-17 PROCEDURE — 93306 TTE W/DOPPLER COMPLETE: CPT | Performed by: INTERNAL MEDICINE

## 2024-01-17 PROCEDURE — G0378 HOSPITAL OBSERVATION PER HR: HCPCS

## 2024-01-17 PROCEDURE — 80307 DRUG TEST PRSMV CHEM ANLYZR: CPT | Performed by: PHYSICIAN ASSISTANT

## 2024-01-17 PROCEDURE — 97165 OT EVAL LOW COMPLEX 30 MIN: CPT

## 2024-01-17 PROCEDURE — 93005 ELECTROCARDIOGRAM TRACING: CPT | Performed by: EMERGENCY MEDICINE

## 2024-01-17 PROCEDURE — 82728 ASSAY OF FERRITIN: CPT | Performed by: PHYSICIAN ASSISTANT

## 2024-01-17 PROCEDURE — 99203 OFFICE O/P NEW LOW 30 MIN: CPT | Performed by: NURSE PRACTITIONER

## 2024-01-17 PROCEDURE — 93306 TTE W/DOPPLER COMPLETE: CPT

## 2024-01-17 PROCEDURE — 84466 ASSAY OF TRANSFERRIN: CPT | Performed by: PHYSICIAN ASSISTANT

## 2024-01-17 PROCEDURE — 72125 CT NECK SPINE W/O DYE: CPT

## 2024-01-17 PROCEDURE — 80061 LIPID PANEL: CPT | Performed by: PHYSICIAN ASSISTANT

## 2024-01-17 PROCEDURE — 85025 COMPLETE CBC W/AUTO DIFF WBC: CPT

## 2024-01-17 PROCEDURE — 93005 ELECTROCARDIOGRAM TRACING: CPT

## 2024-01-17 PROCEDURE — 99284 EMERGENCY DEPT VISIT MOD MDM: CPT

## 2024-01-17 PROCEDURE — 83540 ASSAY OF IRON: CPT | Performed by: PHYSICIAN ASSISTANT

## 2024-01-17 RX ORDER — ASPIRIN 81 MG/1
81 TABLET ORAL DAILY
Status: DISCONTINUED | OUTPATIENT
Start: 2024-01-17 | End: 2024-01-18 | Stop reason: HOSPADM

## 2024-01-17 RX ORDER — ATORVASTATIN CALCIUM 10 MG/1
10 TABLET, FILM COATED ORAL NIGHTLY
Status: DISCONTINUED | OUTPATIENT
Start: 2024-01-17 | End: 2024-01-18 | Stop reason: HOSPADM

## 2024-01-17 RX ORDER — CITALOPRAM 20 MG/1
40 TABLET ORAL NIGHTLY
Status: DISCONTINUED | OUTPATIENT
Start: 2024-01-17 | End: 2024-01-18 | Stop reason: HOSPADM

## 2024-01-17 RX ORDER — ALUMINA, MAGNESIA, AND SIMETHICONE 2400; 2400; 240 MG/30ML; MG/30ML; MG/30ML
15 SUSPENSION ORAL EVERY 6 HOURS PRN
Status: DISCONTINUED | OUTPATIENT
Start: 2024-01-17 | End: 2024-01-18 | Stop reason: HOSPADM

## 2024-01-17 RX ORDER — TEMAZEPAM 15 MG/1
15 CAPSULE ORAL NIGHTLY PRN
COMMUNITY

## 2024-01-17 RX ORDER — BISACODYL 10 MG
10 SUPPOSITORY, RECTAL RECTAL DAILY PRN
Status: DISCONTINUED | OUTPATIENT
Start: 2024-01-17 | End: 2024-01-18 | Stop reason: HOSPADM

## 2024-01-17 RX ORDER — DIPHENOXYLATE HYDROCHLORIDE AND ATROPINE SULFATE 2.5; .025 MG/1; MG/1
1 TABLET ORAL 2 TIMES DAILY PRN
COMMUNITY

## 2024-01-17 RX ORDER — ACETAMINOPHEN 325 MG/1
650 TABLET ORAL EVERY 4 HOURS PRN
Status: DISCONTINUED | OUTPATIENT
Start: 2024-01-17 | End: 2024-01-18 | Stop reason: HOSPADM

## 2024-01-17 RX ORDER — ACETAMINOPHEN 650 MG/1
650 SUPPOSITORY RECTAL EVERY 4 HOURS PRN
Status: DISCONTINUED | OUTPATIENT
Start: 2024-01-17 | End: 2024-01-18 | Stop reason: HOSPADM

## 2024-01-17 RX ORDER — SODIUM CHLORIDE 0.9 % (FLUSH) 0.9 %
10 SYRINGE (ML) INJECTION EVERY 12 HOURS SCHEDULED
Status: DISCONTINUED | OUTPATIENT
Start: 2024-01-17 | End: 2024-01-18 | Stop reason: HOSPADM

## 2024-01-17 RX ORDER — NITROGLYCERIN 0.4 MG/1
0.4 TABLET SUBLINGUAL
Status: DISCONTINUED | OUTPATIENT
Start: 2024-01-17 | End: 2024-01-18 | Stop reason: HOSPADM

## 2024-01-17 RX ORDER — POLYETHYLENE GLYCOL 3350 17 G/17G
17 POWDER, FOR SOLUTION ORAL DAILY PRN
Status: DISCONTINUED | OUTPATIENT
Start: 2024-01-17 | End: 2024-01-18 | Stop reason: HOSPADM

## 2024-01-17 RX ORDER — MIRTAZAPINE 15 MG/1
15 TABLET, FILM COATED ORAL NIGHTLY
Status: DISCONTINUED | OUTPATIENT
Start: 2024-01-17 | End: 2024-01-18 | Stop reason: HOSPADM

## 2024-01-17 RX ORDER — SODIUM CHLORIDE 0.9 % (FLUSH) 0.9 %
10 SYRINGE (ML) INJECTION AS NEEDED
Status: DISCONTINUED | OUTPATIENT
Start: 2024-01-17 | End: 2024-01-18 | Stop reason: HOSPADM

## 2024-01-17 RX ORDER — ALBUTEROL SULFATE 90 UG/1
2 AEROSOL, METERED RESPIRATORY (INHALATION) EVERY 4 HOURS PRN
COMMUNITY

## 2024-01-17 RX ORDER — TEMAZEPAM 15 MG/1
15 CAPSULE ORAL NIGHTLY PRN
Status: DISCONTINUED | OUTPATIENT
Start: 2024-01-17 | End: 2024-01-18 | Stop reason: HOSPADM

## 2024-01-17 RX ORDER — METOPROLOL SUCCINATE 25 MG/1
25 TABLET, EXTENDED RELEASE ORAL DAILY
Status: DISCONTINUED | OUTPATIENT
Start: 2024-01-17 | End: 2024-01-18 | Stop reason: HOSPADM

## 2024-01-17 RX ORDER — SODIUM CHLORIDE 9 MG/ML
40 INJECTION, SOLUTION INTRAVENOUS AS NEEDED
Status: DISCONTINUED | OUTPATIENT
Start: 2024-01-17 | End: 2024-01-18 | Stop reason: HOSPADM

## 2024-01-17 RX ORDER — ALBUTEROL SULFATE 2.5 MG/3ML
2.5 SOLUTION RESPIRATORY (INHALATION) EVERY 6 HOURS PRN
Status: DISCONTINUED | OUTPATIENT
Start: 2024-01-17 | End: 2024-01-18 | Stop reason: HOSPADM

## 2024-01-17 RX ORDER — TAMSULOSIN HYDROCHLORIDE 0.4 MG/1
0.4 CAPSULE ORAL NIGHTLY
Status: DISCONTINUED | OUTPATIENT
Start: 2024-01-17 | End: 2024-01-18 | Stop reason: HOSPADM

## 2024-01-17 RX ORDER — ACETAMINOPHEN 160 MG/5ML
650 SOLUTION ORAL EVERY 4 HOURS PRN
Status: DISCONTINUED | OUTPATIENT
Start: 2024-01-17 | End: 2024-01-18 | Stop reason: HOSPADM

## 2024-01-17 RX ORDER — KETOROLAC TROMETHAMINE 30 MG/ML
15 INJECTION, SOLUTION INTRAMUSCULAR; INTRAVENOUS EVERY 6 HOURS PRN
Status: DISCONTINUED | OUTPATIENT
Start: 2024-01-17 | End: 2024-01-18 | Stop reason: HOSPADM

## 2024-01-17 RX ORDER — GABAPENTIN 300 MG/1
300 CAPSULE ORAL 3 TIMES DAILY
Status: DISCONTINUED | OUTPATIENT
Start: 2024-01-17 | End: 2024-01-18 | Stop reason: HOSPADM

## 2024-01-17 RX ORDER — MELOXICAM 15 MG/1
15 TABLET ORAL DAILY PRN
Status: DISCONTINUED | OUTPATIENT
Start: 2024-01-17 | End: 2024-01-18 | Stop reason: HOSPADM

## 2024-01-17 RX ORDER — BISACODYL 5 MG/1
5 TABLET, DELAYED RELEASE ORAL DAILY PRN
Status: DISCONTINUED | OUTPATIENT
Start: 2024-01-17 | End: 2024-01-18 | Stop reason: HOSPADM

## 2024-01-17 RX ORDER — GABAPENTIN 300 MG/1
300 CAPSULE ORAL 3 TIMES DAILY
COMMUNITY

## 2024-01-17 RX ORDER — ONDANSETRON 4 MG/1
4 TABLET, ORALLY DISINTEGRATING ORAL EVERY 6 HOURS PRN
Status: DISCONTINUED | OUTPATIENT
Start: 2024-01-17 | End: 2024-01-18 | Stop reason: HOSPADM

## 2024-01-17 RX ORDER — CHOLECALCIFEROL (VITAMIN D3) 125 MCG
5 CAPSULE ORAL NIGHTLY PRN
Status: DISCONTINUED | OUTPATIENT
Start: 2024-01-17 | End: 2024-01-18 | Stop reason: HOSPADM

## 2024-01-17 RX ORDER — AMOXICILLIN 250 MG
2 CAPSULE ORAL 2 TIMES DAILY
Status: DISCONTINUED | OUTPATIENT
Start: 2024-01-17 | End: 2024-01-18 | Stop reason: HOSPADM

## 2024-01-17 RX ORDER — ONDANSETRON 2 MG/ML
4 INJECTION INTRAMUSCULAR; INTRAVENOUS EVERY 6 HOURS PRN
Status: DISCONTINUED | OUTPATIENT
Start: 2024-01-17 | End: 2024-01-18 | Stop reason: HOSPADM

## 2024-01-17 RX ADMIN — GABAPENTIN 300 MG: 300 CAPSULE ORAL at 22:03

## 2024-01-17 RX ADMIN — MIRTAZAPINE 15 MG: 15 TABLET, FILM COATED ORAL at 22:03

## 2024-01-17 RX ADMIN — ATORVASTATIN CALCIUM 10 MG: 10 TABLET, FILM COATED ORAL at 22:03

## 2024-01-17 RX ADMIN — METOPROLOL SUCCINATE 25 MG: 25 TABLET, EXTENDED RELEASE ORAL at 16:30

## 2024-01-17 RX ADMIN — Medication 10 ML: at 16:31

## 2024-01-17 RX ADMIN — CITALOPRAM HYDROBROMIDE 40 MG: 20 TABLET ORAL at 22:03

## 2024-01-17 RX ADMIN — TAMSULOSIN HYDROCHLORIDE 0.4 MG: 0.4 CAPSULE ORAL at 22:03

## 2024-01-17 RX ADMIN — Medication 10 ML: at 22:07

## 2024-01-17 RX ADMIN — GABAPENTIN 300 MG: 300 CAPSULE ORAL at 16:30

## 2024-01-17 RX ADMIN — ASPIRIN 81 MG: 81 TABLET, COATED ORAL at 16:30

## 2024-01-17 NOTE — PLAN OF CARE
Goal Outcome Evaluation:   A/O x4 RA no C/Opain,n,v. C/O SOA while ambulating also noted unsteady gait. Orthostatic's completed. Lives alone. Fall precautions

## 2024-01-17 NOTE — CONSULTS
[facility]   Consult Note    Patient Name: Jersey Celaya  : 1955  MRN: [id number]  Primary Care Physician:  Shailesh Thapa MD  Referring Physician: Shailesh Thapa MD  Date of admission: 2024    Subjective   Subjective     Reason for Consult/ Chief Complaint: Syncope with falls/right C1 lateral mass fracture.    HPI:  Jersey Celaya is a 68 y.o. male that presented to [facility] emergency department on 2020 for with complaints of 3 to 4-week intermittent lightheadedness/dizziness.  He has sustained numerous falls.  ED workup included CT head demonstrating no acute intracranial abnormality as well as CT of the neck demonstrating a lucency along the right lateral mass of C1 suggestive of fracture.  Neurosurgery was consulted for further evaluation and offer recommendations.  Upon my evaluation patient is awake and alert oriented.  Hard cervical collar in place.  Patient does endorse syncope complaints.  He reports several falls in the past.    He does have past cervical surgical history with extensive fusion and has lived with chronic neck pain.  He endorses no acute neck pain on any of his recent falls.  He denies any radicular arm pain, paresthesias, gait imbalance or leg weakness.    review of Systems   All systems were reviewed and negative except for: As mentioned in HPI    Personal History     Past Medical History:   Diagnosis Date    Depression     Depression     Low back pain        Past Surgical History:   Procedure Laterality Date    CARDIAC CATHETERIZATION N/A 3/22/2021    Procedure: Left Heart Cath with angiogram;  Surgeon: Mack French MD;  Location: [facility] CATH INVASIVE LOCATION;  Service: Cardiovascular;  Laterality: N/A;    CARDIAC CATHETERIZATION N/A 3/22/2021    Procedure: Left ventriculography;  Surgeon: Mack French MD;  Location: [facility] CATH INVASIVE LOCATION;  Service: Cardiovascular;  Laterality: N/A;    CERVICAL SPINE SURGERY      INNER EAR  SURGERY      WISDOM TOOTH EXTRACTION         Family History: family history includes Cancer in his father and mother; Hypertension in his mother. Otherwise pertinent FHx was reviewed and not pertinent to current issue.    Social History:  reports that he has been smoking cigarettes. He has been smoking an average of .5 packs per day. He has never used smokeless tobacco. He reports that he does not drink alcohol and does not use drugs.    Home Medications:  aspirin, atorvastatin, citalopram, fluticasone, meloxicam, metoprolol succinate XL, mirtazapine, tamsulosin, and tiZANidine    Allergies:  Allergies   Allergen Reactions    Pregabalin Swelling    Gluten Meal GI Intolerance       Objective    Objective     Vitals:   Temp:  [97.7 °F (36.5 °C)] 97.7 °F (36.5 °C)  Heart Rate:  [71-80] 80  Resp:  [16] 16  BP: (121-134)/(44-79) 121/54    Physical Exam:   Constitutional: Awake, alert   Eyes: PERRLA, sclerae anicteric, no conjunctival injection   HENT: NCAT, mucous membranes moist   Neck: Supple, no thyromegaly, no lymphadenopathy, trachea midline  Musculoskeletal: No bilateral ankle edema, no clubbing or cyanosis to extremities   Psychiatric: Appropriate affect, cooperative   Neurologic: Oriented x 3, strength symmetric in all extremities, negative Cathryn .   cranial Nerves grossly intact to confrontation, speech clear   Skin: No rashes     Result Review    Result Review:  I have personally reviewed the results from the time of this admission to 1/17/2024 13:51 EST and agree with these findings:  []  Laboratory list / accordion  []  Microbiology  [x]  Radiology  []  EKG/Telemetry   []  Cardiology/Vascular   []  Pathology  []  Old records  []  Other:  Most notable findings include: Linear fracture along the superior articulating surface of the right lateral mass. I Do not appreciate any significant displacement.      Assessment & Plan   Assessment / Plan     Brief Patient Summary:  Jersey CROSS Yomi is a 68 y.o. male  who presents with a small linear fracture along the right superior articulating surface of the right lateral mass.  Chronicity questioned as patient fairly asymptomatic.  Given setting of recent trauma, treatment consist of  cervical immobilization 8 to 12 weeks.  Patient should continue with hard cervical collar on at all times.  He will need follow-up in outpatient clinic in 8 weeks with repeat imaging.  Further workup for syncopal episodes per medicine.  Please call for any questions or concerns      Active Hospital Problems:  There are no active hospital problems to display for this patient.    Plan:     -Hard cervical collar on at all times  - Follow-up outpatient clinic 8 weeks with serial imaging  - Further syncopal workup per medicine  - Please call for any questions or concerns.    I discussed findings with patient, consulting provider ATIF Fields and Dr. Berkowitz.    ATIF Velez

## 2024-01-17 NOTE — THERAPY EVALUATION
Patient Name: Jersey Celaya  : 1955    MRN: [id number]                              Today's Date: 2024       Admit Date: 2024    Visit Dx:     ICD-10-CM ICD-9-CM   1. Closed fracture of multiple ribs of right side, initial encounter  S22.41XA 807.09   2. Closed nondisplaced lateral mass fracture of first cervical vertebra, initial encounter  S12.041A 805.01   3. Frequent falls  R29.6 V15.88     Patient Active Problem List   Diagnosis    Anxiety disorder    Back pain    Spinal stenosis of cervical region    Degeneration of lumbar or lumbosacral intervertebral disc    Depression    Encounter for other specified aftercare    Neck pain    Osteoarthritis    Other cervical disc degeneration, unspecified cervical region    Sensorimotor neuropathy    Angina at rest    Abnormal nuclear stress test    Frequent falls     Past Medical History:   Diagnosis Date    Depression     Depression     Dizziness     Falls     Low back pain      Past Surgical History:   Procedure Laterality Date    CARDIAC CATHETERIZATION N/A 3/22/2021    Procedure: Left Heart Cath with angiogram;  Surgeon: Mack French MD;  Location: [facility] CATH INVASIVE LOCATION;  Service: Cardiovascular;  Laterality: N/A;    CARDIAC CATHETERIZATION N/A 3/22/2021    Procedure: Left ventriculography;  Surgeon: Mack French MD;  Location: [facility] CATH INVASIVE LOCATION;  Service: Cardiovascular;  Laterality: N/A;    CERVICAL SPINE SURGERY      INNER EAR SURGERY      WISDOM TOOTH EXTRACTION        General Information       Row Name 24 1718          OT Time and Intention    Document Type evaluation  -MP     Mode of Treatment individual therapy  -MP       Row Name 24 1718          General Information    Patient Profile Reviewed yes  -MP     Prior Level of Function independent:;ADL's  -MP       Row Name 24 1718          Living Environment    People in Home alone  -MP       Row Name 24 1718          Cognition    Orientation  Status (Cognition) oriented to;person;place  -       Row Name 01/17/24 1718          Safety Issues, Functional Mobility    Impairments Affecting Function (Mobility) cognition;endurance/activity tolerance;balance  -               User Key  (r) = Recorded By, (t) = Taken By, (c) = Cosigned By      Initials Name Provider Type    Richard Silva OT Occupational Therapist                     Mobility/ADL's       Row Name 01/17/24 1719          Transfers    Transfers sit-stand transfer  -       Row Name 01/17/24 1719          Sit-Stand Transfer    Sit-Stand [place] (Transfers) supervision  -       Row Name 01/17/24 1719          Functional Mobility    Functional Mobility- Ind. Level contact guard assist  -[facility] Name 01/17/24 1719          Activities of Daily Living    BADL Assessment/Intervention lower body dressing  -[facility] Name 01/17/24 1719          Lower Body Dressing Assessment/Training    [place] Level (Lower Body Dressing) lower body dressing skills;set up;supervision  -               User Key  (r) = Recorded By, (t) = Taken By, (c) = Cosigned By      Initials Name Provider Type    Richard Silva OT Occupational Therapist                   Obj/Interventions       Row Name 01/17/24 1719          Range of Motion Comprehensive    Comment, General Range of Motion BUE WFL  -[facility] Name 01/17/24 1719          Strength Comprehensive (MMT)    Comment, General Manual Muscle Testing (MMT) Assessment [facility]  -[facility] Name 01/17/24 1719          Balance    Balance Interventions sitting;sit to stand;standing;supported;static;dynamic  -               User Key  (r) = Recorded By, (t) = Taken By, (c) = Cosigned By      Initials Name Provider Type    Richard Silva OT Occupational Therapist                   Goals/Plan       Row Name 01/17/24 1724          Bed Mobility Goal 1 (OT)    Activity/Assistive Device (Bed Mobility Goal 1, OT) bed mobility activities, all   -MP     [place] Level/Cues Needed (Bed Mobility Goal 1, OT) independent  -MP     Time Frame (Bed Mobility Goal 1, OT) long term goal (LTG);3 weeks  -MP       Row Name 01/17/24 1724          Transfer Goal 1 (OT)    Activity/Assistive Device (Transfer Goal 1, OT) transfers, all  -MP     [place] Level/Cues Needed (Transfer Goal 1, OT) independent  -MP     Time Frame (Transfer Goal 1, OT) long term goal (LTG);2 weeks  -MP       Row Name 01/17/24 1724          Dressing Goal 1 (OT)    Activity/Device (Dressing Goal 1, OT) dressing skills, all  -MP     [place]/Cues Needed (Dressing Goal 1, OT) independent  -MP     Time Frame (Dressing Goal 1, OT) long term goal (LTG);2 weeks  -MP               User Key  (r) = Recorded By, (t) = Taken By, (c) = Cosigned By      Initials Name Provider Type    MP Richard Tristan, OT Occupational Therapist                   Clinical Impression       Row Name 01/17/24 1720          Pain Assessment    Pretreatment Pain Rating 0/10 - no pain  -MP     Posttreatment Pain Rating 0/10 - no pain  -MP       Row Name 01/17/24 1720          Plan of Care Review    Plan of Care Reviewed With patient  -MP     Progress no change  -MP     Outcome Evaluation Pt. is 69 y/o male admit w/ 3 to 4 weeks  intermittent lightheadedness/dizziness feeling as if he may pass out, notable frequent falls he is unsure how many times he has fallen over the last few weeks.CT of the neck demonstrating a lucency along the right lateral mass of C1 suggestive of fracture. Neuro sx recommending hard c-collar 8-12 weeks w/ 8 week follow up. Pt. states she lives at home, son lives there too but also states she has not seen him in several weeks, works during the day. Pt. primarily lives home alone, nephew present today. Pt. appears confused to year and situation, alhtough acknowledges he must wear his c-collar  untill follow up appt. Pt. completes functional transfers this date w/ supervision, rolling walker support  provided and patient ambulates long distances w/ CGA. Setup assist provided for all LB dressing completed at bedside. Pt. BP WNL, no orthostatic hypotension noted, HR and O2 sats also stable throughout. Pt. w/o c/o dizziness. Recommend d/c home w/ family support and HH therapy to assess home safety and mitigate falls. Will follow up w/ pt. 1-3x per week at [facility].  -MP       Row Name 01/17/24 1720          Therapy Assessment/Plan (OT)    Rehab Potential (OT) good, to achieve stated therapy goals  -MP     Criteria for Skilled Therapeutic Interventions Met (OT) yes;meets criteria;skilled treatment is necessary  -MP     Therapy Frequency (OT) 3 times/wk  -MP       Row Name 01/17/24 1720          Therapy Plan Review/Discharge Plan (OT)    Anticipated Discharge Disposition (OT) home with assist;home with home health  -MP       Row Name 01/17/24 1720          Vital Signs    Pre Patient Position Supine  -MP     Intra Patient Position Standing  -MP     Post Patient Position Supine  -MP       Row Name 01/17/24 1720          Positioning and Restraints    Pre-Treatment Position in bed  -MP     Post Treatment Position bed  -MP     In Bed supine;call light within reach;encouraged to call for assist;exit alarm on  -MP               User Key  (r) = Recorded By, (t) = Taken By, (c) = Cosigned By      Initials Name Provider Type    Richard Silva, CHARLES Occupational Therapist                   Outcome Measures       Row Name 01/17/24 1521          How much help from another person do you currently need...    Turning from your back to your side while in flat bed without using bedrails? 4  -RR     Moving from lying on back to sitting on the side of a flat bed without bedrails? 4  -RR     Moving to and from a bed to a chair (including a wheelchair)? 3  -RR     Standing up from a chair using your arms (e.g., wheelchair, bedside chair)? 3  -RR     Climbing 3-5 steps with a railing? 2  -RR     To walk in hospital room? 2  -RR     AM-PAC  6 Clicks Score (PT) 18  -RR     Highest Level of Mobility Goal 6 --> Walk 10 steps or more  -RR               User Key  (r) = Recorded By, (t) = Taken By, (c) = Cosigned By      Initials Name Provider Type    RR Chelly Clark, RN Registered Nurse                    Occupational Therapy Education       Title: PT OT SLP Therapies (In Progress)       Topic: Occupational Therapy (In Progress)       Point: ADL training (Not Started)       Description:   Instruct learner(s) on proper safety adaptation and remediation techniques during self care or transfers.   Instruct in proper use of assistive devices.                  Learner Progress:  Not documented in this visit.              Point: Home exercise program (Not Started)       Description:   Instruct learner(s) on appropriate technique for monitoring, assisting and/or progressing therapeutic exercises/activities.                  Learner Progress:  Not documented in this visit.              Point: Precautions (Not Started)       Description:   Instruct learner(s) on prescribed precautions during self-care and functional transfers.                  Learner Progress:  Not documented in this visit.              Point: Body mechanics (Done)       Description:   Instruct learner(s) on proper positioning and spine alignment during self-care, functional mobility activities and/or exercises.                  Learning Progress Summary             Patient Acceptance, E,TB, VU by MARI at 1/17/2024 1724                                         User Key       Initials Effective Dates Name Provider Type Discipline    MARI 06/16/21 -  Richard Tristan OT Occupational Therapist OT                  OT Recommendation and Plan  Therapy Frequency (OT): 3 times/wk  Plan of Care Review  Plan of Care Reviewed With: patient  Progress: no change  Outcome Evaluation: Pt. is 69 y/o male admit w/ 3 to 4 weeks  intermittent lightheadedness/dizziness feeling as if he may pass out, notable frequent  falls he is unsure how many times he has fallen over the last few weeks.CT of the neck demonstrating a lucency along the right lateral mass of C1 suggestive of fracture. Neuro sx recommending hard c-collar 8-12 weeks w/ 8 week follow up. Pt. states she lives at home, son lives there too but also states she has not seen him in several weeks, works during the day. Pt. primarily lives home alone, nephew present today. Pt. appears confused to year and situation, alhtough acknowledges he must wear his c-collar  untill follow up appt. Pt. completes functional transfers this date w/ supervision, rolling walker support provided and patient ambulates long distances w/ CGA. Setup assist provided for all LB dressing completed at bedside. Pt. BP WNL, no orthostatic hypotension noted, HR and O2 sats also stable throughout. Pt. w/o c/o dizziness. Recommend d/c home w/ family support and HH therapy to assess home safety and mitigate falls. Will follow up w/ pt. 1-3x per week at [facility].     Time Calculation:         Time Calculation- OT       Row Name 01/17/24 1725             Time Calculation- OT    OT Start Time 1540  -MP      OT Stop Time 1602  -      OT Time Calculation (min) 22 min  -      Total Timed Code Minutes- OT 0 minute(s)  -MP      OT Received On 01/17/24  -      OT - Next Appointment 01/19/24  -      OT Goal Re-Cert Due Date 01/31/24  -                User Key  (r) = Recorded By, (t) = Taken By, (c) = Cosigned By      Initials Name Provider Type     Richard Tristan OT Occupational Therapist                  Therapy Charges for Today       Code Description Service Date Service Provider Modifiers Qty    [id number]  OT EVAL LOW COMPLEXITY 4 1/17/2024 Richard Tristan OT GO 1                 Richard Tristan OT  1/17/2024

## 2024-01-17 NOTE — H&P
[facility] Observation Unit H&P    Patient Name: Jersey Celaya  : 1955  MRN: [id number]  Primary Care Physician: Shailesh Thapa MD  Date of admission: 2024     Patient Care Team:  Shailesh Thapa MD as PCP - General  Mack French MD as Consulting Physician (Cardiology)          Subjective   History Present Illness     Chief Complaint:   Chief Complaint   Patient presents with    Fall         History of Present Illness  Obtained from ED provider HPI on 2024:  Patient is a 68-year-old male who presents by private vehicle from primary care office.  He reports over the last 3 to 4 weeks he has had intermittent lightheadedness/dizziness feeling as if he may pass out, notable frequent falls he is unsure how many times he has fallen over the last few weeks.  He denies general weakness.  He denies pain.  Nephew at bedside states that he is also having intermittent memory loss.  Smokes cigarettes intermittently.    24 (post observation admission):  Patient confirms the HPI noted above though is somewhat vague especially regarding timing reporting that he has had multiple episodes of falls with some potential syncope/loss of consciousness.  He notes that he will at times experience some prodrome of lightheadedness/dizziness but other times simply wake up on the ground.  He also notes some dyspnea worse with exertion though at times at rest also which appears to be somewhat periodic as well as mild peripheral edema and approximately 25 pound weight loss though he is uncertain over what time this is occurred.  He does continue to smoke approximately 1/2 pack of cigarettes daily but does not drink alcohol.  He confirms compliance with his outpatient medical therapies and does not believe he has had any recent changes to his medications.  He notes no pain at present including rib or neck pain        Review of Systems   Constitutional: Positive for weight loss.   HENT: Negative.     Eyes: Negative.     Cardiovascular:  Positive for dyspnea on exertion, near-syncope and syncope. Negative for chest pain.   Respiratory:  Positive for shortness of breath.    Skin: Negative.    Musculoskeletal:  Positive for falls.   Gastrointestinal: Negative.    Genitourinary: Negative.    Neurological:  Positive for light-headedness.   Psychiatric/Behavioral: Negative.             Personal History     Past Medical History:   Past Medical History:   Diagnosis Date    Depression     Depression     Low back pain        Surgical History:      Past Surgical History:   Procedure Laterality Date    CARDIAC CATHETERIZATION N/A 3/22/2021    Procedure: Left Heart Cath with angiogram;  Surgeon: Mack French MD;  Location: [facility] CATH INVASIVE LOCATION;  Service: Cardiovascular;  Laterality: N/A;    CARDIAC CATHETERIZATION N/A 3/22/2021    Procedure: Left ventriculography;  Surgeon: Mack French MD;  Location: [facility] CATH INVASIVE LOCATION;  Service: Cardiovascular;  Laterality: N/A;    CERVICAL SPINE SURGERY      INNER EAR SURGERY      WISDOM TOOTH EXTRACTION             Family History: family history includes Cancer in his father and mother; Hypertension in his mother. Otherwise pertinent FHx was reviewed and unremarkable.     Social History:  reports that he has been smoking cigarettes. He has been smoking an average of .5 packs per day. He has never used smokeless tobacco. He reports that he does not drink alcohol and does not use drugs.      Medications:  Prior to Admission medications    Medication Sig Start Date End Date Taking? Authorizing Provider   albuterol sulfate  (90 Base) MCG/ACT inhaler Inhale 2 puffs Every 4 (Four) Hours As Needed for Wheezing.   Yes Naveen Angeles MD   aspirin 81 MG EC tablet Take 1 tablet by mouth Daily.   Yes Naveen Angeles MD   atorvastatin (LIPITOR) 10 MG tablet Take 1 tablet by mouth Every Night.   Yes Naveen Angeles MD   citalopram (CeleXA) 40 MG tablet Take 1 tablet by  mouth Every Night. 7/20/04  Yes Naveen Angeles MD   diphenoxylate-atropine (LOMOTIL) 2.5-0.025 MG per tablet Take 1 tablet by mouth 2 (Two) Times a Day As Needed for Diarrhea.   Yes Naveen Angeles MD   fluticasone (FLONASE) 50 MCG/ACT nasal spray 1 spray into the nostril(s) as directed by provider Daily. 4/3/20  Yes Naveen Angeles MD   gabapentin (NEURONTIN) 300 MG capsule Take 1 capsule by mouth 3 (Three) Times a Day.   Yes Naveen Angeles MD   meloxicam (MOBIC) 15 MG tablet Take 1 tablet by mouth Daily As Needed. 4/3/20  Yes Naveen Angeles MD   metoprolol succinate XL (TOPROL-XL) 25 MG 24 hr tablet Take 1 tablet by mouth Daily. 2/2/22  Yes Mack French MD   mirtazapine (REMERON) 15 MG tablet Take 1 tablet by mouth Every Night. 4/3/20  Yes Naveen Angeles MD   tamsulosin (FLOMAX) 0.4 MG capsule 24 hr capsule Take 1 capsule by mouth Every Night. 4/3/20  Yes Naveen Angeles MD   temazepam (RESTORIL) 15 MG capsule Take 1 capsule by mouth At Night As Needed for Sleep.   Yes Naveen Angeles MD   tiZANidine (ZANAFLEX) 4 MG tablet Take 1 tablet by mouth Every Night. 4/4/20  Yes Naveen Angeles MD   oxyCODONE-acetaminophen (PERCOCET)  MG per tablet Take 1 tablet by mouth Every 8 (Eight) Hours As Needed for Moderate Pain.  1/17/24  Naveen Angeles MD       Allergies:    Allergies   Allergen Reactions    Pregabalin Swelling    Gluten Meal GI Intolerance       Objective   Objective     Vital Signs  Temp:  [97.7 °F (36.5 °C)] 97.7 °F (36.5 °C)  Heart Rate:  [71-80] 73  Resp:  [16] 16  BP: (121-130)/(44-78) 128/73  SpO2:  [99 %-100 %] 100 %  on   ;      Body mass index is 21.31 kg/m².    Physical Exam  Vitals reviewed.   Constitutional:       General: He is not in acute distress.     Appearance: Normal appearance. He is normal weight. He is not ill-appearing, toxic-appearing or diaphoretic.   HENT:      Head: Normocephalic.      Right Ear: External ear  normal.      Left Ear: External ear normal.      Nose: Nose normal.      Mouth/Throat:      Mouth: Mucous membranes are moist.   Eyes:      Extraocular Movements: Extraocular movements intact.   Cardiovascular:      Rate and Rhythm: Normal rate and regular rhythm.      Pulses: Normal pulses.      Heart sounds: Normal heart sounds.   Pulmonary:      Effort: Pulmonary effort is normal.      Breath sounds: Normal breath sounds.   Abdominal:      General: Bowel sounds are normal.      Palpations: Abdomen is soft.      Tenderness: There is no abdominal tenderness.   Musculoskeletal:         General: Normal range of motion.      Cervical back: Normal range of motion.      Right lower leg: No edema.      Left lower leg: No edema.   Skin:     General: Skin is warm and dry.      Capillary Refill: Capillary refill takes less than 2 seconds.   Neurological:      General: No focal deficit present.      Mental Status: He is alert and oriented to person, place, and time.   Psychiatric:         Mood and Affect: Mood normal.         Behavior: Behavior normal.         Thought Content: Thought content normal.         Judgment: Judgment normal.           Results Review:  I have personally reviewed most recent cardiac tracings, lab results, and radiology images and interpretations and agree with findings, most notably: CMP, CBC, UA, chest x-ray, CT of head and C-spine and EKG.    Results from last 7 days   Lab Units 01/17/24  1201   WBC 10*3/mm3 4.10   HEMOGLOBIN g/dL 9.8*   HEMATOCRIT % 30.7*   PLATELETS 10*3/mm3 140     Results from last 7 days   Lab Units 01/17/24  1201   SODIUM mmol/L 135*   POTASSIUM mmol/L 4.0   CHLORIDE mmol/L 100   CO2 mmol/L 26.0   BUN mg/dL 10   CREATININE mg/dL 0.74*   GLUCOSE mg/dL 84   CALCIUM mg/dL 8.2*   ALK PHOS U/L 200*   ALT (SGPT) U/L 36   AST (SGOT) U/L 34     Estimated Creatinine Clearance: 94.9 mL/min (A) (by C-G formula based on SCr of 0.74 mg/dL (L)).  Brief Urine Lab Results  (Last result in  the past 365 days)        Color   Clarity   Blood   Leuk Est   Nitrite   Protein   CREAT   Urine HCG        01/17/24 1403 Yellow   Clear   Negative   Negative   Negative   Negative                   Microbiology Results (last 10 days)       ** No results found for the last 240 hours. **            ECG/EMG Results (most recent)       Procedure Component Value Units Date/Time    ECG 12 Lead Other; dizziness [[id number]] Collected: 01/17/24 1140     Updated: 01/17/24 1141     QT Interval 430 ms      QTC Interval 449 ms     Narrative:      HEART RATE= 66  bpm  RR Interval= 916  ms  KY Interval= 137  ms  P Horizontal Axis= -27  deg  P Front Axis= 138  deg  QRSD Interval= 91  ms  QT Interval= 430  ms  QTcB= 449  ms  QRS Axis= 72  deg  T Wave Axis= 87  deg  - ABNORMAL ECG -  Sinus or ectopic atrial rhythm  Atrial premature complex  Probable left atrial enlargement  Nonspecific T abnormalities, lateral leads  Electronically Signed By:   Date and Time of Study: 2024-01-17 11:40:23                    CT Head Without Contrast    Result Date: 1/17/2024  Impression: Head: 1. No acute intracranial intracranial process. 2. Post-operative changes of the right mastoid/middle ear, correlate clinically. 3. Left mastoid/middle ear effusion. Cervical spine: 1. Lucency of the right posterior superior C1 lateral mass, can not exclude a non-displaced acute fracture in the setting of trauma. 2. Changes of ACDF from C3 through C6. 3. Partially calcified thyroid nodule on the right, follow-up thyroid ultrasound is recommended. These findings were called to [facility] on 1/17/2024 at 1:26 p.m. Eastern standard time Electronically Signed: Linda Downey MD  1/17/2024 1:28 PM EST  Workstation ID: [id number]    CT Cervical Spine Without Contrast    Result Date: 1/17/2024  Impression: Head: 1. No acute intracranial intracranial process. 2. Post-operative changes of the right mastoid/middle ear, correlate clinically. 3. Left mastoid/middle  ear effusion. Cervical spine: 1. Lucency of the right posterior superior C1 lateral mass, can not exclude a non-displaced acute fracture in the setting of trauma. 2. Changes of ACDF from C3 through C6. 3. Partially calcified thyroid nodule on the right, follow-up thyroid ultrasound is recommended. These findings were called to [facility] on 1/17/2024 at 1:26 p.m. Eastern standard time Electronically Signed: Linda Downey MD  1/17/2024 1:28 PM EST  Workstation ID: [id number]    XR Chest 1 View    Result Date: 1/17/2024  Impression: 1.Acute right-sided rib fractures are suggested. Electronically Signed: Reuben Young MD  1/17/2024 12:27 PM EST  Workstation ID: [id number]       Estimated Creatinine Clearance: 94.9 mL/min (A) (by C-G formula based on SCr of 0.74 mg/dL (L)).    Assessment & Plan   Assessment/Plan       Active Hospital Problems    Diagnosis  POA    **Frequent falls [R29.6]  Not Applicable      Resolved Hospital Problems   No resolved problems to display.       Frequent falls with possible nondisplaced fracture of C1  -Hemoglobin: 9.8 with a slightly increased MCV, monitor  -UA unremarkable  -Chest x-ray showed acute right-sided rib fracture  -CT of head showed no acute intracranial process with postoperative changes of right mastoid/middle ear as well as left mastoid middle ear effusion  -CT of C-spine showed lucency of the posterior superior C1 lateral mass with radiologist noting they could not exclude a nondisplaced acute fracture in the setting of trauma.  Changes of ACDF from C3-C6 were noted as well as partially calcified thyroid nodule with recommended for thyroid ultrasound  -EKG showed sinus rhythm at 66 with PACs present and some baseline artifact without obvious acute changes and a QTc of 449 ms  -Rigid cervical collar applied in the ED, continue  -Neurosurgery consulted in ED who recommended continuing cervical collar and outpatient follow-up in 8 weeks  -Check TSH, A1c, lipid panel and  B12  -Fall precautions  -PT/OT consulted  -Echocardiogram  -Telemetry    History of CAD/hypertension  BP Readings from Last 1 Encounters:   01/17/24 128/73   - Continue aspirin, statin and metoprolol  - Monitor while admitted    COPD  -Albuterol    Depression/anxiety  -Continue Celexa, Remeron and temazepam    Hyperlipidemia  -Statin              VTE Prophylaxis -   Mechanical Order History:       None          Pharmalogical Order History:       None            CODE STATUS:    There are no questions and answers to display.       This patient has been examined wearing personal protective equipment.     I discussed the patient's findings and my recommendations with patient and nursing staff.      Signature:Electronically signed by Pedro Blum PA-C, 01/17/24, 3:37 PM EST.

## 2024-01-17 NOTE — PLAN OF CARE
Goal Outcome Evaluation:  Plan of Care Reviewed With: patient        Progress: no change  Outcome Evaluation: Pt. is 67 y/o male admit w/ 3 to 4 weeks  intermittent lightheadedness/dizziness feeling as if he may pass out, notable frequent falls he is unsure how many times he has fallen over the last few weeks.CT of the neck demonstrating a lucency along the right lateral mass of C1 suggestive of fracture. Neuro sx recommending hard c-collar 8-12 weeks w/ 8 week follow up. Pt. states she lives at home, son lives there too but also states she has not seen him in several weeks, works during the day. Pt. primarily lives home alone, nephew present today. Pt. appears confused to year and situation, janiough acknowledges he must wear his c-collar  untill follow up appt. Pt. completes functional transfers this date w/ supervision, rolling walker support provided and patient ambulates long distances w/ CGA. Setup assist provided for all LB dressing completed at bedside. Pt. BP WNL, no orthostatic hypotension noted, HR and O2 sats also stable throughout. Pt. w/o c/o dizziness. Recommend d/c home w/ family support and HH therapy to assess home safety and mitigate falls.Recommend handout regarding aspen c-collar maintenance and rolling walker at d/c. Will follow up w/ pt. 1-3x per week at [facility].    Anticipated Discharge Disposition (OT): home with assist, home with home health

## 2024-01-17 NOTE — ED PROVIDER NOTES
Subjective   History of Present Illness  Chief Complaint: Dizziness, frequent falls      HPI: Patient is a 68-year-old male who presents by private vehicle from primary care office.  He reports over the last 3 to 4 weeks he has had intermittent lightheadedness/dizziness feeling as if he may pass out, notable frequent falls he is unsure how many times he has fallen over the last few weeks.  He denies general weakness.  He denies pain.  Nephew at bedside states that he is also having intermittent memory loss.  Smokes cigarettes intermittently.    PCP: Alanis  Cardio: Manolo    History provided by:  Patient      Review of Systems   Musculoskeletal:  Positive for arthralgias.   Skin:  Positive for wound.   Neurological:  Positive for dizziness and light-headedness.       Past Medical History:   Diagnosis Date    Depression     Depression     Dizziness     Falls     Low back pain        Allergies   Allergen Reactions    Pregabalin Swelling    Gluten Meal GI Intolerance       Past Surgical History:   Procedure Laterality Date    CARDIAC CATHETERIZATION N/A 3/22/2021    Procedure: Left Heart Cath with angiogram;  Surgeon: Mack French MD;  Location: [facility] CATH INVASIVE LOCATION;  Service: Cardiovascular;  Laterality: N/A;    CARDIAC CATHETERIZATION N/A 3/22/2021    Procedure: Left ventriculography;  Surgeon: Mack French MD;  Location: [facility] CATH INVASIVE LOCATION;  Service: Cardiovascular;  Laterality: N/A;    CERVICAL SPINE SURGERY      INNER EAR SURGERY      WISDOM TOOTH EXTRACTION         Family History   Problem Relation Age of Onset    Cancer Mother     Hypertension Mother     Cancer Father        Social History     Socioeconomic History    Marital status: Single   Tobacco Use    Smoking status: Every Day     Packs/day: .5     Types: Cigarettes    Smokeless tobacco: Never   Vaping Use    Vaping Use: Never used   Substance and Sexual Activity    Alcohol use: Never    Drug use: Yes     Types: Amphetamines,  "Marijuana     Comment: last used meth smoked 1/16/24    Sexual activity: Defer           Objective   Physical Exam  Vitals reviewed.   Constitutional:       Appearance: He is ill-appearing.   HENT:      Head: Normocephalic.        Comments: Abrasion noted     Ears:      Comments: Bleeding or drainage from the ears or nose.  No jaimes signs or raccoon eyes     Mouth/Throat:      Mouth: Mucous membranes are dry.   Eyes:      Extraocular Movements: Extraocular movements intact.      Pupils: Pupils are equal, round, and reactive to light.   Neck:      Comments: No bony step-offs or crepitus, no midline tenderness  Cardiovascular:      Rate and Rhythm: Normal rate.      Pulses: Normal pulses.      Heart sounds: Normal heart sounds.   Pulmonary:      Effort: Pulmonary effort is normal.      Breath sounds: Normal breath sounds.   Abdominal:      General: Bowel sounds are normal.      Palpations: Abdomen is soft.   Musculoskeletal:         General: Normal range of motion.   Skin:     General: Skin is warm and dry.   Neurological:      General: No focal deficit present.      Mental Status: He is alert and oriented to person, place, and time. Mental status is at baseline.         Procedures      EKG obtained sinus rate of 66 atrial premature complex reviewed with Dr. Moody who agrees with interpretation.           ED Course  ED Course as of 01/17/24 1904 Wed Jan 17, 2024   1351 Spoke with Arti nurse practitioner with neurosurgery who advises c-collar and follow-up 6 weeks.  Does not require additional imaging. [BH]      ED Course User Index  [BH] Dayana Camarillo APRN      /82 (BP Location: Left arm, Patient Position: Standing)   Pulse 73   Temp 97.4 °F (36.3 °C) (Oral)   Resp 16   Ht 180.3 cm (71\")   Wt 68.9 kg (152 lb)   SpO2 100%   BMI 21.20 kg/m²   Labs Reviewed   COMPREHENSIVE METABOLIC PANEL - Abnormal; Notable for the following components:       Result Value    Creatinine 0.74 (*)     Sodium 135 (*) "     Calcium 8.2 (*)     Alkaline Phosphatase 200 (*)     All other components within normal limits    Narrative:     GFR Normal >60  Chronic Kidney Disease <60  Kidney Failure <15     CBC WITH AUTO DIFFERENTIAL - Abnormal; Notable for the following components:    RBC 3.14 (*)     Hemoglobin 9.8 (*)     Hematocrit 30.7 (*)     MCV 97.8 (*)     RDW 24.9 (*)     RDW-SD 88.4 (*)     Lymphocyte % 15.9 (*)     Monocyte % 14.2 (*)     All other components within normal limits   LIPID PANEL - Abnormal; Notable for the following components:    HDL Cholesterol 32 (*)     All other components within normal limits    Narrative:     Cholesterol Reference Ranges  (U.S. Department of Health and Human Services ATP III Classifications)    Desirable          <200 mg/dL  Borderline High    200-239 mg/dL  High Risk          >240 mg/dL      Triglyceride Reference Ranges  (U.S. Department of Health and Human Services ATP III Classifications)    Normal           <150 mg/dL  Borderline High  150-199 mg/dL  High             200-499 mg/dL  Very High        >500 mg/dL    HDL Reference Ranges  (U.S. Department of Health and Human Services ATP III Classifications)    Low     <40 mg/dl (major risk factor for CHD)  High    >60 mg/dl ('negative' risk factor for CHD)        LDL Reference Ranges  (U.S. Department of Health and Human Services ATP III Classifications)    Optimal          <100 mg/dL  Near Optimal     100-129 mg/dL  Borderline High  130-159 mg/dL  High             160-189 mg/dL  Very High        >189 mg/dL   IRON PROFILE - Abnormal; Notable for the following components:    Iron 33 (*)     Iron Saturation (TSAT) 7 (*)     All other components within normal limits   FERRITIN - Abnormal; Notable for the following components:    Ferritin 18.83 (*)     All other components within normal limits    Narrative:     Results may be falsely decreased if patient taking Biotin.     URINALYSIS W/ CULTURE IF INDICATED - Normal    Narrative:     In  absence of clinical symptoms, the presence of pyuria, bacteria, and/or nitrites on the urinalysis result does not correlate with infection.  Urine microscopic not indicated.   TSH - Normal   HEMOGLOBIN A1C - Normal   CBC AND DIFFERENTIAL    Narrative:     The following orders were created for panel order CBC & Differential.  Procedure                               Abnormality         Status                     ---------                               -----------         ------                     CBC Auto Differential[[id number]]        Abnormal            Final result                 Please view results for these tests on the individual orders.     Medications   sodium chloride 0.9 % flush 10 mL (has no administration in time range)   albuterol (PROVENTIL) nebulizer solution 0.083% 2.5 mg/3mL (has no administration in time range)   aspirin EC tablet 81 mg (81 mg Oral Given 1/17/24 1630)   atorvastatin (LIPITOR) tablet 10 mg (has no administration in time range)   citalopram (CeleXA) tablet 40 mg (has no administration in time range)   gabapentin (NEURONTIN) capsule 300 mg (300 mg Oral Given 1/17/24 1630)   meloxicam (MOBIC) tablet 15 mg (has no administration in time range)   metoprolol succinate XL (TOPROL-XL) 24 hr tablet 25 mg (25 mg Oral Given 1/17/24 1630)   mirtazapine (REMERON) tablet 15 mg (has no administration in time range)   tamsulosin (FLOMAX) 24 hr capsule 0.4 mg (has no administration in time range)   temazepam (RESTORIL) capsule 15 mg (has no administration in time range)   sodium chloride 0.9 % flush 10 mL (10 mL Intravenous Given 1/17/24 1631)   sodium chloride 0.9 % flush 10 mL (has no administration in time range)   sodium chloride 0.9 % infusion 40 mL (has no administration in time range)   acetaminophen (TYLENOL) tablet 650 mg (has no administration in time range)     Or   acetaminophen (TYLENOL) 160 MG/5ML oral solution 650 mg (has no administration in time range)     Or   acetaminophen  (TYLENOL) suppository 650 mg (has no administration in time range)   aluminum-magnesium hydroxide-simethicone (MAALOX MAX) 400-400-40 MG/5ML suspension 15 mL (has no administration in time range)   ondansetron ODT (ZOFRAN-ODT) disintegrating tablet 4 mg (has no administration in time range)     Or   ondansetron (ZOFRAN) injection 4 mg (has no administration in time range)   melatonin tablet 5 mg (has no administration in time range)   nitroglycerin (NITROSTAT) SL tablet 0.4 mg (has no administration in time range)   Potassium Replacement - Follow Nurse / BPA Driven Protocol (has no administration in time range)   Magnesium Standard Dose Replacement - Follow Nurse / BPA Driven Protocol (has no administration in time range)   Phosphorus Replacement - Follow Nurse / BPA Driven Protocol (has no administration in time range)   Calcium Replacement - Follow Nurse / BPA Driven Protocol (has no administration in time range)   sennosides-docusate (PERICOLACE) 8.6-50 MG per tablet 2 tablet (has no administration in time range)     And   polyethylene glycol (MIRALAX) packet 17 g (has no administration in time range)     And   bisacodyl (DULCOLAX) EC tablet 5 mg (has no administration in time range)     And   bisacodyl (DULCOLAX) suppository 10 mg (has no administration in time range)   ketorolac (TORADOL) injection 15 mg (has no administration in time range)     CT Head Without Contrast    Result Date: 1/17/2024  Impression: Head: 1. No acute intracranial intracranial process. 2. Post-operative changes of the right mastoid/middle ear, correlate clinically. 3. Left mastoid/middle ear effusion. Cervical spine: 1. Lucency of the right posterior superior C1 lateral mass, can not exclude a non-displaced acute fracture in the setting of trauma. 2. Changes of ACDF from C3 through C6. 3. Partially calcified thyroid nodule on the right, follow-up thyroid ultrasound is recommended. These findings were called to [facility] on  1/17/2024 at 1:26 p.m. Eastern standard time Electronically Signed: Linda Downey MD  1/17/2024 1:28 PM EST  Workstation ID: [id number]    CT Cervical Spine Without Contrast    Result Date: 1/17/2024  Impression: Head: 1. No acute intracranial intracranial process. 2. Post-operative changes of the right mastoid/middle ear, correlate clinically. 3. Left mastoid/middle ear effusion. Cervical spine: 1. Lucency of the right posterior superior C1 lateral mass, can not exclude a non-displaced acute fracture in the setting of trauma. 2. Changes of ACDF from C3 through C6. 3. Partially calcified thyroid nodule on the right, follow-up thyroid ultrasound is recommended. These findings were called to [facility] on 1/17/2024 at 1:26 p.m. Eastern standard time Electronically Signed: Linda Downey MD  1/17/2024 1:28 PM EST  Workstation ID: [id number]    XR Chest 1 View    Result Date: 1/17/2024  Impression: 1.Acute right-sided rib fractures are suggested. Electronically Signed: Reuben Young MD  1/17/2024 12:27 PM EST  Workstation ID: [id number]                                          Medical Decision Making  Patient presented with above complaint he was placed on a cardiac monitor and an IV established, as he reported frequent falls over the last few weeks, CT of the head was obtained was negative intracranial hemorrhage no evidence of fracture CT of the cervical spine did note a C1 lateral fracture, patient was placed in a hard c-collar, spoke with Zander nurse practitioner with neurosurgery, advised hard c-collar and follow-up in 6 weeks.  CBC noted no leukocytosis hemoglobin noted at 9.8 patient denies known blood loss, reviewed 2 years ago notes a hemoglobin of 12.3.  CMP notes no acute renal insufficiency or electrolyte imbalance chest x-ray was negative for pneumonia.  As patient presented with frequent falls he will be placed in the ED observation unit for plan for consult with PT OT, Zander with neurosurgery did  evaluate the patient at bedside as well.  I did discuss plan for admission and continued monitoring to which patient is given verbal understanding.  Spoke with Vikas BARRY in the ED observation unit who agreed to admission.    Also notes that patient was seen by Dr. French, outpatient who planned for echo, Vikas BARRY was notified and plan for testing during admission.    Chart review:  Seen outpatient with Dr. French yesterday coronary artery disease, hypertension, COPD.    Note Disclaimer: At [facility], we believe that sharing information builds trust and better  relationships. You are receiving this note because you recently visited [facility]. It is possible you will see health information before a provider has talked with you about it. This kind of information can be easy to misunderstand. To help you fully understand what it means for your health, we urge you to discuss this note with your provider.       Part of this note may be an electronic transcription/translation of spoken language to printed text using the Dragon Dictation System.    Appropriate PPE worn during exam.    Problems Addressed:  Closed fracture of multiple ribs of right side, initial encounter: complicated acute illness or injury  Closed nondisplaced lateral mass fracture of first cervical vertebra, initial encounter: complicated acute illness or injury  Frequent falls: complicated acute illness or injury    Amount and/or Complexity of Data Reviewed  Labs: ordered. Decision-making details documented in ED Course.  Radiology: ordered and independent interpretation performed. Decision-making details documented in ED Course.  ECG/medicine tests: ordered and independent interpretation performed. Decision-making details documented in ED Course.    Risk  Prescription drug management.  Decision regarding hospitalization.        Final diagnoses:   Closed fracture of multiple ribs of right side, initial encounter   Closed nondisplaced lateral mass  fracture of first cervical vertebra, initial encounter   Frequent falls       ED Disposition  ED Disposition       ED Disposition   Decision to Admit    Condition   --    Comment   --               Jaylen Berkowitz IV, MD  [address] [place] [address] [place] [facility]  [phone]    Follow up in 8 week(s)  Follow-up outpatient clinic 8 weeks with serial imaging         Medication List      No changes were made to your prescriptions during this visit.            Dayana Camarillo, APRN  01/17/24 1909

## 2024-01-18 VITALS
HEART RATE: 68 BPM | HEIGHT: 71 IN | SYSTOLIC BLOOD PRESSURE: 115 MMHG | OXYGEN SATURATION: 92 % | RESPIRATION RATE: 16 BRPM | DIASTOLIC BLOOD PRESSURE: 67 MMHG | WEIGHT: 155.42 LBS | TEMPERATURE: 97.8 F | BODY MASS INDEX: 21.76 KG/M2

## 2024-01-18 LAB
ACANTHOCYTES BLD QL SMEAR: NORMAL
AMPHET+METHAMPHET UR QL: POSITIVE
ANION GAP SERPL CALCULATED.3IONS-SCNC: 6 MMOL/L (ref 5–15)
BARBITURATES UR QL SCN: NEGATIVE
BASOPHILS # BLD AUTO: 0 10*3/MM3 (ref 0–0.2)
BASOPHILS NFR BLD AUTO: 0.3 % (ref 0–1.5)
BENZODIAZ UR QL SCN: NEGATIVE
BUN SERPL-MCNC: 14 MG/DL (ref 8–23)
BUN/CREAT SERPL: 20.9 (ref 7–25)
BURR CELLS BLD QL SMEAR: NORMAL
CALCIUM SPEC-SCNC: 8 MG/DL (ref 8.6–10.5)
CANNABINOIDS SERPL QL: NEGATIVE
CHLORIDE SERPL-SCNC: 105 MMOL/L (ref 98–107)
CO2 SERPL-SCNC: 26 MMOL/L (ref 22–29)
COCAINE UR QL: NEGATIVE
CREAT SERPL-MCNC: 0.67 MG/DL (ref 0.76–1.27)
DEPRECATED RDW RBC AUTO: 87.1 FL (ref 37–54)
EGFRCR SERPLBLD CKD-EPI 2021: 102.3 ML/MIN/1.73
EOSINOPHIL # BLD AUTO: 0.1 10*3/MM3 (ref 0–0.4)
EOSINOPHIL NFR BLD AUTO: 2.2 % (ref 0.3–6.2)
ERYTHROCYTE [DISTWIDTH] IN BLOOD BY AUTOMATED COUNT: 25.1 % (ref 12.3–15.4)
GIANT PLATELETS: NORMAL
GLUCOSE SERPL-MCNC: 89 MG/DL (ref 65–99)
HCT VFR BLD AUTO: 29.4 % (ref 37.5–51)
HGB BLD-MCNC: 9.5 G/DL (ref 13–17.7)
LARGE PLATELETS: NORMAL
LYMPHOCYTES # BLD AUTO: 0.8 10*3/MM3 (ref 0.7–3.1)
LYMPHOCYTES NFR BLD AUTO: 15.4 % (ref 19.6–45.3)
MAGNESIUM SERPL-MCNC: 1.8 MG/DL (ref 1.6–2.4)
MCH RBC QN AUTO: 32.3 PG (ref 26.6–33)
MCHC RBC AUTO-ENTMCNC: 32.4 G/DL (ref 31.5–35.7)
MCV RBC AUTO: 99.5 FL (ref 79–97)
METHADONE UR QL SCN: NEGATIVE
MONOCYTES # BLD AUTO: 0.7 10*3/MM3 (ref 0.1–0.9)
MONOCYTES NFR BLD AUTO: 14.2 % (ref 5–12)
NEUTROPHILS NFR BLD AUTO: 3.3 10*3/MM3 (ref 1.7–7)
NEUTROPHILS NFR BLD AUTO: 67.9 % (ref 42.7–76)
NRBC BLD AUTO-RTO: 0.3 /100 WBC (ref 0–0.2)
OPIATES UR QL: NEGATIVE
OXYCODONE UR QL SCN: NEGATIVE
PLATELET # BLD AUTO: 133 10*3/MM3 (ref 140–450)
PMV BLD AUTO: 10.9 FL (ref 6–12)
POIKILOCYTOSIS BLD QL SMEAR: NORMAL
POTASSIUM SERPL-SCNC: 3.9 MMOL/L (ref 3.5–5.2)
QT INTERVAL: 430 MS
QTC INTERVAL: 449 MS
RBC # BLD AUTO: 2.96 10*6/MM3 (ref 4.14–5.8)
RBC AGGLUT BLD QL: NORMAL
SMALL PLATELETS BLD QL SMEAR: NORMAL
SODIUM SERPL-SCNC: 137 MMOL/L (ref 136–145)
VIT B12 BLD-MCNC: <150 PG/ML (ref 211–946)
WBC MORPH BLD: NORMAL
WBC NRBC COR # BLD AUTO: 4.9 10*3/MM3 (ref 3.4–10.8)

## 2024-01-18 PROCEDURE — 85007 BL SMEAR W/DIFF WBC COUNT: CPT | Performed by: PHYSICIAN ASSISTANT

## 2024-01-18 PROCEDURE — 97162 PT EVAL MOD COMPLEX 30 MIN: CPT

## 2024-01-18 PROCEDURE — 80048 BASIC METABOLIC PNL TOTAL CA: CPT | Performed by: PHYSICIAN ASSISTANT

## 2024-01-18 PROCEDURE — G0378 HOSPITAL OBSERVATION PER HR: HCPCS

## 2024-01-18 PROCEDURE — 83735 ASSAY OF MAGNESIUM: CPT | Performed by: PHYSICIAN ASSISTANT

## 2024-01-18 PROCEDURE — 82607 VITAMIN B-12: CPT | Performed by: PHYSICIAN ASSISTANT

## 2024-01-18 PROCEDURE — 25010000002 NA FERRIC GLUC CPLX PER 12.5 MG: Performed by: PHYSICIAN ASSISTANT

## 2024-01-18 PROCEDURE — 85025 COMPLETE CBC W/AUTO DIFF WBC: CPT | Performed by: PHYSICIAN ASSISTANT

## 2024-01-18 PROCEDURE — 96365 THER/PROPH/DIAG IV INF INIT: CPT

## 2024-01-18 RX ORDER — PANTOPRAZOLE SODIUM 40 MG/1
40 TABLET, DELAYED RELEASE ORAL
Status: DISCONTINUED | OUTPATIENT
Start: 2024-01-18 | End: 2024-01-18 | Stop reason: HOSPADM

## 2024-01-18 RX ORDER — FERROUS SULFATE 324(65)MG
324 TABLET, DELAYED RELEASE (ENTERIC COATED) ORAL
Qty: 30 TABLET | Refills: 0 | Status: SHIPPED | OUTPATIENT
Start: 2024-01-18

## 2024-01-18 RX ORDER — LANOLIN ALCOHOL/MO/W.PET/CERES
1000 CREAM (GRAM) TOPICAL DAILY
Qty: 30 TABLET | Refills: 0 | Status: SHIPPED | OUTPATIENT
Start: 2024-01-18

## 2024-01-18 RX ORDER — NICOTINE 21 MG/24HR
1 PATCH, TRANSDERMAL 24 HOURS TRANSDERMAL
Status: DISCONTINUED | OUTPATIENT
Start: 2024-01-18 | End: 2024-01-18 | Stop reason: HOSPADM

## 2024-01-18 RX ADMIN — GABAPENTIN 300 MG: 300 CAPSULE ORAL at 15:44

## 2024-01-18 RX ADMIN — PANTOPRAZOLE SODIUM 40 MG: 40 TABLET, DELAYED RELEASE ORAL at 11:45

## 2024-01-18 RX ADMIN — Medication 10 ML: at 12:49

## 2024-01-18 RX ADMIN — GABAPENTIN 300 MG: 300 CAPSULE ORAL at 08:39

## 2024-01-18 RX ADMIN — Medication 10 ML: at 08:39

## 2024-01-18 RX ADMIN — ASPIRIN 81 MG: 81 TABLET, COATED ORAL at 08:39

## 2024-01-18 RX ADMIN — METOPROLOL SUCCINATE 25 MG: 25 TABLET, EXTENDED RELEASE ORAL at 08:39

## 2024-01-18 RX ADMIN — SODIUM CHLORIDE 125 MG: 9 INJECTION, SOLUTION INTRAVENOUS at 11:46

## 2024-01-18 RX ADMIN — Medication 1 PATCH: at 13:11

## 2024-01-18 RX ADMIN — Medication 10 ML: at 11:50

## 2024-01-18 NOTE — CASE MANAGEMENT/SOCIAL WORK
Discharge Planning Assessment   [facility]     Patient Name: Jersey Celaya  MRN: [id number]  Today's Date: 1/18/2024    Admit Date: 1/17/2024    Plan: Home w/ HH (pending acceptance).   Discharge Needs Assessment       Row Name 01/18/24 1211       Living Environment    People in Home alone    Current Living Arrangements home    Potentially Unsafe Housing Conditions none    In the past 12 months has the electric, gas, oil, or water company threatened to shut off services in your home? No    Primary Care Provided by self    Provides Primary Care For no one    Family Caregiver if Needed other relative(s)    Family Caregiver Names Lee- nephew    Quality of Family Relationships helpful;involved;supportive    Able to Return to Prior Arrangements yes       Resource/Environmental Concerns    Resource/Environmental Concerns none    Transportation Concerns none       Transportation Needs    In the past 12 months, has lack of transportation kept you from medical appointments or from getting medications? no    In the past 12 months, has lack of transportation kept you from meetings, work, or from getting things needed for daily living? No       Food Insecurity    Within the past 12 months, you worried that your food would run out before you got the money to buy more. Never true    Within the past 12 months, the food you bought just didn't last and you didn't have money to get more. Never true       Transition Planning    Patient/Family Anticipates Transition to home;home with help/services    Patient/Family Anticipated Services at Transition durable medical equipment;home health care    Transportation Anticipated car, drives self;family or friend will provide       Discharge Needs Assessment    Readmission Within the Last 30 Days no previous admission in last 30 days    Equipment Currently Used at Home cane, straight    Concerns to be Addressed discharge planning;substance/tobacco abuse/use    Anticipated Changes Related to  Illness none    Equipment Needed After Discharge walker, rolling    Outpatient/Agency/Support Group Needs homecare agency    Discharge Facility/Level of Care Needs home with home health                   Discharge Plan       Row Name 01/18/24 1214       Plan    Plan Home w/ HH (pending acceptance).    Plan Comments CM met with patient and nephew at the bedside. Confirmed PCP, insurance, and pharmacy. Patient denies any difficulty affording medications. Patient is not current with any HHC/OPPT/OT services. Patient lives at home alone, is mostly IADLS- requiring some help from nephew at times, and drives. nephew Lee will provide DC transport. PT rec home with home health and a rolling walker. Patient does not have preference for HH, will place referrals according to availability and in network with insurance. CM placed referral to [facility] for rolling walker and messaged liakimberly Wagner. DC Barriers: neurosurgery consult, further syncope work-up pending.                    Continued Care and Services - Admitted Since 1/17/2024       Durable Medical Equipment       Service Provider Request Status Selected Services Address Phone Fax Patient Preferred    [facility] Pending - Request Sent N/A [address] [address] [phone] --                  Expected Discharge Date and Time       Expected Discharge Date Expected Discharge Time    Jan 18, 2024            Demographic Summary       Row Name 01/18/24 1210       General Information    Admission Type observation    Arrived From emergency department    Required Notices Provided Observation Status Notice    Referral Source admission list    Reason for Consult discharge planning    Preferred Language English       Contact Information    Permission Granted to Share Info With     Contact Information Obtained for                    Functional Status       Row Name 01/18/24 1210       Functional Status     Usual Activity Tolerance moderate    Current Activity Tolerance fair       Functional Status, IADL    Medications independent;assistive person    Meal Preparation independent;assistive person    Housekeeping independent;assistive person    Laundry independent;assistive person    Shopping independent;assistive person       Mental Status    General Appearance WDL X;appearance    General Appearance body odor;unkempt;unshaven;unclean       Mental Status Summary    Recent Changes in Mental Status/Cognitive Functioning no changes             Brock Corbin RN      Cell number [phone]  Office number [phone]

## 2024-01-18 NOTE — DISCHARGE PLACEMENT REQUEST
"Doni Jaime (68 y.o. Male)       Date of Birth   1955    Social Security Number       Address   [address] [place] [address] [place] [facility]    Home Phone   [phone]    MRN   [id number]       Mandaeism   Religious    Marital Status   Single                            Admission Date   1/17/24    Admission Type   Emergency    Admitting Provider   Jaylen Rashid MD    Attending Provider   Jaylen Rashid MD    Department, Room/Bed   [facility] OBSERVATION, 110/1       Discharge Date       Discharge Disposition       Discharge Destination                                 Attending Provider: Jaylen Rashid MD    Allergies: Pregabalin, Gluten Meal    Isolation: None   Infection: None   Code Status: CPR    Ht: 180.3 cm (71\")   Wt: 70.5 kg (155 lb 6.8 oz)    Admission Cmt: None   Principal Problem: Frequent falls [R29.6]                   Active Insurance as of 1/17/2024       Primary Coverage       Payor Plan Insurance Group Employer/Plan Group    ANTHEM MEDICARE REPLACEMENT ANTHEM MEDICARE ADVANTAGE [id number]       Payor Plan Address Payor Plan Phone Number Payor Plan Fax Number Effective Dates    [address] [phone]  1/1/2022 - None Entered    [facility] [address]         Subscriber Name Subscriber Birth Date Member ID       DONI JAIME 1955 [id number]                     Emergency Contacts        (Rel.) Home Phone Work Phone Mobile Phone    Nahum Torrez (Friend) -- -- [phone]    MARI JAIME (Son) -- [phone]                "

## 2024-01-18 NOTE — CASE MANAGEMENT/SOCIAL WORK
Social Work Assessment  [facility]     Patient Name: Jersey Celaya  MRN: [id number]  Today's Date: 1/18/2024    Admit Date: 1/17/2024         Discharge Plan       Row Name 01/18/24 1231       Plan    Plan Comments Pt's friend, Nahum called SW back. He reports that no one talks to Pt son and not sure how to reach him. Nahum reports taht Pt falls all the time and is often very confused and they are worried about him. Nahum reports that if they get in touch with son, they will call back to give Sw information. He confirms no spouse, or other children.            MARIEL Orlando, MSW    Phone: [phone]  Fax: [phone]  Email: [email or web address]

## 2024-01-18 NOTE — CASE MANAGEMENT/SOCIAL WORK
Social Work Assessment  [facility]     Patient Name: Jersey Celaya  MRN: [id number]  Today's Date: 2024    Admit Date: 2024         Discharge Plan       Row Name 24 1212       Plan    Plan Comments Sw met with Pt at bedside. Pt's nephew Lee was also present at bedside. Pt was lying sideways and said he was uncomfortable but wouldn't Cm to help get him more comfortable. Pt reports that he lives alone and often has falls. Pt admitted to smoking meth often but doesn't feel its an issue to stop. Pt was able to tell me he was in the hospital, name, , today's date but said he didn't think we had a president. Pt would benefit from home health services and was agreeable to Yoli putting a [facility] referral in. Sw sent an email to [employer] to see if he is eligible for Medicaid and sent a referral to [facility] for caregiving, housekeeping, and meals via "[facility]". Pt reports that he drives himself. Pt's nephew stated once Pt had a fall and was down at home for two days before anyone could get to him. Yoli attempted to call Pt son to follow up but the phone number is no longer a working number. Yoli called Pt's other listed contact, Nahum Torrez and left a Non-descript VM.        Sukumar Naranjo, VINITAW, MSW    Phone: [phone]  Fax: [phone]  Email: [email or web address]

## 2024-01-18 NOTE — NURSING NOTE
Educated patient on discharge instructions. Patient verbalized understanding and had no further questions.concerns at this time. IV and heart monitor removed. Patient remains without distress. Walker at bedside for home and continues to wear cervical hard collar

## 2024-01-18 NOTE — PLAN OF CARE
Goal Outcome Evaluation:   A/O x4. Remains with cervical collar on. No C/O pain,N,V. Weakness unsteady gait assist x1 walker and gait belt. Incontience of B/B at times.

## 2024-01-18 NOTE — THERAPY EVALUATION
Patient Name: Jersey Celaya  : 1955    MRN: [id number]                              Today's Date: 2024       Admit Date: 2024    Visit Dx:     ICD-10-CM ICD-9-CM   1. Closed fracture of multiple ribs of right side, initial encounter  S22.41XA 807.09   2. Closed nondisplaced lateral mass fracture of first cervical vertebra, initial encounter  S12.041A 805.01   3. Frequent falls  R29.6 V15.88     Patient Active Problem List   Diagnosis    Anxiety disorder    Back pain    Spinal stenosis of cervical region    Degeneration of lumbar or lumbosacral intervertebral disc    Depression    Encounter for other specified aftercare    Neck pain    Osteoarthritis    Other cervical disc degeneration, unspecified cervical region    Sensorimotor neuropathy    Angina at rest    Abnormal nuclear stress test    Frequent falls     Past Medical History:   Diagnosis Date    CAD (coronary artery disease)     COPD (chronic obstructive pulmonary disease)     Depression     Depression     Dizziness     Falls     Hyperlipidemia     Hypertension     Low back pain      Past Surgical History:   Procedure Laterality Date    CARDIAC CATHETERIZATION N/A 3/22/2021    Procedure: Left Heart Cath with angiogram;  Surgeon: Mack French MD;  Location: [facility] CATH INVASIVE LOCATION;  Service: Cardiovascular;  Laterality: N/A;    CARDIAC CATHETERIZATION N/A 3/22/2021    Procedure: Left ventriculography;  Surgeon: Mack French MD;  Location: [facility] CATH INVASIVE LOCATION;  Service: Cardiovascular;  Laterality: N/A;    CERVICAL SPINE SURGERY      INNER EAR SURGERY      WISDOM TOOTH EXTRACTION        General Information       Row Name 24 1046          Physical Therapy Time and Intention    Document Type evaluation  -CR     Mode of Treatment physical therapy  -CR       Row Name 24 1046          General Information    Existing Precautions/Restrictions fall;cervical collar  -CR     Barriers to Rehab medically  complex;ineffective coping  -CR       Row Name 01/18/24 1046          Living Environment    People in Home alone  patient reports his friend assists him with housecleaning,laundry  -CR       Row Name 01/18/24 1046          Home Main Entrance    Number of Stairs, Main Entrance one  -CR       Row Name 01/18/24 1046          Stairs Within Home, Primary    Number of Stairs, Within Home, Primary none  -CR       Row Name 01/18/24 1046          Cognition    Orientation Status (Cognition) oriented to;person;place;unable/difficult to assess  -CR       Row Name 01/18/24 1046          Safety Issues, Functional Mobility    Safety Issues Affecting Function (Mobility) at risk behavior observed;friction/shear risk;insight into deficits/self-awareness;judgment;safety precautions follow-through/compliance  -CR     Impairments Affecting Function (Mobility) balance;cognition;endurance/activity tolerance;shortness of breath  -CR               User Key  (r) = Recorded By, (t) = Taken By, (c) = Cosigned By      Initials Name Provider Type    CR Reyes, Carmela, PT Physical Therapist                   Mobility       Row Name 01/18/24 1056          Bed Mobility    Comment, (Bed Mobility) sitting EOB upon entry  -CR       Row Name 01/18/24 1056          Bed-Chair Transfer    Bed-Chair Cottle (Transfers) standby assist  -CR       Row Name 01/18/24 1056          Sit-Stand Transfer    Sit-Stand Cottle (Transfers) standby assist  -CR       Row Name 01/18/24 1056          Gait/Stairs (Locomotion)    Cottle Level (Gait) contact guard  -CR     Assistive Device (Gait) walker, front-wheeled  -CR     Distance in Feet (Gait) 100 ft x 2  -CR     Deviations/Abnormal Patterns (Gait) base of support, wide;gait speed decreased;festinating/shuffling  -CR     Bilateral Gait Deviations forward flexed posture  -CR               User Key  (r) = Recorded By, (t) = Taken By, (c) = Cosigned By      Initials Name Provider Type    CR Reyes, Carmela,  PT Physical Therapist                   Obj/Interventions       Row Name 01/18/24 1057          Range of Motion Comprehensive    General Range of Motion bilateral lower extremity ROM WFL  -CR       Row Name 01/18/24 1057          Strength Comprehensive (MMT)    General Manual Muscle Testing (MMT) Assessment no strength deficits identified  -CR       Row Name 01/18/24 1057          Balance    Balance Assessment sitting static balance;sitting dynamic balance;sit to stand dynamic balance;standing static balance;standing dynamic balance  -CR     Static Sitting Balance independent  -CR     Dynamic Sitting Balance independent  -CR     Position, Sitting Balance sitting edge of bed  -CR     Sit to Stand Dynamic Balance standby assist  -CR     Static Standing Balance standby assist  -CR     Dynamic Standing Balance standby assist  -CR     Position/Device Used, Standing Balance walker, front-wheeled  -CR               User Key  (r) = Recorded By, (t) = Taken By, (c) = Cosigned By      Initials Name Provider Type    CR Reyes, Carmela, PT Physical Therapist                   Goals/Plan       Row Name 01/18/24 1107          Gait Training Goal 1 (PT)    Activity/Assistive Device (Gait Training Goal 1, PT) gait (walking locomotion);assistive device use;decrease fall risk;diminish gait deviation;walker, rolling  -CR     Campbell Level (Gait Training Goal 1, PT) standby assist  -CR     Distance (Gait Training Goal 1, PT) 150 ft x 2  -CR     Time Frame (Gait Training Goal 1, PT) long term goal (LTG);1 week  -CR       Row Name 01/18/24 1107          Problem Specific Goal 1 (PT)    Problem Specific Goal 1 (PT) tolerate standing balance activities with SBA  -CR     Time Frame (Problem Specific Goal 1, PT) long-term goal (LTG);2 weeks  -CR       Row Name 01/18/24 1107          Therapy Assessment/Plan (PT)    Planned Therapy Interventions (PT) gait training;home exercise program;patient/family education;balance training  -CR                User Key  (r) = Recorded By, (t) = Taken By, (c) = Cosigned By      Initials Name Provider Type    CR Reyes, Carmela, PT Physical Therapist                   Clinical Impression       Row Name 01/18/24 1058          Pain    Pretreatment Pain Rating 0/10 - no pain  -CR     Posttreatment Pain Rating 0/10 - no pain  -CR       Row Name 01/18/24 1058          Plan of Care Review    Plan of Care Reviewed With patient  -CR     Outcome Evaluation 67 y/o male brought in hospital by family due to intermittent dizziness, lightheadedness resulting in falls as well as intermittent memory loss. CT showed C1 lateral mass suspicious for fx and R rib fx. Per neurosurgery, no surgical intervention at this time and patient issued hard cervical collar which patient needs to wear at all times. PMH Includes previous ACDF C3-6, depression, smoker. Patient lives alone with a friend assisting with housecleaning, laundry. Patient reports her normally does not use and ad for mobility and still able to manage community distance ambulation.At time of eval, patient reports no pain, nor dizziness or lightheadedness. No tingling , numbness to either UE/LE. Patient able to perform transfers with SBA without a.d. and ambulated 100 ft x 2 with SBA ; first with rw and next bout without a.d. Both times during gait, patient presented with wide LENNY, slow zamzam, flexed upper trunk, neck posture and ataxia. No c/o dizziness during ambulation , only reported of SOA at end of gait however spO2 100 % on room air. Patient may benefit from use of rw at discharge and  follow up for home assessment .  -CR       Row Name 01/18/24 1058          Therapy Assessment/Plan (PT)    Patient/Family Therapy Goals Statement (PT) home  -CR     Rehab Potential (PT) good, to achieve stated therapy goals  -CR     Criteria for Skilled Interventions Met (PT) yes;skilled treatment is necessary  -CR     Therapy Frequency (PT) 3 times/wk  -CR     Predicted Duration of  Therapy Intervention (PT) dc  -CR               User Key  (r) = Recorded By, (t) = Taken By, (c) = Cosigned By      Initials Name Provider Type    CR Reyes, Carmela, PT Physical Therapist                   Outcome Measures       Row Name 01/18/24 1108 01/18/24 0805       How much help from another person do you currently need...    Turning from your back to your side while in flat bed without using bedrails? 4  -CR 4  -RR    Moving from lying on back to sitting on the side of a flat bed without bedrails? 4  -CR 4  -RR    Moving to and from a bed to a chair (including a wheelchair)? 4  -CR 3  -RR    Standing up from a chair using your arms (e.g., wheelchair, bedside chair)? 4  -CR 3  -RR    Climbing 3-5 steps with a railing? 3  -CR 2  -RR    To walk in hospital room? 3  -CR 3  -RR    AM-PAC 6 Clicks Score (PT) 22  -CR 19  -RR    Highest Level of Mobility Goal 7 --> Walk 25 feet or more  -CR 6 --> Walk 10 steps or more  -RR              User Key  (r) = Recorded By, (t) = Taken By, (c) = Cosigned By      Initials Name Provider Type    CR Reyes, Carmela, PT Physical Therapist    Chelly Tucker RN Registered Nurse                                 Physical Therapy Education       Title: PT OT SLP Therapies (In Progress)       Topic: Physical Therapy (In Progress)       Point: Mobility training (In Progress)       Learning Progress Summary             Patient Acceptance, E, NR by CR at 1/18/2024 1109                         Point: Home exercise program (Not Started)       Learner Progress:  Not documented in this visit.              Point: Body mechanics (Not Started)       Learner Progress:  Not documented in this visit.              Point: Precautions (In Progress)       Learning Progress Summary             Patient Acceptance, E, NR by CR at 1/18/2024 1109                                         User Key       Initials Effective Dates Name Provider Type [facility] 06/16/21 -  Reyes, Carmela, PT Physical  Therapist PT                  PT Recommendation and Plan  Planned Therapy Interventions (PT): gait training, home exercise program, patient/family education, balance training  Plan of Care Reviewed With: patient  Outcome Evaluation: 67 y/o male brought in hospital by family due to intermittent dizziness, lightheadedness resulting in falls as well as intermittent memory loss. CT showed C1 lateral mass suspicious for fx and R rib fx. Per neurosurgery, no surgical intervention at this time and patient issued hard cervical collar which patient needs to wear at all times. PMH Includes previous ACDF C3-6, depression, smoker. Patient lives alone with a friend assisting with housecleaning, laundry. Patient reports her normally does not use and ad for mobility and still able to manage community distance ambulation.At time of eval, patient reports no pain, nor dizziness or lightheadedness. No tingling , numbness to either UE/LE. Patient able to perform transfers with SBA without a.d. and ambulated 100 ft x 2 with SBA ; first with rw and next bout without a.d. Both times during gait, patient presented with wide LENNY, slow zamzam, flexed upper trunk, neck posture and ataxia. No c/o dizziness during ambulation , only reported of SOA at end of gait however spO2 100 % on room air. Patient may benefit from use of rw at discharge and HH follow up for home assessment .     Time Calculation:         PT Charges       Row Name 01/18/24 1110             Time Calculation    Start Time 0905  -CR      Stop Time 0922  -CR      Time Calculation (min) 17 min  -CR      PT Received On 01/18/24  -CR      PT - Next Appointment 01/19/24  -CR      PT Goal Re-Cert Due Date 02/01/24  -CR         Time Calculation- PT    Total Timed Code Minutes- PT 0 minute(s)  -CR                User Key  (r) = Recorded By, (t) = Taken By, (c) = Cosigned By      Initials Name Provider Type    CR Reyes, Carmela, PT Physical Therapist                  Therapy Charges  for Today       Code Description Service Date Service Provider Modifiers Qty    [id number] HC PT EVAL MOD COMPLEXITY 4 1/18/2024 Reyes, Carmela, PT GP 1            PT G-Codes  AM-PAC 6 Clicks Score (PT): 22  PT Discharge Summary  Anticipated Discharge Disposition (PT): home with home health    Carmela Reyes, PT  1/18/2024

## 2024-01-18 NOTE — PLAN OF CARE
Goal Outcome Evaluation:  Plan of Care Reviewed With: patient           Outcome Evaluation: 69 y/o male brought in hospital by family due to intermittent dizziness, lightheadedness resulting in falls as well as intermittent memory loss. CT showed C1 lateral mass suspicious for fx and R rib fx. Per neurosurgery, no surgical intervention at this time and patient issued hard cervical collar which patient needs to wear at all times. PMH Includes previous ACDF C3-6, depression, smoker. Patient lives alone with a friend assisting with housecleaning, laundry. Patient reports her normally does not use and ad for mobility and still able to manage community distance ambulation.At time of eval, patient reports no pain, nor dizziness or lightheadedness. No tingling , numbness to either UE/LE. Patient able to perform transfers with SBA without a.d. and ambulated 100 ft x 2 with SBA ; first with rw and next bout without a.d. Both times during gait, patient presented with wide LENNY, slow zamzam, flexed upper trunk, neck posture and ataxia. No c/o dizziness during ambulation , only reported of SOA at end of gait however spO2 100 % on room air. Patient may benefit from use of rw at discharge and HH follow up for home assessment .      Anticipated Discharge Disposition (PT): home with home health

## 2024-01-18 NOTE — DISCHARGE SUMMARY
[facility]    Shailesh Thapa MD    CHIEF COMPLAINT:     Falls with syncope    HISTORY OF PRESENT ILLNESS:    Obtained from ED provider HPI on 1/17/2024:  Patient is a 68-year-old male who presents by private vehicle from primary care office.  He reports over the last 3 to 4 weeks he has had intermittent lightheadedness/dizziness feeling as if he may pass out, notable frequent falls he is unsure how many times he has fallen over the last few weeks.  He denies general weakness.  He denies pain.  Nephew at bedside states that he is also having intermittent memory loss.  Smokes cigarettes intermittently.    01/17/24 (post observation admission):  Patient confirms the HPI noted above though is somewhat vague especially regarding timing reporting that he has had multiple episodes of falls with some potential syncope/loss of consciousness.  He notes that he will at times experience some prodrome of lightheadedness/dizziness but other times simply wake up on the ground.  He also notes some dyspnea worse with exertion though at times at rest also which appears to be somewhat periodic as well as mild peripheral edema and approximately 25 pound weight loss though he is uncertain over what time this is occurred.  He does continue to smoke approximately 1/2 pack of cigarettes daily but does not drink alcohol.  He confirms compliance with his outpatient medical therapies and does not believe he has had any recent changes to his medications.    1/18/2024:  Patient reports he did generally well overnight though he does continue to feel somewhat fatigued.  He was able to work with occupational therapy the previous day and physical therapy on the day of discharge who reported patient did well with no orthostatic changes or complaints of dizziness/lightheadedness.  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  He is open to assistance with cessation        Past Medical History:   Diagnosis Date    CAD (coronary artery disease)     COPD (chronic obstructive pulmonary disease)     Depression     Depression     Dizziness     Falls     Hyperlipidemia     Hypertension     Low back pain      Past Surgical History:   Procedure Laterality Date    CARDIAC CATHETERIZATION N/A 3/22/2021    Procedure: Left Heart Cath with angiogram;  Surgeon: Mack French MD;  Location: [facility] CATH INVASIVE LOCATION;  Service: Cardiovascular;  Laterality: N/A;    CARDIAC CATHETERIZATION N/A 3/22/2021    Procedure: Left ventriculography;  Surgeon: Mack French MD;  Location: [facility] CATH INVASIVE LOCATION;  Service: Cardiovascular;  Laterality: N/A;    CERVICAL SPINE SURGERY      INNER EAR SURGERY      WISDOM TOOTH EXTRACTION       Family History   Problem Relation Age of Onset    Cancer Mother     Hypertension Mother     Cancer Father      Social History     Tobacco Use    Smoking status: Every Day     Packs/day: .5     Types: Cigarettes    Smokeless tobacco: Never   Vaping Use    Vaping Use: Never used   Substance Use Topics    Alcohol use: Never    Drug use: Yes     Types: Amphetamines, Marijuana     Comment: last used meth smoked 1/16/24     Medications Prior to Admission   Medication Sig Dispense Refill Last Dose    albuterol sulfate  (90 Base) MCG/ACT inhaler Inhale 2 puffs Every 4 (Four) Hours As Needed for Wheezing.       aspirin 81 MG EC tablet Take 1 tablet by mouth Daily.       atorvastatin (LIPITOR) 10 MG tablet Take 1 tablet by mouth Every Night.       citalopram (CeleXA) 40 MG tablet Take 1 tablet by mouth Every Night.       diphenoxylate-atropine (LOMOTIL) 2.5-0.025 MG per tablet Take 1 tablet by mouth 2 (Two) Times a Day As Needed for Diarrhea.       fluticasone (FLONASE) 50 MCG/ACT nasal spray 1 spray into the nostril(s) as directed by provider Daily.       gabapentin  (NEURONTIN) 300 MG capsule Take 1 capsule by mouth 3 (Three) Times a Day.       meloxicam (MOBIC) 15 MG tablet Take 1 tablet by mouth Daily As Needed.       metoprolol succinate XL (TOPROL-XL) 25 MG 24 hr tablet Take 1 tablet by mouth Daily. 90 tablet 3     mirtazapine (REMERON) 15 MG tablet Take 1 tablet by mouth Every Night.       tamsulosin (FLOMAX) 0.4 MG capsule 24 hr capsule Take 1 capsule by mouth Every Night.       temazepam (RESTORIL) 15 MG capsule Take 1 capsule by mouth At Night As Needed for Sleep.       tiZANidine (ZANAFLEX) 4 MG tablet Take 1 tablet by mouth Every Night.        Allergies:  Pregabalin and Gluten meal      There is no immunization history on file for this patient.        REVIEW OF SYSTEMS:    Review of Systems   Constitutional: Positive for weight loss.   HENT: Negative.     Eyes: Negative.    Cardiovascular:  Positive for dyspnea on exertion, near-syncope and syncope. Negative for chest pain.   Respiratory:  Positive for shortness of breath.    Skin: Negative.    Musculoskeletal:  Positive for falls.   Gastrointestinal: Negative.    Genitourinary: Negative.    Neurological:  Positive for light-headedness.   Psychiatric/Behavioral: Negative.       Vital Signs  Temp:  [97.4 °F (36.3 °C)-98 °F (36.7 °C)] 98 °F (36.7 °C)  Heart Rate:  [68-80] 68  Resp:  [16] 16  BP: (110-132)/(44-93) 128/93          Physical Exam:  Physical Exam  Vitals reviewed.   Constitutional:       General: He is not in acute distress.     Appearance: Normal appearance. He is normal weight. He is not ill-appearing, toxic-appearing or diaphoretic.   HENT:      Head: Normocephalic.      Right Ear: External ear normal.      Left Ear: External ear normal.      Nose: Nose normal.      Mouth/Throat:      Mouth: Mucous membranes are moist.   Eyes:      Extraocular Movements: Extraocular movements intact.   Cardiovascular:      Rate and Rhythm: Normal rate and regular rhythm.      Pulses: Normal pulses.      Heart sounds:  Normal heart sounds.   Pulmonary:      Effort: Pulmonary effort is normal.      Breath sounds: Normal breath sounds.   Abdominal:      General: Bowel sounds are normal.      Palpations: Abdomen is soft.      Tenderness: There is no abdominal tenderness.   Musculoskeletal:         General: Normal range of motion.      Cervical back: Normal range of motion.      Right lower leg: No edema.      Left lower leg: No edema.   Skin:     General: Skin is warm and dry.      Capillary Refill: Capillary refill takes less than 2 seconds.   Neurological:      General: No focal deficit present.      Mental Status: He is alert and oriented to person, place, and time.   Psychiatric:         Mood and Affect: Mood normal.         Behavior: Behavior normal.         Thought Content: Thought content normal.         Judgment: Judgment normal.         Emotional Behavior:   Normal   Debilities:  None  Results Review:    I reviewed the patient's new clinical results.  Lab Results (most recent)       Procedure Component Value Units Date/Time    Urinalysis With Culture If Indicated - Urine, Clean Catch [[id number]]  (Normal) Collected: 01/17/24 1403    Specimen: Urine, Clean Catch Updated: 01/17/24 1412     Color, UA Yellow     Appearance, UA Clear     pH, UA 7.0     Specific Gravity, UA 1.007     Glucose, UA Negative     Ketones, UA Negative     Bilirubin, UA Negative     Blood, UA Negative     Protein, UA Negative     Leuk Esterase, UA Negative     Nitrite, UA Negative     Urobilinogen, UA 1.0 E.U./dL    Narrative:      In absence of clinical symptoms, the presence of pyuria, bacteria, and/or nitrites on the urinalysis result does not correlate with infection.  Urine microscopic not indicated.    Comprehensive Metabolic Panel [[id number]]  (Abnormal) Collected: 01/17/24 1201    Specimen: Blood Updated: 01/17/24 1225     Glucose 84 mg/dL      BUN 10 mg/dL      Creatinine 0.74 mg/dL      Sodium 135 mmol/L      Potassium 4.0 mmol/L      Comment:  Slight hemolysis detected by analyzer. Result may be falsely elevated.        Chloride 100 mmol/L      CO2 26.0 mmol/L      Calcium 8.2 mg/dL      Total Protein 6.4 g/dL      Albumin 3.9 g/dL      ALT (SGPT) 36 U/L      AST (SGOT) 34 U/L      Alkaline Phosphatase 200 U/L      Total Bilirubin 0.4 mg/dL      Globulin 2.5 gm/dL      A/G Ratio 1.6 g/dL      BUN/Creatinine Ratio 13.5     Anion Gap 9.0 mmol/L      eGFR 99.3 mL/min/1.73     Narrative:      GFR Normal >60  Chronic Kidney Disease <60  Kidney Failure <15      CBC & Differential [[id number]]  (Abnormal) Collected: 01/17/24 1201    Specimen: Blood Updated: 01/17/24 1209    Narrative:      The following orders were created for panel order CBC & Differential.  Procedure                               Abnormality         Status                     ---------                               -----------         ------                     CBC Auto Differential[[id number]]        Abnormal            Final result                 Please view results for these tests on the individual orders.    CBC Auto Differential [[id number]]  (Abnormal) Collected: 01/17/24 1201    Specimen: Blood Updated: 01/17/24 1209     WBC 4.10 10*3/mm3      RBC 3.14 10*6/mm3      Hemoglobin 9.8 g/dL      Hematocrit 30.7 %      MCV 97.8 fL      MCH 31.2 pg      MCHC 31.9 g/dL      RDW 24.9 %      RDW-SD 88.4 fl      MPV 10.8 fL      Platelets 140 10*3/mm3      Neutrophil % 67.7 %      Lymphocyte % 15.9 %      Monocyte % 14.2 %      Eosinophil % 1.9 %      Basophil % 0.3 %      Neutrophils, Absolute 2.80 10*3/mm3      Lymphocytes, Absolute 0.70 10*3/mm3      Monocytes, Absolute 0.60 10*3/mm3      Eosinophils, Absolute 0.10 10*3/mm3      Basophils, Absolute 0.00 10*3/mm3      nRBC 0.2 /100 WBC             Imaging Results (Most Recent)       Procedure Component Value Units Date/Time    CT Head Without Contrast [[id number]] Collected: 01/17/24 1244     Updated: 01/17/24 1330    Narrative:      CT HEAD WO  CONTRAST, CT CERVICAL SPINE WO CONTRAST    Date of Exam: 1/17/2024 12:33 PM EST    Indication: dizziness, fall.    Comparison: None available.    Technique: Axial CT images were obtained of the head without contrast administration.  Coronal reconstructions were performed.  Automated exposure control and iterative reconstruction methods were used.      Findings:  CT HEAD:  There is no evidence of acute infarction.    There is no acute intracranial hemorrhage. There are no extra-axial collections.    Ventricles and CSF spaces are symmetric. No mass effect nor hydrocephalus.    Brain parenchyma appears normal for age.     Paranasal sinuses are adequately aerated.    There is a left mastoid effusion with fluid also seen in the middle ear. On the right, the mastoid is sclerotic and postoperative changes are noted. There is opacification of the right middle ear cavity and ossicles are not visualized     Osseous structures and orbits appear intact.        CT CERVICAL SPINE:  OSSEOUS STRUCTURES: There is a curvilinear lucency through the right C1 posterior superior lateral mass.    Changes of ACDF are seen from C3-C6 with corpectomy of the C4 level. There is osseous fusion of C6-C7.    ALIGNMENT:  Normal    DISC SPACE: Mild disc space narrowing at C7-T1.    JOINTS: Mild bilateral C2-C3 facet arthropathy.    SOFT TISSUES: Partially calcified right thyroid nodule    LUNG APICES: Mild centrilobular emphysema is seen in the apices with scarring more prominent on the right.    There is a chronic fracture of the posterior right fourth rib.      Impression:      Impression:    Head:  1. No acute intracranial intracranial process.   2. Post-operative changes of the right mastoid/middle ear, correlate clinically.   3. Left mastoid/middle ear effusion.     Cervical spine:  1. Lucency of the right posterior superior C1 lateral mass, can not exclude a non-displaced acute fracture in the setting of trauma.  2. Changes of ACDF from C3  through C6.   3. Partially calcified thyroid nodule on the right, follow-up thyroid ultrasound is recommended.    These findings were called to [facility] on 1/17/2024 at 1:26 p.m. Eastern standard time        Electronically Signed: Linda Downey MD    1/17/2024 1:28 PM EST    Workstation ID: [id number]    CT Cervical Spine Without Contrast [[id number]] Collected: 01/17/24 1244     Updated: 01/17/24 1330    Narrative:      CT HEAD WO CONTRAST, CT CERVICAL SPINE WO CONTRAST    Date of Exam: 1/17/2024 12:33 PM EST    Indication: dizziness, fall.    Comparison: None available.    Technique: Axial CT images were obtained of the head without contrast administration.  Coronal reconstructions were performed.  Automated exposure control and iterative reconstruction methods were used.      Findings:  CT HEAD:  There is no evidence of acute infarction.    There is no acute intracranial hemorrhage. There are no extra-axial collections.    Ventricles and CSF spaces are symmetric. No mass effect nor hydrocephalus.    Brain parenchyma appears normal for age.     Paranasal sinuses are adequately aerated.    There is a left mastoid effusion with fluid also seen in the middle ear. On the right, the mastoid is sclerotic and postoperative changes are noted. There is opacification of the right middle ear cavity and ossicles are not visualized     Osseous structures and orbits appear intact.        CT CERVICAL SPINE:  OSSEOUS STRUCTURES: There is a curvilinear lucency through the right C1 posterior superior lateral mass.    Changes of ACDF are seen from C3-C6 with corpectomy of the C4 level. There is osseous fusion of C6-C7.    ALIGNMENT:  Normal    DISC SPACE: Mild disc space narrowing at C7-T1.    JOINTS: Mild bilateral C2-C3 facet arthropathy.    SOFT TISSUES: Partially calcified right thyroid nodule    LUNG APICES: Mild centrilobular emphysema is seen in the apices with scarring more prominent on the right.    There is a  chronic fracture of the posterior right fourth rib.      Impression:      Impression:    Head:  1. No acute intracranial intracranial process.   2. Post-operative changes of the right mastoid/middle ear, correlate clinically.   3. Left mastoid/middle ear effusion.     Cervical spine:  1. Lucency of the right posterior superior C1 lateral mass, can not exclude a non-displaced acute fracture in the setting of trauma.  2. Changes of ACDF from C3 through C6.   3. Partially calcified thyroid nodule on the right, follow-up thyroid ultrasound is recommended.    These findings were called to [facility] on 1/17/2024 at 1:26 p.m. Eastern standard time        Electronically Signed: Linda Downey MD    1/17/2024 1:28 PM EST    Workstation ID: [id number]    XR Chest 1 View [[id number]] Collected: 01/17/24 1225     Updated: 01/17/24 1229    Narrative:      XR CHEST 1 VW    Date of Exam: 1/17/2024 12:15 PM EST    Indication: dizziness    Comparison: March 19, 2021    Findings:  The patient's chin obscures the apical areas. The heart is not enlarged. There is no venessa consolidation or obvious pleural effusions. There is acute fracture involving the more posterolateral aspect of the right seventh and possibly eighth ribs.      Impression:      Impression:  1.Acute right-sided rib fractures are suggested.      Electronically Signed: Reuben Young MD    1/17/2024 12:27 PM EST    Workstation ID: [id number]          reviewed    ECG/EMG Results (most recent)       Procedure Component Value Units Date/Time    Adult Transthoracic Echo Complete W/ Cont if Necessary Per Protocol [[id number]] Resulted: 01/17/24 1538     Updated: 01/17/24 1540     LVIDd 4.6 cm      LVIDs 3.4 cm      IVSd 1.00 cm      LVPWd 1.00 cm      FS 26.1 %      IVS/LVPW 1.00 cm      ESV(cubed) 39.3 ml      LV Sys Vol (BSA corrected) 29.6 cm2      EDV(cubed) 97.3 ml      LV Remy Vol (BSA corrected) 53.3 cm2      LV mass(C)d 158.8 grams      LVOT area 3.1 cm2       LVOT diam 2.00 cm      EDV(MOD-sp4) 100.0 ml      ESV(MOD-sp4) 55.5 ml      SV(MOD-sp4) 44.5 ml      SI(MOD-sp4) 23.7 ml/m2      EF(MOD-sp4) 44.5 %      MV E max gucci 50.3 cm/sec      MV A max gucci 84.9 cm/sec      MV dec time 0.17 sec      MV E/A 0.59     Med Peak E' Gucci 6.7 cm/sec      Lat Peak E' Gucci 8.5 cm/sec      TR max gucci 242.0 cm/sec      Avg E/e' ratio 6.62     SV(LVOT) 48.7 ml      TAPSE (>1.6) 1.74 cm      RV S' 10.9 cm/sec      LA dimension (2D)  3.5 cm      LV V1 max 67.1 cm/sec      LV V1 max PG 1.80 mmHg      LV V1 mean PG 1.00 mmHg      LV V1 VTI 15.5 cm      Ao pk gucci 110.0 cm/sec      Ao max PG 4.8 mmHg      Ao mean PG 3.0 mmHg      Ao V2 VTI 23.4 cm      LORI(I,D) 2.08 cm2      MV max PG 4.2 mmHg      MV mean PG 2.00 mmHg      MV V2 VTI 21.4 cm      MV P1/2t 67.3 msec      MVA(P1/2t) 3.3 cm2      MVA(VTI) 2.28 cm2      MV dec slope 366.0 cm/sec2      TR max PG 23.4 mmHg      RV V1 max PG 1.21 mmHg      RV V1 max 55.1 cm/sec      RV V1 VTI 11.9 cm      PA V2 max 88.4 cm/sec      PA acc time 0.14 sec      ACS 2.00 cm      Sinus 3.5 cm      STJ 3.2 cm     Narrative:        Left ventricular ejection fraction appears to be 56 - 60%.      ECG 12 Lead Other; dizziness [[id number]] Collected: 01/17/24 1140     Updated: 01/18/24 0633     QT Interval 430 ms      QTC Interval 449 ms     Narrative:      HEART RATE= 66  bpm  RR Interval= 916  ms  NV Interval= 137  ms  P Horizontal Axis= -27  deg  P Front Axis= 138  deg  QRSD Interval= 91  ms  QT Interval= 430  ms  QTcB= 449  ms  QRS Axis= 72  deg  T Wave Axis= 87  deg  - ABNORMAL ECG -  Sinus or ectopic atrial rhythm  Atrial premature complex  Probable left atrial enlargement  Nonspecific T abnormalities, lateral leads  Electronically Signed By: Jaylen Rashid (FABIANA) 18-Jan-2024 06:33:37  Date and Time of Study: 2024-01-17 11:40:23          reviewed        Results for orders placed during the hospital encounter of 01/17/24    Adult Transthoracic Echo Complete W/  Cont if Necessary Per Protocol    Interpretation Summary    Left ventricular ejection fraction appears to be 56 - 60%.      Microbiology Results (last 10 days)       ** No results found for the last 240 hours. **            Assessment & Plan     Frequent falls       Frequent falls with possible nondisplaced fracture of C1  -Hemoglobin: 9.8 with a slightly increased MCV, monitor  -UA unremarkable  -Chest x-ray showed acute right-sided rib fracture  -CT of head showed no acute intracranial process with postoperative changes of right mastoid/middle ear as well as left mastoid middle ear effusion  -CT of C-spine showed lucency of the posterior superior C1 lateral mass with radiologist noting they could not exclude a nondisplaced acute fracture in the setting of trauma.  Changes of ACDF from C3-C6 were noted as well as partially calcified thyroid nodule with recommended for thyroid ultrasound  -EKG showed sinus rhythm at 66 with PACs present and some baseline artifact without obvious acute changes and a QTc of 449 ms  -Rigid cervical collar applied in the ED, continue  -Neurosurgery consulted in ED who recommended continuing cervical collar and outpatient follow-up in 8 weeks  -TSH: 2.370, hemoglobin A1c: 5.50, lipid panel showed total cholesterol of 76 with an LDL of 31 and HDL of 32  -Iron profile showed an iron level of 33 with ferritin of 18.3  -Incentive spirometry ordered  -Toradol  -Fall precautions  -PT/OT consulted  -Echocardiogram  -Telemetry  -Neurosurgery ordered future outpatient CT for continued monitoring    Anemia  Lab Results   Component Value Date    HGB 9.5 (L) 01/18/2024    MCV 99.5 (H) 01/18/2024    MCHC 32.4 01/18/2024   -Iron level: 33 with a TSAT of 7  -125 mg Ferrlecit ordered x 1  -Start p.o. iron at discharge     History of CAD/hypertension      BP Readings from Last 1 Encounters:   01/17/24 128/73   - Continue aspirin, statin and metoprolol  - Monitor while admitted     COPD  -Albuterol      Depression/anxiety  -Continue Celexa, Remeron and temazepam     Hyperlipidemia  -Statin    I discussed the patients findings and my recommendations with patient and nursing staff.     Discharge Diagnosis:      Frequent falls      Hospital Course  Patient is a 68 y.o. male presented with multiple falls with lightheadedness and possible syncope and an HPI noted above.  Hemoglobins initially found to be low at 9.8 slightly increased MCV.  UA was obtained and found to be unremarkable.  Chest x-ray showed acute right-sided rib fracture and CT of head showed no acute intracranial process with postoperative changes of the right mastoid/middle ear as well as left mastoid middle ear effusion.  CT of C-spine ordered in the ED showed lucency of the posterior superior C1 lateral mass with radiologist noting they could not exclude a nondisplaced acute fracture in the setting of trauma.  Changes of ACDF from C3-C6 were noted as well as partially calcified thyroid nodule were also noted with recommended for thyroid ultrasound.  EKG showed sinus rhythm at 66 with PACs.  Rigid cervical collar was applied in the ED and neurosurgery was consulted who evaluated patient recommending continued cervical collar placement at all times and outpatient follow-up in 8 weeks.  TSH was assessed and found to be 2.370.  A1c 5.50, lipid panel showed total cholesterol of 76 with an LDL of 31 and HDL of 32, B12 found to be less than 150.  Iron profile showed an iron level of 33 with a TSAT of 7 and IV Ferrlecit was ordered x 1.  He will be started on B12 supplementation as well as p.o. iron with instructions to follow-up with PCP for continued monitoring.  Patient had recent visit with cardiologist who had ordered outpatient echocardiogram although given his symptoms and recent falls this was ordered while he was admitted showed an EF of 56-60%.  PT/OT were consulted with recommendations for home health occupational therapy and rolling walker which  be ordered at discharge.  On the morning after presentation patient reported that he does use methamphetamine with some frequency and that his falls may be related to this use.  Urine drug screen was ordered and found to be positive for amphetamines.   evaluated patient and discussed options for cessation assistance with referrals made to [facility] for assistance with home health as well as Medicaid enrollment.  Discussed complete case and findings with patient who reports he feels ready for discharge and able to manage at home.  At this time patient is felt to be in good condition for discharge with close follow-up with his PCP as well as cardiology on an outpatient basis.  His full testing/results and plan were discussed with patient along with concerning/alarm symptoms for which to call 911/return to the ED..  All questions were answered he verbalizes his understanding and agreement.    Past Medical History:     Past Medical History:   Diagnosis Date    CAD (coronary artery disease)     COPD (chronic obstructive pulmonary disease)     Depression     Depression     Dizziness     Falls     Hyperlipidemia     Hypertension     Low back pain        Past Surgical History:     Past Surgical History:   Procedure Laterality Date    CARDIAC CATHETERIZATION N/A 3/22/2021    Procedure: Left Heart Cath with angiogram;  Surgeon: Mack French MD;  Location: [facility] CATH INVASIVE LOCATION;  Service: Cardiovascular;  Laterality: N/A;    CARDIAC CATHETERIZATION N/A 3/22/2021    Procedure: Left ventriculography;  Surgeon: Mack French MD;  Location: [facility] CATH INVASIVE LOCATION;  Service: Cardiovascular;  Laterality: N/A;    CERVICAL SPINE SURGERY      INNER EAR SURGERY      WISDOM TOOTH EXTRACTION         Social History:   Social History     Socioeconomic History    Marital status: Single   Tobacco Use    Smoking status: Every Day     Packs/day: .5     Types: Cigarettes    Smokeless tobacco: Never   Vaping Use     Vaping Use: Never used   Substance and Sexual Activity    Alcohol use: Never    Drug use: Yes     Types: Amphetamines, Marijuana     Comment: last used meth smoked 1/16/24    Sexual activity: Defer       Procedures Performed         Consults:   Consults       Date and Time Order Name Status Description    1/17/2024  1:31 PM Neurosurgery (on-call MD unless specified) Completed             Condition on Discharge:     Stable    Discharge Disposition      Discharge Medications     Discharge Medications        ASK your doctor about these medications        Instructions Start Date   albuterol sulfate  (90 Base) MCG/ACT inhaler  Commonly known as: PROVENTIL HFA;VENTOLIN HFA;PROAIR HFA   2 puffs, Inhalation, Every 4 Hours PRN      aspirin 81 MG EC tablet   81 mg, Oral, Daily      atorvastatin 10 MG tablet  Commonly known as: LIPITOR   10 mg, Oral, Nightly      citalopram 40 MG tablet  Commonly known as: CeleXA   40 mg, Oral, Nightly      diphenoxylate-atropine 2.5-0.025 MG per tablet  Commonly known as: LOMOTIL   1 tablet, Oral, 2 Times Daily PRN      fluticasone 50 MCG/ACT nasal spray  Commonly known as: FLONASE   1 spray, Nasal, Daily      gabapentin 300 MG capsule  Commonly known as: NEURONTIN   300 mg, Oral, 3 Times Daily      meloxicam 15 MG tablet  Commonly known as: MOBIC   15 mg, Oral, Daily PRN      metoprolol succinate XL 25 MG 24 hr tablet  Commonly known as: TOPROL-XL   25 mg, Oral, Daily      mirtazapine 15 MG tablet  Commonly known as: REMERON   15 mg, Oral, Nightly      tamsulosin 0.4 MG capsule 24 hr capsule  Commonly known as: FLOMAX   1 capsule, Oral, Nightly      temazepam 15 MG capsule  Commonly known as: RESTORIL   15 mg, Oral, Nightly PRN      tiZANidine 4 MG tablet  Commonly known as: ZANAFLEX   4 mg, Oral, Nightly               Discharge Diet:     Activity at Discharge:     Follow-up Appointments  Future Appointments   Date Time Provider Department Center   2/2/2024  1:15 PM [facility]   7/24/2024  2:00 PM Mack French MD [facility]     Additional Instructions for the Follow-ups that You Need to Schedule       CT Cervical Spine Without Contrast   Feb 28, 2024      Release to patient: Routine Release                Test Results Pending at Discharge  Pending Labs       Order Current Status    Vitamin B12 In process             Risk for Readmission (LACE) Score: 1 (1/18/2024  6:00 AM)      Greater than 30 minutes spent in discharge activities for this patient    Electronically signed by Pedro Blum PA-C, 01/18/24, 2:55 PM EST.

## 2024-01-19 NOTE — CASE MANAGEMENT/SOCIAL WORK
Case Management Discharge Note      Final Note: Routine home         Selected Continued Care - Discharged on 1/18/2024 Admission date: 1/17/2024 - Discharge disposition: Home or Self Care        Durable Medical Equipment Coordination complete.      Service Provider Selected Services Address Phone Fax Patient Preferred    [facility] [address] [facility] [address] [address] [phone] --                    Transportation Services  Private: Car    Final Discharge Disposition Code: 01 - home or self-care

## 2024-02-05 ENCOUNTER — HOSPITAL ENCOUNTER (EMERGENCY)
Facility: HOSPITAL | Age: 69
Discharge: HOME | End: 2024-02-05
Attending: EMERGENCY MEDICINE | Admitting: EMERGENCY MEDICINE
Payer: MEDICARE

## 2024-02-05 ENCOUNTER — APPOINTMENT (OUTPATIENT)
Dept: GENERAL RADIOLOGY | Facility: HOSPITAL | Age: 69
End: 2024-02-05
Payer: MEDICARE

## 2024-02-05 VITALS
HEIGHT: 71 IN | OXYGEN SATURATION: 100 % | TEMPERATURE: 98.2 F | DIASTOLIC BLOOD PRESSURE: 82 MMHG | SYSTOLIC BLOOD PRESSURE: 112 MMHG | HEART RATE: 76 BPM | RESPIRATION RATE: 18 BRPM | WEIGHT: 155 LBS | BODY MASS INDEX: 21.7 KG/M2

## 2024-02-05 DIAGNOSIS — R06.00 DYSPNEA, UNSPECIFIED TYPE: Primary | ICD-10-CM

## 2024-02-05 LAB
HOLD SPECIMEN: NORMAL
WHOLE BLOOD HOLD COAG: NORMAL
WHOLE BLOOD HOLD SPECIMEN: NORMAL

## 2024-02-05 PROCEDURE — 71045 X-RAY EXAM CHEST 1 VIEW: CPT

## 2024-02-05 PROCEDURE — 93005 ELECTROCARDIOGRAM TRACING: CPT | Performed by: EMERGENCY MEDICINE

## 2024-02-05 PROCEDURE — 93005 ELECTROCARDIOGRAM TRACING: CPT

## 2024-02-05 PROCEDURE — 99283 EMERGENCY DEPT VISIT LOW MDM: CPT

## 2024-02-05 NOTE — DISCHARGE PLACEMENT REQUEST
"AddisonDoni cortés (68 y.o. Male)       Date of Birth   1955    Social Security Number       Address   [address] [place] [address] [place] [facility]    Home Phone   [phone]    MRN   [id number]       Rastafarian   Hoahaoism    Marital Status   Single                            Admission Date   2/5/24    Admission Type   Emergency    Admitting Provider       Attending Provider   Amanuel Quispe MD    Department, Room/Bed   [facility] EMERGENCY DEPARTMENT, 11/11       Discharge Date       Discharge Disposition       Discharge Destination                                 Attending Provider: Amanuel Quispe MD    Allergies: Pregabalin, Gluten Meal    Isolation: None   Infection: None   Code Status: Prior    Ht: 180.3 cm (71\")   Wt: 70.3 kg (155 lb)    Admission Cmt: None   Principal Problem: None                  Active Insurance as of 2/5/2024       Primary Coverage       Payor Plan Insurance Group Employer/Plan Group    ANTHEM MEDICARE REPLACEMENT ANTHEM MEDICARE ADVANTAGE [id number]       Payor Plan Address Payor Plan Phone Number Payor Plan Fax Number Effective Dates    [address] [phone]  1/1/2022 - None Entered    [facility] [address]         Subscriber Name Subscriber Birth Date Member ID       DONI JAIME 1955 [id number]                     Emergency Contacts        (Rel.) Home Phone Work Phone Mobile Phone    Nahum Torrez (Friend) -- -- [phone]    ADDISONMARI (Son) -- -- [phone]                "

## 2024-02-05 NOTE — ED PROVIDER NOTES
Subjective   History of Present Illness  Patient is a 68-year-old male who was sent in by caretaker due to shortness of breath.  He has no complaints at this time.  Denies chest pain cough fever shortness of breath or other complaint      Review of Systems    Past Medical History:   Diagnosis Date    CAD (coronary artery disease)     COPD (chronic obstructive pulmonary disease)     Depression     Depression     Dizziness     Falls     Hyperlipidemia     Hypertension     Low back pain        Allergies   Allergen Reactions    Pregabalin Swelling    Gluten Meal GI Intolerance       Past Surgical History:   Procedure Laterality Date    CARDIAC CATHETERIZATION N/A 3/22/2021    Procedure: Left Heart Cath with angiogram;  Surgeon: Mack French MD;  Location: [facility] CATH INVASIVE LOCATION;  Service: Cardiovascular;  Laterality: N/A;    CARDIAC CATHETERIZATION N/A 3/22/2021    Procedure: Left ventriculography;  Surgeon: Mack French MD;  Location: [facility] CATH INVASIVE LOCATION;  Service: Cardiovascular;  Laterality: N/A;    CERVICAL SPINE SURGERY      INNER EAR SURGERY      WISDOM TOOTH EXTRACTION         Family History   Problem Relation Age of Onset    Cancer Mother     Hypertension Mother     Cancer Father        Social History     Socioeconomic History    Marital status: Single   Tobacco Use    Smoking status: Every Day     Packs/day: .5     Types: Cigarettes    Smokeless tobacco: Never   Vaping Use    Vaping Use: Never used   Substance and Sexual Activity    Alcohol use: Never    Drug use: Yes     Types: Amphetamines, Marijuana     Comment: last used meth smoked 1/16/24    Sexual activity: Defer           Objective   Physical Exam  Neck has no adenopathy JVD or bruits.  Lungs are clear.  Heart has a regular rate rhythm without murmur rub or gallop.  Chest is nontender.  Abdomen is soft nontender.  Extremity exam is unremarkable.  Procedures       My EKG interpretation shows normal sinus rhythm at a rate of 74 with  no acute ST change    ED Course      Results for orders placed or performed during the hospital encounter of 02/05/24   ECG 12 Lead Dyspnea   Result Value Ref Range    QT Interval 392 ms    QTC Interval 456 ms   Green Top (Gel)   Result Value Ref Range    Extra Tube Hold for add-ons.    Lavender Top   Result Value Ref Range    Extra Tube hold for add-on    Light Blue Top   Result Value Ref Range    Extra Tube Hold for add-ons.      XR Chest 1 View    Result Date: 2/5/2024  Impression: Mild vascular congestion.. Electronically Signed: Macario Mcclain MD  2/5/2024 4:18 PM EST  Workstation ID: [id number]                                          Medical Decision Making  Chest x-ray shows no cardiomegaly fusion or infiltrate.  Patient has no EKG changes.  Reexamination patient has no complaints with a benign exam and normal vital signs.  Will be discharged.  He will have home health start visitations.  Family will follow with her family physician.    Amount and/or Complexity of Data Reviewed  Radiology: ordered and independent interpretation performed.  ECG/medicine tests: ordered and independent interpretation performed.        Final diagnoses:   Dyspnea, unspecified type       ED Disposition  ED Disposition       ED Disposition   Discharge    Condition   Stable    Comment   --               [facility] [place]  [address] [place] [address]  [phone]             Medication List      No changes were made to your prescriptions during this visit.            Amanuel Quispe MD  02/05/24 9120

## 2024-02-05 NOTE — CASE MANAGEMENT/SOCIAL WORK
Continued Stay Note   Mika     Patient Name: Jersey Celaya  MRN: [id number]  Today's Date: 2/5/2024    Admit Date: 2/5/2024    Plan: Home with [facility]        Row Name 02/05/24 1612       Plan    Plan Home with [facility]    Plan Comments patient very hard of hearing. Family was wanting SNF placement from ER do to falls at home and family thought he needed therapy as he also had a recent stay at another hospital. Explained that we cannot admit just for placement and his insurance requires a Precert agreeable to Home health referrals Declined by the following do to staffing: KANDACE, Ken, BF declined do to staffing . [facility] able to accept per liaison arianne.                   Discharge Codes    No documentation.                       Amanda Espana RN

## 2024-02-05 NOTE — ED NOTES
Just updated son- he is on his way, approx 30 mins out for ETA. Case management was able to set up home health through [facility]. Updated son who was very grateful

## 2024-02-06 LAB
QT INTERVAL: 392 MS
QTC INTERVAL: 456 MS

## 2024-02-12 NOTE — Clinical Note
Level of Care: Critical Care [6]   Admitting Physician: ARCELIA NIEVES [[id number]]   Attending Physician: ARCELIA NIEVES [[id number]]

## 2024-02-13 PROBLEM — R41.82 AMS (ALTERED MENTAL STATUS): Status: ACTIVE | Noted: 2024-01-01

## 2024-02-13 PROBLEM — E43 SEVERE MALNUTRITION: Status: ACTIVE | Noted: 2024-01-01

## 2024-02-13 NOTE — CONSULTS
Neurosurgery Consultation      Patient: Jersey Celaya    Sex: male    YOB: 1955    Date of Admission: 2/12/2024  6:40 PM    Admitting Dx: Fall, initial encounter [W19.XXXA]  Traumatic rhabdomyolysis, initial encounter [T79.6XXA]  Altered mental status, unspecified altered mental status type [R41.82]  AMS (altered mental status) [R41.82]    Reason for Consult: Intracranial neoplasm      Subjective     CC: Altered mental status    HPI:  Patient is a 68 y.o. male who presented to the ED yesterday with altered mental status and reports of a recent fall.  Unknown last known normal.  Notably, patient was evaluated by neurosurgery in the hospital 3 to 4 weeks ago for a fall, syncope, and C1 fracture.    During my evaluation patient is lying in bed in no acute distress. He does seem to localize to painful stimuli.  Opens his eyes to painful stimuli.  He does not converse, and has minimal verbal responsiveness.  He does move all his extremities spontaneously but not to command.      PMH:  Past Medical History:   Diagnosis Date    CAD (coronary artery disease)     COPD (chronic obstructive pulmonary disease)     Depression     Depression     Dizziness     Falls     Hyperlipidemia     Hypertension     Low back pain          Current Facility-Administered Medications:     sennosides-docusate (PERICOLACE) 8.6-50 MG per tablet 2 tablet, 2 tablet, Oral, BID **AND** polyethylene glycol (MIRALAX) packet 17 g, 17 g, Oral, Daily PRN **AND** bisacodyl (DULCOLAX) EC tablet 5 mg, 5 mg, Oral, Daily PRN **AND** bisacodyl (DULCOLAX) suppository 10 mg, 10 mg, Rectal, Daily PRN, Francheska Anders APRN    dexAMETHasone (DECADRON) injection 4 mg, 4 mg, Intravenous, Q6H, Francheska Anders APRN, 4 mg at 02/13/24 0306    hydrALAZINE (APRESOLINE) injection 10 mg, 10 mg, Intravenous, Q6H PRN, Francheska Anders APRN    levETIRAcetam (KEPPRA) injection 500 mg, 500 mg, Intravenous, Q12H, Francheska Anders APRN     nitroglycerin (NITROSTAT) SL tablet 0.4 mg, 0.4 mg, Sublingual, Q5 Min PRN, Francheska Anders APRN    ondansetron ODT (ZOFRAN-ODT) disintegrating tablet 4 mg, 4 mg, Oral, Q6H PRN **OR** ondansetron (ZOFRAN) injection 4 mg, 4 mg, Intravenous, Q6H PRN, Francheska Anders APRN    sodium chloride 0.9 % flush 10 mL, 10 mL, Intravenous, PRN, Yariel Mcclain MD    [COMPLETED] Insert Peripheral IV, , , Once **AND** sodium chloride 0.9 % flush 10 mL, 10 mL, Intravenous, PRN, Odette Harden, ATIF    sodium chloride 0.9 % flush 10 mL, 10 mL, Intravenous, Q12H, Francheska Anders, APRN, 10 mL at 02/13/24 0051    sodium chloride 0.9 % flush 10 mL, 10 mL, Intravenous, PRN, Francheska Anders, APRN    sodium chloride 0.9 % infusion 40 mL, 40 mL, Intravenous, PRN, Francheska Anders, APRN    sodium chloride 0.9 % infusion, 50 mL/hr, Intravenous, Continuous, Francheska Anders APRN, Last Rate: 50 mL/hr at 02/13/24 0431, 50 mL/hr at 02/13/24 0431    Allergies   Allergen Reactions    Pregabalin Swelling    Gluten Meal GI Intolerance       Past Surgical History:   Procedure Laterality Date    CARDIAC CATHETERIZATION N/A 3/22/2021    Procedure: Left Heart Cath with angiogram;  Surgeon: Mack French MD;  Location: [facility] CATH INVASIVE LOCATION;  Service: Cardiovascular;  Laterality: N/A;    CARDIAC CATHETERIZATION N/A 3/22/2021    Procedure: Left ventriculography;  Surgeon: Mack French MD;  Location: [facility] CATH INVASIVE LOCATION;  Service: Cardiovascular;  Laterality: N/A;    CERVICAL SPINE SURGERY      INNER EAR SURGERY      WISDOM TOOTH EXTRACTION         Social History     Socioeconomic History    Marital status: Single   Tobacco Use    Smoking status: Every Day     Packs/day: .5     Types: Cigarettes    Smokeless tobacco: Never   Vaping Use    Vaping Use: Never used   Substance and Sexual Activity    Alcohol use: Never    Drug use: Yes     Types: Amphetamines, Marijuana     Comment: last used  meth smoked 1/16/24    Sexual activity: Defer       Family History   Problem Relation Age of Onset    Cancer Mother     Hypertension Mother     Cancer Father          Current Medications:  Scheduled Meds:dexAMETHasone, 4 mg, Intravenous, Q6H  levETIRAcetam, 500 mg, Intravenous, Q12H  senna-docusate sodium, 2 tablet, Oral, BID  sodium chloride, 10 mL, Intravenous, Q12H      Continuous Infusions:sodium chloride, 50 mL/hr, Last Rate: 50 mL/hr (02/13/24 0431)      PRN Meds:   senna-docusate sodium **AND** polyethylene glycol **AND** bisacodyl **AND** bisacodyl    hydrALAZINE    nitroglycerin    ondansetron ODT **OR** ondansetron    sodium chloride    [COMPLETED] Insert Peripheral IV **AND** sodium chloride    sodium chloride    sodium chloride    ROS: 14 point review of systems was completed and is negative except for what is noted in HPI      Objective     Vitals:    02/13/24 0430 02/13/24 0500 02/13/24 0600 02/13/24 0800   BP: 128/76 135/55 124/71    BP Location: Left arm      Patient Position: Lying      Pulse: 94 51 55    Resp: 15      Temp: 98.1 °F (36.7 °C)   97 °F (36.1 °C)   TempSrc: Oral   Axillary   SpO2: 100% 99% 100%    Weight:       Height:           Physical exam  General  - Awake but drowsy  - In no acute distress  HEENT  - Normocephalic, atraumatic  Cardiac  - RRR, no peripheral edema  Respiratory  - Normal respiratory rate and effort      NEUROLOGIC    MENTAL STATUS:  - GCS: 9 (2-2-5)  CRANIAL NERVES:  II:      Pupils 3 mm equal and reactive  III, IV, VI: EOM intact, no gaze preference or deviation, no nystagmus  SENSORIMOTOR:  - Localizes to painful stimuli in all 4 extremities  - Seems to have good strength              RESULTS REVIEW:  Results from last 7 days   Lab Units 02/13/24 0433 02/12/24 2001 02/12/24  1859   WBC 10*3/mm3 9.90  --  12.30*   HEMOGLOBIN g/dL 10.5*  --  10.5*   HEMOGLOBIN, POC g/dL  --  10.9*  --    HEMATOCRIT % 31.9*  --  33.8*   HEMATOCRIT POC %  --  32*  --    PLATELETS  10*3/mm3 182  --  217        Results from last 7 days   Lab Units 02/13/24  0433 02/12/24 2011 02/12/24 2001   SODIUM mmol/L 132* 132*  --    POTASSIUM mmol/L 4.3 4.1  --    CHLORIDE mmol/L 95* 98  --    CO2 mmol/L 20.0* 17.0*  --    BUN mg/dL 29* 30*  --    CREATININE mg/dL 0.72* 0.80 0.86   CALCIUM mg/dL 8.8 8.7  --    BILIRUBIN mg/dL 0.8 0.9  --    ALK PHOS U/L 199* 213*  --    ALT (SGPT) U/L 62* 50*  --    AST (SGOT) U/L 161* 132*  --    GLUCOSE mg/dL 121* 103*  --            CT Cervical Spine Without Contrast    Result Date: 2/12/2024  CT CERVICAL SPINE WO CONTRAST Date of Exam: 2/12/2024 7:10 PM EST Indication: fall. Comparison: CT of the cervical spine performed on January 17, 2024 Technique: Axial CT images were obtained of the cervical spine without contrast administration.  Sagittal and coronal reconstructions were performed.  Automated exposure control and iterative reconstruction methods were used. Findings: No evidence of acute fracture or traumatic subluxation.  No evidence of aggressive lesions. The prevertebral soft tissues appear unremarkable.Surrounding soft tissues appear within normal limits. Again noted are postsurgical changes of ACDF from C3-C6. Patient is post C4 corpectomy. Osseous fusion at C6-C7 is again visualized. No CT evidence of high-grade canal stenosis. Streak artifact from hardware limits evaluation. The lung apices appear clear. Partially calcified right thyroid lobe nodule is again visualized. Correlate with ultrasound if not already performed.     Impression: Impression: No evidence of acute fracture or traumatic subluxation of the cervical spine. Postsurgical changes from C3-C6. Stable partially calcified right thyroid lobe nodule. Correlate with ultrasound if not already performed. Electronically Signed: Emil Norwood MD  2/12/2024 7:42 PM EST  Workstation ID: [id number]      CT Head Without Contrast    Result Date: 2/12/2024  CT HEAD WO CONTRAST Date of Exam: 2/12/2024 7:10  PM EST Indication: fall. Comparison: January 17, 2024 Technique: Axial CT images were obtained of the head without contrast administration.  Coronal reconstructions were performed.  Automated exposure control and iterative reconstruction methods were used. Findings: There is hypoattenuation with mass effect in the posterior left periventricular white matter in the area of the left posterior lateral ventricle. There is effacement of the posterior left lateral ventricle. Within the area of hypoattenuation there appears to be an ill-defined mass estimated to measure up to 3-4 cm in diameter. There is some peripheral hyperdense attenuation. These findings represent an interval change. There is effacement of posterior left sulci. There is estimated to be approximately 2-3 mm rightward midline shift at the level of the foramen of Garcia. No definite extra-axial collection is seen. No other definite mass or significant edema. No significant hemorrhage is seen. The basal cisterns appear patent. There is opacification of the mastoid air cells and middle ear spaces, as before. Right ossicles are not visualized. There is hyperdense attenuation in the area of the right external auditory canal and posteriorly along the right mastoid. There is partial opacification and mucosal thickening of the ethmoid sinuses. Trace air-fluid levels are seen in the right frontal, sphenoid, and maxillary sinuses. No definite acute calvarial abnormality. There is a posterior right scalp contusion/hematoma. There also appears to be a small left frontal scalp contusion.     Impression: Impression: 1.Findings suspicious for a mass in the posterior left periventricular white matter with surrounding edema, and mass effect. There is effacement of the posterior left lateral ventricle, effacement of the left posterior cerebral sulci, and 2-3 mm rightward midline shift. Finding is indeterminate but concerning for neoplasm or abscess. MRI with and without  contrast is recommended for further evaluation. 2.There appears to be slight peripheral hyperdensity of the mass, and a component of hemorrhage cannot be excluded. This does not appear typical of an acute intraparenchymal hemorrhage, and no other hemorrhage is seen at this time. 3.Opacification of the right mastoid air cells and middle ear spaces with absence of the ossicles, as before. There is also hyperdense attenuation in the area of the right external auditory canal and posteriorly along the right mastoid. Findings could be  related to chronic otomastoiditis with possible postoperative changes on the right. 4.Paranasal sinus mucosal thickening with trace air-fluid levels which could indicate acute sinusitis. Results were called to the ordering provider by Dr. Louie at 2/12/2024 7:46 PM EST. Electronically Signed: Quincy Louie  2/12/2024 7:57 PM EST  Workstation ID: [id number]   MRI Brain With & Without Contrast    Result Date: 2/13/2024  MRI BRAIN W WO CONTRAST Date of Exam: 2/13/2024 1:01 AM EST Indication: Head trauma, skull fracture or hematoma (Age 18-64y).  Comparison: CT head 2/12/2024. Technique:  Routine multiplanar/multisequence sequence images of the brain were obtained before and after the uneventful administration of Prohance. Findings: There is redemonstration of a large mass that appears centered within the left parietal white matter. The mass involves the splenium of the corpus callosum extensively and extends into the posterior body and crosses the midline into the right parietal periventricular white matter. The solid discrete components of the mass measure up to 7.8 x 5.9 cm in the axial plane. There is also a large region of surrounding T2/FLAIR hyperintense signal that is nearly confluent in the periventricular white matter and extends into the body and genu of the corpus callosum, as well as the left frontal periventricular white matter and into the posterior aspects of both basal ganglia.  Abnormal signal infiltrates the bilateral parietal and occipital white matter. The mass demonstrates an irregular discontinuous rim of enhancement and also exhibits some diffusion restriction suggesting necrosis and high cellularity. The mass also exhibits extensive susceptibility artifact suggesting tumoral hemorrhage. There is significant mass effect in both parietal lobes with sulcal effacement on the left. There is near complete effacement of the posterior aspect of the left lateral ventricle and partial effacement of the right lateral ventricle. There is some enlargement of  the left temporal horn, which could suggest a trapped component. The third and fourth ventricles appear normal size. There is approximately 4 mm rightward shift of midline structures due to mass effect. There is no evidence of an acute infarct. No herniation. There appears to be postoperative change at the right mastoid. The left mastoid is underpneumatized. There is mild paranasal sinus mucosal disease. There is scalp edema most pronounced at the right parieto-occipital region. The orbits appear unremarkable. Cerebellum is within normal limits. There are cervical degenerative and postoperative findings.     Impression: Impression: 1.Large mass centered in the left parietal white matter and extensively involving the corpus callosum crossing to the right parietal white matter with extensive surrounding signal abnormality concerning for infiltrating tumor. This is most consistent with a high-grade glioma. 2.There is extensive intratumoral susceptibility artifact suggesting hemorrhagic component. 3.There is significant mass effect with sulcal effacement on the left and partial effacement of the right lateral ventricle. There is some enlargement of the left temporal horn, which could suggest a trapped component. 4.There is 4 mm rightward shift of midline structures due to mass effect. No herniation. 5.No evidence of acute infarct or acute  intracranial hemorrhage. Electronically Signed: Dieter Quinones MD  2/13/2024 1:30 AM EST  Workstation ID: [id number]         Assessment     MDM: This is a 68 y.o. male currently in the hospital with altered mental status, rhabdomyolysis, and radiographic evidence of an intracranial mass.  Neuroexam is nonfocal.  Altered mental status likely having contributions from rhabdomyolysis as well as intracranial mass.    Imaging shows a large mass throughout the left parietal white matter crossing the corpus callosum into the right hemisphere.  Still mostly relegated to the more midline tissue on the right.  He has a large amount of mass effect and midline shift but no overt herniation.  Findings are most consistent with high-grade glioma, though lymphoma would also be in the differential.    No urgent/emergent neurosurgical intervention indicated at this time, particularly with a lesion crossing midline.  This is most likely a GBM, however I believe it is worth continuing steroid treatment as well as treating his rhabdo and assess his clinical and radiographic response to treatment.  Would like to get a repeat MRI on Friday to see if anything is changed secondary to steroid therapy which would indicate stronger possibility of Lymphoma.        Plan     Intracranial neoplasm  - Continue IV Decadron 4 mg every 6 hours  - Continue Keppra  - Sodium goal: 145-150  - Every 4 hour neurochecks  - Repeat MRI brain with and without contrast on Friday 2/16  - Hematology oncology consult    Rhabdomyolysis  - Medical management per primary team     3.   C1 fracture  - Continue hard cervical collar at all times    I discussed my assessment and recommendations with Dr. Manuel and the ICU nursing staff      Satinder Matos PA-C  2/13/2024  09:19 EST      Part of this note may be an electronic transcription/translation of spoken language to printed text using the Dragon Dictation System.

## 2024-02-13 NOTE — CASE MANAGEMENT/SOCIAL WORK
Discharge Planning Assessment   Mika     Patient Name: Jersey Celaya  MRN: [id number]  Today's Date: 2/13/2024    Admit Date: 2/12/2024    Plan: Patient lives alone, pending clinical course and PT/OT eval.  SNF preference [facility].  Precert required.  PASRR required.   Discharge Needs Assessment       Row Name 02/13/24 1001       Living Environment    People in Home alone    Unique Family Situation Macario - son    Current Living Arrangements home    Potentially Unsafe Housing Conditions none    In the past 12 months has the electric, gas, oil, or water company threatened to shut off services in your home? No    Primary Care Provided by self    Provides Primary Care For no one    Family Caregiver if Needed child(adriana), adult    Family Caregiver Names Macario - son    Quality of Family Relationships helpful;involved;supportive    Able to Return to Prior Arrangements yes       Resource/Environmental Concerns    Resource/Environmental Concerns none    Transportation Concerns none       Transportation Needs    In the past 12 months, has lack of transportation kept you from medical appointments or from getting medications? no    In the past 12 months, has lack of transportation kept you from meetings, work, or from getting things needed for daily living? No       Food Insecurity    Within the past 12 months, you worried that your food would run out before you got the money to buy more. Never true    Within the past 12 months, the food you bought just didn't last and you didn't have money to get more. Never true       Transition Planning    Patient/Family Anticipates Transition to home    Patient/Family Anticipated Services at Transition none    Transportation Anticipated car, drives self;family or friend will provide       Discharge Needs Assessment    Readmission Within the Last 30 Days no previous admission in last 30 days    Equipment Currently Used at Home walker, rolling    Concerns to be Addressed  discharge planning    Anticipated Changes Related to Illness none    Equipment Needed After Discharge none    Discharge Facility/Level of Care Needs nursing facility, skilled                   Discharge Plan       Row Name 02/13/24 1002       Plan    Plan Patient lives alone, pending clinical course and PT/OT eval.  SNF preference [facility].  Precert required.  PASRR required.    Patient/Family in Agreement with Plan yes    Plan Comments Patient lives at home alone.  Macario De Leon can transport at discharge. Patient performs IADLs but does have a RW at home.   Son states patient has declined over last year and has required much more assistance. PCP and pharmacy confirmed. Agreeable to M2B.  Denies financial assistance needs for medication and/or food. PT/OT eval pending.  Macario Egan does prefer [facility], SNF if recommended.  DC Barrier:  MD approaching family concerning comfort care/Hospice, IV decadron, IVF NS 3%, 2L NC, Neuro Sx following.               Selected Continued Care - Prior Encounters Includes continued care and service providers with selected services from prior encounters from 11/14/2023 to 2/13/2024      Discharged on 2/5/2024 Admission date: 2/5/2024 - Discharge disposition: Home or Self Care      Home Medical Care       Service Provider Selected Services Address Phone Fax Patient Preferred    [facility] [place] [address] [phone] --                      Discharged on 1/18/2024 Admission date: 1/17/2024 - Discharge disposition: Home or Self Care      Durable Medical Equipment       Service Provider Selected Services Address Phone Fax Patient Preferred    [facility] [address] [address] [phone] --                          Expected Discharge Date and Time       Expected Discharge Date Expected Discharge  Time    Feb 16, 2024            Demographic Summary       Row Name 02/13/24 0959       General Information    Admission Type inpatient    Arrived From emergency department    Required Notices Provided Important Message from Medicare    Referral Source admission list    Reason for Consult discharge planning    Preferred Language English                   Functional Status       Row Name 02/13/24 1000       Functional Status    Usual Activity Tolerance moderate    Current Activity Tolerance poor       Functional Status, IADL    Medications assistive person    Meal Preparation assistive person    Housekeeping assistive person    Laundry assistive person    Shopping assistive equipment and person       Mental Status    General Appearance WDL WDL       Mental Status Summary    Recent Changes in Mental Status/Cognitive Functioning no changes                  Patient Forms       Row Name 02/13/24 0741       Patient Forms    Important Message from Medicare (IMM) Delivered  IMM reviewed, signed, copy given 2/13    Delivered to Support person    Method of delivery In person                    MARA Bocanegra RN  SIPS/ICU   O: [phone]  C: [phone]  [email or web address]

## 2024-02-13 NOTE — ED PROVIDER NOTES
Subjective   Chief Complaint   Patient presents with    Fall       History of Present Illness  68-year-old male presents emergency department via EMS.  Information available through triage note, due to no family at bedside this time.  Patient fell, unknown when fall occurred her last known normal.  Review of Systems   Unable to perform ROS: Mental status change       Past Medical History:   Diagnosis Date    CAD (coronary artery disease)     COPD (chronic obstructive pulmonary disease)     Depression     Depression     Dizziness     Falls     Hyperlipidemia     Hypertension     Low back pain        Allergies   Allergen Reactions    Pregabalin Swelling    Gluten Meal GI Intolerance       Past Surgical History:   Procedure Laterality Date    CARDIAC CATHETERIZATION N/A 3/22/2021    Procedure: Left Heart Cath with angiogram;  Surgeon: Mack French MD;  Location: [facility] CATH INVASIVE LOCATION;  Service: Cardiovascular;  Laterality: N/A;    CARDIAC CATHETERIZATION N/A 3/22/2021    Procedure: Left ventriculography;  Surgeon: Mack French MD;  Location: [facility] CATH INVASIVE LOCATION;  Service: Cardiovascular;  Laterality: N/A;    CERVICAL SPINE SURGERY      INNER EAR SURGERY      WISDOM TOOTH EXTRACTION         Family History   Problem Relation Age of Onset    Cancer Mother     Hypertension Mother     Cancer Father        Social History     Socioeconomic History    Marital status: Single   Tobacco Use    Smoking status: Every Day     Packs/day: .5     Types: Cigarettes    Smokeless tobacco: Never   Vaping Use    Vaping Use: Never used   Substance and Sexual Activity    Alcohol use: Never    Drug use: Yes     Types: Amphetamines, Marijuana     Comment: last used meth smoked 1/16/24    Sexual activity: Defer           Objective   Physical Exam  Vitals and nursing note reviewed.   Constitutional:       Appearance: He is ill-appearing.   HENT:      Head: Normocephalic and atraumatic.      Nose: Nose normal.       Mouth/Throat:      Mouth: Mucous membranes are moist.      Pharynx: Oropharynx is clear.   Eyes:      Extraocular Movements: Extraocular movements intact.      Conjunctiva/sclera: Conjunctivae normal.      Pupils: Pupils are equal, round, and reactive to light.   Neck:      Comments: C collar in place  Cardiovascular:      Rate and Rhythm: Normal rate and regular rhythm.      Pulses:           Radial pulses are 2+ on the right side and 2+ on the left side.        Dorsalis pedis pulses are 2+ on the right side and 2+ on the left side.      Heart sounds: No murmur heard.     No friction rub. No gallop.   Pulmonary:      Effort: Pulmonary effort is normal.      Breath sounds: Normal breath sounds.   Chest:      Chest wall: No deformity, swelling, tenderness or crepitus.   Abdominal:      General: Bowel sounds are normal.      Palpations: Abdomen is soft.   Musculoskeletal:         General: Normal range of motion.      Comments: No apparent tenderness to extremities. No deformities noted.   Scattered bruising, appears old.   No thoracic or lumbar tenderness noted.      Skin:     General: Skin is warm and dry.      Capillary Refill: Capillary refill takes less than 2 seconds.      Findings: Bruising (Bruising noted to back, some of which appears old.) present.   Neurological:      Mental Status: He is oriented to person, place, and time. He is confused.      GCS: GCS eye subscore is 2. GCS verbal subscore is 4. GCS motor subscore is 4.         Procedures           ED Course  ED Course as of 02/12/24 2303   Mon Feb 12, 2024 1949 Comprehensive Metabolic Panel  Recollect due to hemolysis per lab   [LB]   1951 Hemoglobin(!): 10.5  Similar to previous.  [LB]   2018 Discussed with neurosurgery [LB]   2021 4 mg Decadron IV every 6  Keppra 500 mg IV twice daily [LB]   2023 Mri with and without contrast in the AM [LB]   2125 ICU reports they will call back.  [LB]   2145 Temp: 99.5 °F (37.5 °C) [LB]   2151 Discussed with  "ATIF patel intensivist.  [LB]      ED Course User Index  [LB] Odette Harden APRN      /59   Pulse 62   Temp 99.5 °F (37.5 °C) (Rectal)   Resp 23   Ht 180.3 cm (71\")   Wt 70.3 kg (155 lb)   SpO2 99%   BMI 21.62 kg/m²   Medications   sodium chloride 0.9 % flush 10 mL (has no administration in time range)   sodium chloride 0.9 % flush 10 mL (has no administration in time range)   dexAMETHasone (DECADRON) injection 4 mg (has no administration in time range)   levETIRAcetam (KEPPRA) injection 500 mg (has no administration in time range)   nitroglycerin (NITROSTAT) SL tablet 0.4 mg (has no administration in time range)   sodium chloride 0.9 % flush 10 mL (has no administration in time range)   sodium chloride 0.9 % flush 10 mL (has no administration in time range)   sodium chloride 0.9 % infusion 40 mL (has no administration in time range)   sennosides-docusate (PERICOLACE) 8.6-50 MG per tablet 2 tablet (2 tablets Oral Not Given 2/12/24 2259)     And   polyethylene glycol (MIRALAX) packet 17 g (has no administration in time range)     And   bisacodyl (DULCOLAX) EC tablet 5 mg (has no administration in time range)     And   bisacodyl (DULCOLAX) suppository 10 mg (has no administration in time range)   ondansetron ODT (ZOFRAN-ODT) disintegrating tablet 4 mg (has no administration in time range)     Or   ondansetron (ZOFRAN) injection 4 mg (has no administration in time range)   dexAMETHasone (DECADRON) injection 10 mg (10 mg Intravenous Given 2/12/24 2048)   levETIRAcetam (KEPPRA) injection 1,000 mg (1,000 mg Intravenous Given 2/12/24 2048)     CT Head Without Contrast    Result Date: 2/12/2024  Impression: 1.Findings suspicious for a mass in the posterior left periventricular white matter with surrounding edema, and mass effect. There is effacement of the posterior left lateral ventricle, effacement of the left posterior cerebral sulci, and 2-3 mm rightward midline shift. Finding is " indeterminate but concerning for neoplasm or abscess. MRI with and without contrast is recommended for further evaluation. 2.There appears to be slight peripheral hyperdensity of the mass, and a component of hemorrhage cannot be excluded. This does not appear typical of an acute intraparenchymal hemorrhage, and no other hemorrhage is seen at this time. 3.Opacification of the right mastoid air cells and middle ear spaces with absence of the ossicles, as before. There is also hyperdense attenuation in the area of the right external auditory canal and posteriorly along the right mastoid. Findings could be  related to chronic otomastoiditis with possible postoperative changes on the right. 4.Paranasal sinus mucosal thickening with trace air-fluid levels which could indicate acute sinusitis. Results were called to the ordering provider by Dr. Louie at 2/12/2024 7:46 PM EST. Electronically Signed: Quincy Louie  2/12/2024 7:57 PM EST  Workstation ID: [id number]    CT Cervical Spine Without Contrast    Result Date: 2/12/2024  Impression: No evidence of acute fracture or traumatic subluxation of the cervical spine. Postsurgical changes from C3-C6. Stable partially calcified right thyroid lobe nodule. Correlate with ultrasound if not already performed. Electronically Signed: Emil Norwood MD  2/12/2024 7:42 PM EST  Workstation ID: [id number]    XR Chest 1 View    Result Date: 2/12/2024  1.No radiographic findings of acute cardiopulmonary abnormality. 2.Chronic fractures of the lateral right seventh and eighth ribs. Electronically Signed: Quincy Louie  2/12/2024 7:29 PM EST  Workstation ID: [id number]   Lab Results (last 24 hours)       Procedure Component Value Units Date/Time    CBC & Differential [[id number]]  (Abnormal) Collected: 02/12/24 1859    Specimen: Blood Updated: 02/12/24 1947    Narrative:      The following orders were created for panel order CBC & Differential.  Procedure                               Abnormality          Status                     ---------                               -----------         ------                     CBC Auto Differential[[id number]]        Abnormal            Final result               Scan Slide[[id number]]                                                                    Please view results for these tests on the individual orders.    CBC Auto Differential [[id number]]  (Abnormal) Collected: 02/12/24 1859    Specimen: Blood Updated: 02/12/24 1947     WBC 12.30 10*3/mm3      RBC 3.17 10*6/mm3      Hemoglobin 10.5 g/dL      Hematocrit 33.8 %      .3 fL      MCH 33.1 pg      MCHC 31.1 g/dL      RDW 22.6 %      RDW-SD 84.9 fl      MPV 10.5 fL      Platelets 217 10*3/mm3      Neutrophil % 86.3 %      Lymphocyte % 4.0 %      Monocyte % 9.7 %      Eosinophil % 0.0 %      Basophil % 0.0 %      Neutrophils, Absolute 10.60 10*3/mm3      Lymphocytes, Absolute 0.50 10*3/mm3      Monocytes, Absolute 1.20 10*3/mm3      Eosinophils, Absolute 0.00 10*3/mm3      Basophils, Absolute 0.00 10*3/mm3      nRBC 0.0 /100 WBC     POC Lactate [[id number]]  (Normal) Collected: 02/12/24 1916    Specimen: Blood Updated: 02/12/24 1918     Lactate 1.4 mmol/L      Comment: Serial Number: [id number]:  [id number]       Urinalysis With Culture If Indicated - Straight Cath [[id number]]  (Abnormal) Collected: 02/12/24 1931    Specimen: Urine from Straight Cath Updated: 02/12/24 1947     Color, UA Yellow     Appearance, UA Clear     pH, UA 5.5     Specific Gravity, UA 1.025     Glucose, UA Negative     Ketones, UA 80 mg/dL (3+)     Bilirubin, UA Negative     Blood, UA Large (3+)     Protein, UA 30 mg/dL (1+)     Leuk Esterase, UA Negative     Nitrite, UA Negative     Urobilinogen, UA 1.0 E.U./dL    Narrative:      In absence of clinical symptoms, the presence of pyuria, bacteria, and/or nitrites on the urinalysis result does not correlate with infection.    Urine Drug Screen - Straight Cath [[id number]]  (Normal) Collected:  02/12/24 1931    Specimen: Urine from Straight Cath Updated: 02/12/24 2006     Amphet/Methamphet, Screen Negative     Barbiturates Screen, Urine Negative     Benzodiazepine Screen, Urine Negative     Cocaine Screen, Urine Negative     Opiate Screen Negative     THC, Screen, Urine Negative     Methadone Screen, Urine Negative     Oxycodone Screen, Urine Negative    Narrative:      Negative Thresholds Per Drugs Screened:    Amphetamines                 500 ng/ml  Barbiturates                 200 ng/ml  Benzodiazepines              100 ng/ml  Cocaine                      300 ng/ml  Methadone                    300 ng/ml  Opiates                      300 ng/ml  Oxycodone                    100 ng/ml  THC                           50 ng/ml    The Normal Value for all drugs tested is negative. This report includes final unconfirmed screening results to be used for medical treatment purposes only. Unconfirmed results must not be used for non-medical purposes such as employment or legal testing. Clinical consideration should be applied to any drug of abuse test, particularly when unconfirmed results are used.          All urine drugs of abuse requests without chain of custody are for medical screening purposes only.  False positives are possible.      Urinalysis, Microscopic Only - Straight Cath [[id number]]  (Abnormal) Collected: 02/12/24 1931    Specimen: Urine from Straight Cath Updated: 02/12/24 2006     RBC, UA 11-20 /HPF      WBC, UA 3-5 /HPF      Comment: Urine culture not indicated.        Bacteria, UA None Seen /HPF      Squamous Epithelial Cells, UA 0-2 /HPF      Transitional Epithelial Cells, UA 0-2 /HPF      Hyaline Casts, UA 0-2 /LPF      Methodology Manual Light Microscopy    POC Creatinine [[id number]]  (Normal) Collected: 02/12/24 2001    Specimen: Arterial Blood Updated: 02/12/24 2003     Creatinine 0.86 mg/dL      Comment: Serial Number: [id number]:  [id number]        eGFR 94.9 mL/min/1.73     Blood Gas,  Arterial - [[id number]]  (Abnormal) Collected: 02/12/24 2001    Specimen: Arterial Blood Updated: 02/12/24 2003     Site Right Radial     Garfield's Test Positive     pH, Arterial 7.478 pH units      pCO2, Arterial 23.1 mm Hg      pO2, Arterial 132.2 mm Hg      HCO3, Arterial 17.1 mmol/L      Base Excess, Arterial -4.9 mmol/L      Comment: Serial Number: [id number]:  [id number]        O2 Saturation, Arterial 99.3 %      Barometric Pressure for Blood Gas --     Comment: N/A        Modality Cannula     FIO2 28 %      Hemodilution No    POCT Electrolytes +HGB +HCT [[id number]]  (Abnormal) Collected: 02/12/24 2001    Specimen: Arterial Blood Updated: 02/12/24 2003     Sodium 129 mmol/L      POC Potassium 3.9 mmol/L      Ionized Calcium 1.13 mmol/L      Comment: Serial Number: [id number]:  [id number]        Glucose 97 mg/dL      Hematocrit 32 %      Hemoglobin 10.9 g/dL     POC Lactate [[id number]]  (Normal) Collected: 02/12/24 2001    Specimen: Arterial Blood Updated: 02/12/24 2003     Lactate 1.0 mmol/L      Comment: Serial Number: [id number]:  [id number]       POC Glucose Once [[id number]]  (Normal) Collected: 02/12/24 2001    Specimen: Arterial Blood Updated: 02/12/24 2003     Glucose 97 mg/dL      Comment: Serial Number: [id number]:  [id number]       Comprehensive Metabolic Panel [[id number]]  (Abnormal) Collected: 02/12/24 2011    Specimen: Blood Updated: 02/12/24 2035     Glucose 103 mg/dL      BUN 30 mg/dL      Creatinine 0.80 mg/dL      Sodium 132 mmol/L      Potassium 4.1 mmol/L      Chloride 98 mmol/L      CO2 17.0 mmol/L      Calcium 8.7 mg/dL      Total Protein 6.1 g/dL      Albumin 3.7 g/dL      ALT (SGPT) 50 U/L      AST (SGOT) 132 U/L      Alkaline Phosphatase 213 U/L      Total Bilirubin 0.9 mg/dL      Globulin 2.4 gm/dL      A/G Ratio 1.5 g/dL      BUN/Creatinine Ratio 37.5     Anion Gap 17.0 mmol/L      eGFR 97.0 mL/min/1.73     Narrative:      GFR Normal >60  Chronic Kidney Disease <60  Kidney Failure <15      CK  [[id number]]  (Abnormal) Collected: 02/12/24 2011    Specimen: Blood Updated: 02/12/24 2047     Creatine Kinase 4,195 U/L     BNP [[id number]]  (Normal) Collected: 02/12/24 2011    Specimen: Blood Updated: 02/12/24 2035     proBNP 408.6 pg/mL     Narrative:      This assay is used as an aid in the diagnosis of individuals suspected of having heart failure. It can be used as an aid in the diagnosis of acute decompensated heart failure (ADHF) in patients presenting with signs and symptoms of ADHF to the emergency department (ED). In addition, NT-proBNP of <300 pg/mL indicates ADHF is not likely.    Age Range Result Interpretation  NT-proBNP Concentration (pg/mL:      <50             Positive            >450                   Gray                 300-450                    Negative             <300    50-75           Positive            >900                  Gray                300-900                  Negative            <300      >75             Positive            >1800                  Gray                300-1800                  Negative            <300    Single High Sensitivity Troponin T [[id number]]  (Abnormal) Collected: 02/12/24 2011    Specimen: Blood Updated: 02/12/24 2035     HS Troponin T 37 ng/L     Narrative:      High Sensitive Troponin T Reference Range:  <14.0 ng/L- Negative Female for AMI  <22.0 ng/L- Negative Male for AMI  >=14 - Abnormal Female indicating possible myocardial injury.  >=22 - Abnormal Male indicating possible myocardial injury.   Clinicians would have to utilize clinical acumen, EKG, Troponin, and serial changes to determine if it is an Acute Myocardial Infarction or myocardial injury due to an underlying chronic condition.                                                      Medical Decision Making  Problems Addressed:  Altered mental status, unspecified altered mental status type: complicated acute illness or injury    Amount and/or Complexity of Data Reviewed  Labs: ordered.  Decision-making details documented in ED Course.  Radiology: ordered.  ECG/medicine tests: ordered.    Risk  Prescription drug management.  Decision regarding hospitalization.    Discussed with ED attending physician.   Chart Review: Previous ED observation note  Labs: CBC CMP reviewed.  Lactate 1.  ABG reviewed.  Noted elevated CK, however given edema on head CT, will defer to ICU/ nephrology at this time.  Imaging: CT Head Without Contrast    Result Date: 2/12/2024  Impression: 1.Findings suspicious for a mass in the posterior left periventricular white matter with surrounding edema, and mass effect. There is effacement of the posterior left lateral ventricle, effacement of the left posterior cerebral sulci, and 2-3 mm rightward midline shift. Finding is indeterminate but concerning for neoplasm or abscess. MRI with and without contrast is recommended for further evaluation. 2.There appears to be slight peripheral hyperdensity of the mass, and a component of hemorrhage cannot be excluded. This does not appear typical of an acute intraparenchymal hemorrhage, and no other hemorrhage is seen at this time. 3.Opacification of the right mastoid air cells and middle ear spaces with absence of the ossicles, as before. There is also hyperdense attenuation in the area of the right external auditory canal and posteriorly along the right mastoid. Findings could be  related to chronic otomastoiditis with possible postoperative changes on the right. 4.Paranasal sinus mucosal thickening with trace air-fluid levels which could indicate acute sinusitis. Results were called to the ordering provider by Dr. Louie at 2/12/2024 7:46 PM EST. Electronically Signed: Quincy Louie  2/12/2024 7:57 PM EST  Workstation ID: [id number]    CT Cervical Spine Without Contrast    Result Date: 2/12/2024  Impression: No evidence of acute fracture or traumatic subluxation of the cervical spine. Postsurgical changes from C3-C6. Stable partially calcified  right thyroid lobe nodule. Correlate with ultrasound if not already performed. Electronically Signed: Emil Norwood MD  2/12/2024 7:42 PM EST  Workstation ID: [id number]    XR Chest 1 View    Result Date: 2/12/2024  1.No radiographic findings of acute cardiopulmonary abnormality. 2.Chronic fractures of the lateral right seventh and eighth ribs. Electronically Signed: Quincy Sheppardgeovanna  2/12/2024 7:29 PM EST  Workstation ID: [id number]       Patient presents to the ED for the above complaint, underwent the above, exam and workup.  Placed on appropriate monitoring.   Differential diagnoses considered for patient presentation, this list is not all inclusive of diagnoses considered: Stroke, hemorrhage, rhabdomyolysis, electrolyte imbalance, arrhythmia.  Patient responds to painful stimuli, appears to move all extremities. But does not answer questions or respond to questions.no UTI. CT abnormal and consulted Neurosurgery. No apparent sign of infection at this time. Afebrile.   Elevated CK noted, however holding IVF given CT with edema.   Discussed with Neurosurgery, dr. Fried, recommend MRI with and without contras in the morning.  IV decadron 10mg now, Keppra loading dose 1000mg now.   Mental status remains unchanged in the ED. Maintaining his airway. C collar replaced given previous chart review. ICU consulted for admission.   Disposition: Patient will be admitted to ICU given abnormal head CT, frequent neurochecks.   I have personally provided 40 minutes of critical care time exclusive of time spent on separately billable procedures.  Time includes review of laboratory data, radiology results, discussion with specialist, ED attending, monitoring for potential decompensation.  Interventions were performed as documented as above.  Note Disclaimer: At [facility], we believe that sharing information builds trust and better relationships. You are receiving this note because you recently visited [facility]. It is  possible you will see health information before a provider has talked with you about it. This kind of information can be easy to misunderstand. To help you fully understand what it means for your health, we urge you to discuss this note with your provider.Note dictated utilizing Dragon Dictation. Appropriate PPE worn during patient interactions.        Final diagnoses:   Altered mental status, unspecified altered mental status type   Fall, initial encounter   Traumatic rhabdomyolysis, initial encounter       ED Disposition  ED Disposition       ED Disposition   Decision to Admit    Condition   --    Comment   Level of Care: Critical Care [6]   Admitting Physician: ARCELIA NIEVES [[id number]]   Attending Physician: ARCELIA NIEVES [[id number]]                 No follow-up provider specified.       Medication List      No changes were made to your prescriptions during this visit.            Odette Harden, APRN  02/12/24 4880

## 2024-02-13 NOTE — ED NOTES
Nursing report ED to floor  Jersey Celaya  68 y.o.  male    HPI:   Chief Complaint   Patient presents with    Fall       Admitting doctor:   Pedro Rashid DO    Admitting diagnosis:   The primary encounter diagnosis was Altered mental status, unspecified altered mental status type. Diagnoses of Fall, initial encounter and Traumatic rhabdomyolysis, initial encounter were also pertinent to this visit.    Code status:   Current Code Status       Date Active Code Status Order ID Comments User Context       2/12/2024 2257 No CPR (Do Not Attempt to Resuscitate) [id number]  Francheska Anders APRN ED        Question Answer    Code Status (Patient has no pulse and is not breathing) No CPR (Do Not Attempt to Resuscitate)    Medical Interventions (Patient has pulse or is breathing) Full Support    Level Of Support Discussed With Next of Kin (If No Surrogate)                    Allergies:   Pregabalin and Gluten meal    Isolation:  No active isolations     Fall Risk:  Fall Risk Assessment was completed, and patient is at high risk for falls.   Predictive Model Details         79 (High) Factor Value    Calculated 2/13/2024 03:09 Age 68    Risk of Fall Model Stephanie Coma Scale 7     Musculoskeletal Assessment WDL     Active Peripheral IV Present     Imaging order in this encounter Present     Respiratory Rate 23     Financial Class Other     Number of Distinct Medication Classes administered 5     Magnesium not on file     Diastolic BP 62     Number of administrations of Anti-Convulsants 1     Tobacco Use Current     Calcium 8.7 mg/dL     Apolinar Scale not on file     Chloride 98 mmol/L     Peripheral Vascular Assessment WDL     Albumin 3.7 g/dL     Total Bilirubin 0.9 mg/dL     Cardiac Assessment X     Clinically Relevant Sex Not Female     Gastrointestinal Assessment WDL     ALT 50 U/L     Number of administrations of Analgesic Narcotics 1     Days after Admission 0.354     Creatinine 0.8 mg/dL     Skin  Assessment X     Potassium 4.1 mmol/L         Weight:       02/12/24  1841   Weight: 70.3 kg (155 lb)       Intake and Output  No intake or output data in the 24 hours ending 02/13/24 0309    Diet:   Dietary Orders (From admission, onward)       Start     Ordered    02/12/24 2257  NPO Diet NPO Type: Strict NPO  Diet Effective Now        Question:  NPO Type  Answer:  Strict NPO    02/12/24 2257                     Most recent vitals:   Vitals:    02/13/24 0201 02/13/24 0216 02/13/24 0248 02/13/24 0300   BP: 117/63 124/63 120/69 124/62   Pulse: 59 64 66 60   Resp:       Temp:       TempSrc:       SpO2: 98%  98% 100%   Weight:       Height:           Active LDAs/IV Access:   Lines, Drains & Airways       Active LDAs       Name Placement date Placement time Site Days    Peripheral IV 02/12/24 1858 Anterior;Right Forearm 02/12/24 1858  Forearm  less than 1    External Urinary Catheter 02/12/24 2151  --  less than 1                    Skin Condition:   Skin Assessments (last day)       Date/Time Skin WDL Skin Integrity    02/12/24 19:17:51 X;characteristics abrasion             Labs (abnormal labs have a star):   Labs Reviewed   COMPREHENSIVE METABOLIC PANEL - Abnormal; Notable for the following components:       Result Value    Glucose 103 (*)     BUN 30 (*)     Sodium 132 (*)     CO2 17.0 (*)     ALT (SGPT) 50 (*)     AST (SGOT) 132 (*)     Alkaline Phosphatase 213 (*)     BUN/Creatinine Ratio 37.5 (*)     Anion Gap 17.0 (*)     All other components within normal limits    Narrative:     GFR Normal >60  Chronic Kidney Disease <60  Kidney Failure <15     CK - Abnormal; Notable for the following components:    Creatine Kinase 4,195 (*)     All other components within normal limits   URINALYSIS W/ CULTURE IF INDICATED - Abnormal; Notable for the following components:    Ketones, UA 80 mg/dL (3+) (*)     Blood, UA Large (3+) (*)     Protein, UA 30 mg/dL (1+) (*)     All other components within normal limits    Narrative:      In absence of clinical symptoms, the presence of pyuria, bacteria, and/or nitrites on the urinalysis result does not correlate with infection.   SINGLE HSTROPONIN T - Abnormal; Notable for the following components:    HS Troponin T 37 (*)     All other components within normal limits    Narrative:     High Sensitive Troponin T Reference Range:  <14.0 ng/L- Negative Female for AMI  <22.0 ng/L- Negative Male for AMI  >=14 - Abnormal Female indicating possible myocardial injury.  >=22 - Abnormal Male indicating possible myocardial injury.   Clinicians would have to utilize clinical acumen, EKG, Troponin, and serial changes to determine if it is an Acute Myocardial Infarction or myocardial injury due to an underlying chronic condition.        CBC WITH AUTO DIFFERENTIAL - Abnormal; Notable for the following components:    WBC 12.30 (*)     RBC 3.17 (*)     Hemoglobin 10.5 (*)     Hematocrit 33.8 (*)     .3 (*)     MCH 33.1 (*)     MCHC 31.1 (*)     RDW 22.6 (*)     RDW-SD 84.9 (*)     Neutrophil % 86.3 (*)     Lymphocyte % 4.0 (*)     Eosinophil % 0.0 (*)     Neutrophils, Absolute 10.60 (*)     Lymphocytes, Absolute 0.50 (*)     Monocytes, Absolute 1.20 (*)     All other components within normal limits   URINALYSIS, MICROSCOPIC ONLY - Abnormal; Notable for the following components:    RBC, UA 11-20 (*)     WBC, UA 3-5 (*)     All other components within normal limits   BLOOD GAS, ARTERIAL - Abnormal; Notable for the following components:    pH, Arterial 7.478 (*)     pCO2, Arterial 23.1 (*)     pO2, Arterial 132.2 (*)     HCO3, Arterial 17.1 (*)     Base Excess, Arterial -4.9 (*)     O2 Saturation, Arterial 99.3 (*)     All other components within normal limits   ELECTROLYTES + H&H - Abnormal; Notable for the following components:    Sodium 129 (*)     Ionized Calcium 1.13 (*)     Hematocrit 32 (*)     Hemoglobin 10.9 (*)     All other components within normal limits   BNP (IN-HOUSE) - Normal    Narrative:      This assay is used as an aid in the diagnosis of individuals suspected of having heart failure. It can be used as an aid in the diagnosis of acute decompensated heart failure (ADHF) in patients presenting with signs and symptoms of ADHF to the emergency department (ED). In addition, NT-proBNP of <300 pg/mL indicates ADHF is not likely.    Age Range Result Interpretation  NT-proBNP Concentration (pg/mL:      <50             Positive            >450                   Gray                 300-450                    Negative             <300    50-75           Positive            >900                  Gray                300-900                  Negative            <300      >75             Positive            >1800                  Gray                300-1800                  Negative            <300   URINE DRUG SCREEN - Normal    Narrative:     Negative Thresholds Per Drugs Screened:    Amphetamines                 500 ng/ml  Barbiturates                 200 ng/ml  Benzodiazepines              100 ng/ml  Cocaine                      300 ng/ml  Methadone                    300 ng/ml  Opiates                      300 ng/ml  Oxycodone                    100 ng/ml  THC                           50 ng/ml    The Normal Value for all drugs tested is negative. This report includes final unconfirmed screening results to be used for medical treatment purposes only. Unconfirmed results must not be used for non-medical purposes such as employment or legal testing. Clinical consideration should be applied to any drug of abuse test, particularly when unconfirmed results are used.          All urine drugs of abuse requests without chain of custody are for medical screening purposes only.  False positives are possible.     POC LACTATE - Normal   POCT CREATININE - Normal   POC LACTATE - Normal   POCT GLUCOSE FINGERSTICK - Normal   MRSA SCREEN, PCR   RAINBOW DRAW    Narrative:     The following orders were created for panel order  [facility] Draw.  Procedure                               Abnormality         Status                     ---------                               -----------         ------                     Green Top (Gel)[[id number]]                                                             Lavender Top[[id number]]                                     Final result               Light Blue Top[[id number]]                                   Final result                 Please view results for these tests on the individual orders.   BLOOD GAS, ARTERIAL   MAGNESIUM   PHOSPHORUS   COMPREHENSIVE METABOLIC PANEL   CBC WITH AUTO DIFFERENTIAL   POC LACTATE   LAVENDER TOP   LIGHT BLUE TOP   CBC AND DIFFERENTIAL    Narrative:     The following orders were created for panel order CBC & Differential.  Procedure                               Abnormality         Status                     ---------                               -----------         ------                     CBC Auto Differential[[id number]]        Abnormal            Final result               Scan Slide[[id number]]                                                                    Please view results for these tests on the individual orders.   CBC AND DIFFERENTIAL    Narrative:     The following orders were created for panel order CBC & Differential.  Procedure                               Abnormality         Status                     ---------                               -----------         ------                     CBC Auto Differential[[id number]]                                                         Please view results for these tests on the individual orders.       LOC:  Unresponsive    Telemetry:  Critical Care    Cardiac Monitoring Ordered: yes    EKG:   ECG 12 Lead Altered Mental Status   Preliminary Result   HEART RATE= 70  bpm   RR Interval= 860  ms   AR Interval= 120  ms   P Horizontal Axis= -3  deg   P Front Axis= 55  deg   QRSD Interval= 90  ms   QT Interval= 405   ms   QTcB= 437  ms   QRS Axis= 62  deg   T Wave Axis= 57  deg   - NORMAL ECG -   Sinus rhythm   When compared with ECG of 12-Feb-2024 18:55:47,   Significant change in rhythm: previously atrial fibrillation   Electronically Signed By:    Date and Time of Study: 2024-02-12 21:25:08      ECG 12 Lead Altered Mental Status   Preliminary Result   HEART RATE= 80  bpm   RR Interval= 747  ms   AL Interval=   ms   P Horizontal Axis=   deg   P Front Axis=   deg   QRSD Interval= 90  ms   QT Interval= 414  ms   QTcB= 479  ms   QRS Axis= 60  deg   T Wave Axis= 50  deg   - ABNORMAL ECG -   Atrial fibrillation   Electronically Signed By:    Date and Time of Study: 2024-02-12 18:55:47          Medications Given in the ED:   Medications   sodium chloride 0.9 % flush 10 mL (has no administration in time range)   sodium chloride 0.9 % flush 10 mL (has no administration in time range)   dexAMETHasone (DECADRON) injection 4 mg (4 mg Intravenous Given 2/13/24 0306)   levETIRAcetam (KEPPRA) injection 500 mg (has no administration in time range)   nitroglycerin (NITROSTAT) SL tablet 0.4 mg (has no administration in time range)   sodium chloride 0.9 % flush 10 mL (10 mL Intravenous Given 2/13/24 0051)   sodium chloride 0.9 % flush 10 mL (has no administration in time range)   sodium chloride 0.9 % infusion 40 mL (has no administration in time range)   sennosides-docusate (PERICOLACE) 8.6-50 MG per tablet 2 tablet (2 tablets Oral Not Given 2/12/24 4889)     And   polyethylene glycol (MIRALAX) packet 17 g (has no administration in time range)     And   bisacodyl (DULCOLAX) EC tablet 5 mg (has no administration in time range)     And   bisacodyl (DULCOLAX) suppository 10 mg (has no administration in time range)   ondansetron ODT (ZOFRAN-ODT) disintegrating tablet 4 mg (has no administration in time range)     Or   ondansetron (ZOFRAN) injection 4 mg (has no administration in time range)   hydrALAZINE (APRESOLINE) injection 10 mg (has no  administration in time range)   dexAMETHasone (DECADRON) injection 10 mg (10 mg Intravenous Given 2/12/24 2048)   levETIRAcetam (KEPPRA) injection 1,000 mg (1,000 mg Intravenous Given 2/12/24 2048)   morphine injection 4 mg (4 mg Intravenous Given 2/13/24 0051)   gadoteridol (PROHANCE) injection 15 mL (14 mL Intravenous Given 2/13/24 0114)       Imaging results:  MRI Brain With & Without Contrast    Result Date: 2/13/2024  Impression: 1.Large mass centered in the left parietal white matter and extensively involving the corpus callosum crossing to the right parietal white matter with extensive surrounding signal abnormality concerning for infiltrating tumor. This is most consistent with a high-grade glioma. 2.There is extensive intratumoral susceptibility artifact suggesting hemorrhagic component. 3.There is significant mass effect with sulcal effacement on the left and partial effacement of the right lateral ventricle. There is some enlargement of the left temporal horn, which could suggest a trapped component. 4.There is 4 mm rightward shift of midline structures due to mass effect. No herniation. 5.No evidence of acute infarct or acute intracranial hemorrhage. Electronically Signed: Dieter Quinones MD  2/13/2024 1:30 AM EST  Workstation ID: [id number]    CT Head Without Contrast    Result Date: 2/12/2024  Impression: 1.Findings suspicious for a mass in the posterior left periventricular white matter with surrounding edema, and mass effect. There is effacement of the posterior left lateral ventricle, effacement of the left posterior cerebral sulci, and 2-3 mm rightward midline shift. Finding is indeterminate but concerning for neoplasm or abscess. MRI with and without contrast is recommended for further evaluation. 2.There appears to be slight peripheral hyperdensity of the mass, and a component of hemorrhage cannot be excluded. This does not appear typical of an acute intraparenchymal hemorrhage, and no other  hemorrhage is seen at this time. 3.Opacification of the right mastoid air cells and middle ear spaces with absence of the ossicles, as before. There is also hyperdense attenuation in the area of the right external auditory canal and posteriorly along the right mastoid. Findings could be  related to chronic otomastoiditis with possible postoperative changes on the right. 4.Paranasal sinus mucosal thickening with trace air-fluid levels which could indicate acute sinusitis. Results were called to the ordering provider by Dr. Louie at 2/12/2024 7:46 PM EST. Electronically Signed: Quincy Louie  2/12/2024 7:57 PM EST  Workstation ID: [id number]    CT Cervical Spine Without Contrast    Result Date: 2/12/2024  Impression: No evidence of acute fracture or traumatic subluxation of the cervical spine. Postsurgical changes from C3-C6. Stable partially calcified right thyroid lobe nodule. Correlate with ultrasound if not already performed. Electronically Signed: Emil Norwood MD  2/12/2024 7:42 PM EST  Workstation ID: [id number]    XR Chest 1 View    Result Date: 2/12/2024  1.No radiographic findings of acute cardiopulmonary abnormality. 2.Chronic fractures of the lateral right seventh and eighth ribs. Electronically Signed: Quincy Louie  2/12/2024 7:29 PM EST  Workstation ID: [id number]     Social issues:   Social History     Socioeconomic History    Marital status: Single   Tobacco Use    Smoking status: Every Day     Packs/day: .5     Types: Cigarettes    Smokeless tobacco: Never   Vaping Use    Vaping Use: Never used   Substance and Sexual Activity    Alcohol use: Never    Drug use: Yes     Types: Amphetamines, Marijuana     Comment: last used meth smoked 1/16/24    Sexual activity: Defer       NIH Stroke Scale:  Interval: (not recorded)  1a. Level of Consciousness: (not recorded)  1b. LOC Questions: (not recorded)  1c. LOC Commands: (not recorded)  2. Best Gaze: (not recorded)  3. Visual: (not recorded)  4. Facial Palsy: (not  recorded)  5a. Motor Arm, Left: (not recorded)  5b. Motor Arm, Right: (not recorded)  6a. Motor Leg, Left: (not recorded)  6b. Motor Leg, Right: (not recorded)  7. Limb Ataxia: (not recorded)  8. Sensory: (not recorded)  9. Best Language: (not recorded)  10. Dysarthria: (not recorded)  11. Extinction and Inattention (formerly Neglect): (not recorded)    Total (NIH Stroke Scale): (not recorded)     Additional notable assessment information: NA     Nursing report ED to floor:  GABO Holm RN   02/13/24 03:09 EST

## 2024-02-13 NOTE — PROGRESS NOTES
"Critical Care Progress Note     Jersey Celaya : 1955 MRN:[id number] LOS:0  Rm: 2316/1     Principal Problem: AMS (altered mental status)     Reason for follow up: All the medical problems listed below    Summary     Jersey Celaya is a 68 y.o. male with PMH of hypertension, COPD, CAD, anxiety, depression, cervical disc degeneration was brought to the hospital via EMS and was admitted with a principal diagnosis of AMS (altered mental status).  Information for HPI taken from chart review and discussion with patient's son Macario as patient is minimally responsive at time of assessment.  It is unclear who called EMS as there was no family/friends that came with the patient.  In the emergency department patient was responding only to painful stimuli.  He had no purposeful movement to his extremities.  CT head was obtained which showed \"Findings suspicious for a mass in the posterior left periventricular white matter with surrounding edema, and mass effect. There is effacement of the posterior left lateral ventricle, effacement of the left posterior cerebral sulci, and 2-3 mm rightward midline shift. Finding is indeterminate but concerning for neoplasm or abscess. MRI with and without contrast is recommended for further evaluation. 2.There appears to be slight peripheral hyperdensity of the mass, and a component of hemorrhage cannot be excluded. This does not appear typical of an acute intraparenchymal hemorrhage, and no other hemorrhage is seen at this time.  3.Opacification of the right mastoid air cells and middle ear spaces with absence of the ossicles, as before. There is also hyperdense attenuation in the judah of the right external auditory canal and posteriorly along the right mastoid. Findings could be  related to chronic otomastoiditis with possible postoperative changes on the right.  4.Paranasal sinus mucosal thickening with trace air-fluid levels which could indicate acute sinusitis\".  Per ER " "documentation, neurosurgery was consulted and recommended MRI in the morning, and treatment with IV Keppra loading dose and IV Decadron 10 mg every 6 hours.  Patient was in c-collar on arrival, after chart review this was applied during emergency room visit in January of this year (see below).     Speaking with patient's son Macario, he states that his father \"has been declining for over a year\".  States that he has had to bathe his father, help him eat, and dress him.  States that he has been trying to get him placed in an assisted living or establish home health.  On assessment patient's pupils are unequal, and he only responds to vigorous stimulation.  He is oxygenating, and an ABG did not show hypoxia or hypercapnia.  MRI was obtained which showed \"large mass centered in the left parietal white matter and extensively involving the corpus callosum crossing to the right parietal white matter with extensive surrounding signal abnormality concerning for infiltrating tumor. This is most consistent with a high-grade glioma. There is extensive intratumoral susceptibility artifact suggesting hemorrhagic component. There is significant mass effect with sulcal effacement on the left and partial effacement of the right lateral ventricle. There is some enlargement of the left temporal horn, which could suggest a trapped component. There is 4 mm rightward shift of midline structures due to mass effect. No herniation. No evidence of acute infarct or acute intracranial hemorrhage\".  Neurosurgery was contacted concerning these findings, and instructed to continue with current regimen of decadron.  Neurosurgery evaluating patient, recommending IV steroids and hypertonic IVFs and monitoring.  Hospice consulted upon family request.  Will hold on hypertonic IVF until decision made whether patient will discharge home with hospice or continue treatment.    Significant Events     02/13/24 : Initially planning to bolus and start " hypertonic IVF.  After discussion with family, family is contemplating transitioning to hospice and palliative care.  Hospice has met with patient and reviewed chart, and patient does not meet criteria for GIP hospice, but they are agreeable to discharge home with hospice.  They are awaiting family to further discuss with patient's family.  Will hold off on aggressive treatment until full decision making has been finalized.  If patient planning to discharge home with hospice, then may downgrade to hospitalist service at that time.      Assessment / Plan     Acute encephalopathy  Large left parietal mass in white matter  S/p fall at home, recurrent falls per documentation  -Patient brought in via EMS, however uncertain of who called  -Patient minimally responsive to stimuli  -Oxygenation status normal  -CT head and MRI head noted.  -Neurosurgery consulted in ED.  Neurochecks as ordered.  -IV Decadron every 6 hours  -IV Keppra for seizure prophylaxis  -Hypertonic saline recommended, ordered, but then held as discussing palliative.    -Heme/Onc consult if planned continued treatment.  -Palliative care consulted as requested, doesn't meet GIP criteria, but may discharge home with hospice.     Rhabdomyolysis  -Patient with multiple falls per documentation  -CK 4195  -Patient does not meet SIRS criteria, do not suspect infection at this time  -Continue IVFs.     Chronic:  Hypertension: Patient currently normotensive, resume home antihypertensives once clinically appropriate   COPD: Not in exacerbation, continue with oxygen supplementation as needed  CAD: Continue home aspirin once able clinically appropriate  Anxiety/depression: Resume home citalopram once clinically appropriate  Vitamin D deficiency: Resume home cyanocobalamin once clinically appropriate    Disposition:  Possible discharge home with hospice tonight or tomorrow.    Code status:   Level Of Support Discussed With: Next of Kin (If No Surrogate)  Code Status  (Patient has no pulse and is not breathing): No CPR (Do Not Attempt to Resuscitate)  Medical Interventions (Patient has pulse or is breathing): Full Support       Nutrition:   NPO Diet NPO Type: Strict NPO   Patient isn't on Tube Feeding     DVT prophylaxis:  Mechanical DVT prophylaxis orders are present.         Subjective / Review of systems     Review of Systems   Unable to perform ROS: Mental status change        Objective / Physical Exam     Vital signs:  Temp: 96.5 °F (35.8 °C)  BP: 99/49  Heart Rate: 55  Resp: 15  SpO2: 99 %  Weight: 57.2 kg (126 lb 1.7 oz)    Admission Weight: Weight: 70.3 kg (155 lb)  Current Weight: Weight: 57.2 kg (126 lb 1.7 oz)    Input/Output in last 24 hours:    Intake/Output Summary (Last 24 hours) at 2/13/2024 1702  Last data filed at 2/13/2024 0730  Gross per 24 hour   Intake 0 ml   Output --   Net 0 ml      Net IO Since Admission: 0 mL [02/13/24 1702]     Physical Exam  Constitutional:       Appearance: He is ill-appearing. He is not toxic-appearing or diaphoretic.      Comments: Awake, appears older than stated age.   HENT:      Head: Normocephalic and atraumatic.      Nose: Nose normal. No congestion.      Mouth/Throat:      Mouth: Mucous membranes are moist.      Pharynx: Oropharynx is clear. No oropharyngeal exudate.   Eyes:      General: No scleral icterus.     Extraocular Movements: Extraocular movements intact.      Conjunctiva/sclera: Conjunctivae normal.      Pupils: Pupils are equal, round, and reactive to light.   Cardiovascular:      Rate and Rhythm: Normal rate and regular rhythm.      Pulses: Normal pulses.      Heart sounds: Normal heart sounds. No murmur heard.  Pulmonary:      Effort: Pulmonary effort is normal. No respiratory distress.      Breath sounds: Normal breath sounds. No wheezing, rhonchi or rales.   Abdominal:      General: Bowel sounds are normal. There is no distension.      Tenderness: There is no abdominal tenderness.   Musculoskeletal:       Cervical back: Neck supple.      Right lower leg: No edema.      Left lower leg: No edema.   Skin:     General: Skin is warm and dry.      Coloration: Skin is pale.   Neurological:      General: No focal deficit present.      Comments: Non-conversant, unable to assess orientation.  Moves all extremities, does not follow commands, localizes to pain.   Psychiatric:      Comments: Anxious/restless.          Radiology and Labs     Results from last 7 days   Lab Units 02/13/24  0433 02/12/24  2001 02/12/24  1859   WBC 10*3/mm3 9.90  --  12.30*   HEMOGLOBIN g/dL 10.5*  --  10.5*   HEMOGLOBIN, POC g/dL  --  10.9*  --    HEMATOCRIT % 31.9*  --  33.8*   HEMATOCRIT POC %  --  32*  --    PLATELETS 10*3/mm3 182  --  217           Results from last 7 days   Lab Units 02/13/24  1014 02/13/24 0433 02/12/24 2011 02/12/24 2001   SODIUM mmol/L 133*  132* 132* 132*  --    POTASSIUM mmol/L 4.4 4.3 4.1  --    CHLORIDE mmol/L 99 95* 98  --    CO2 mmol/L 20.0* 20.0* 17.0*  --    BUN mg/dL 26* 29* 30*  --    CREATININE mg/dL 0.61* 0.72* 0.80 0.86   GLUCOSE mg/dL 116* 121* 103*  --    MAGNESIUM mg/dL  --  1.9  --   --    PHOSPHORUS mg/dL  --  4.2  --   --       Results from last 7 days   Lab Units 02/13/24 0433 02/12/24 2011   ALK PHOS U/L 199* 213*   AST (SGOT) U/L 161* 132*   ALT (SGPT) U/L 62* 50*     Results from last 7 days   Lab Units 02/12/24 2001   PH, ARTERIAL pH units 7.478*   PCO2, ARTERIAL mm Hg 23.1*   PO2 ART mm Hg 132.2*   O2 SATURATION ART % 99.3*   FIO2 % 28   HCO3 ART mmol/L 17.1*   BASE EXCESS ART mmol/L -4.9*       MRI Brain With & Without Contrast    Result Date: 2/13/2024  Impression: 1.Large mass centered in the left parietal white matter and extensively involving the corpus callosum crossing to the right parietal white matter with extensive surrounding signal abnormality concerning for infiltrating tumor. This is most consistent with a high-grade glioma. 2.There is extensive intratumoral susceptibility  artifact suggesting hemorrhagic component. 3.There is significant mass effect with sulcal effacement on the left and partial effacement of the right lateral ventricle. There is some enlargement of the left temporal horn, which could suggest a trapped component. 4.There is 4 mm rightward shift of midline structures due to mass effect. No herniation. 5.No evidence of acute infarct or acute intracranial hemorrhage. Electronically Signed: Dieter Quinones MD  2/13/2024 1:30 AM EST  Workstation ID: [id number]    CT Head Without Contrast    Result Date: 2/12/2024  Impression: 1.Findings suspicious for a mass in the posterior left periventricular white matter with surrounding edema, and mass effect. There is effacement of the posterior left lateral ventricle, effacement of the left posterior cerebral sulci, and 2-3 mm rightward midline shift. Finding is indeterminate but concerning for neoplasm or abscess. MRI with and without contrast is recommended for further evaluation. 2.There appears to be slight peripheral hyperdensity of the mass, and a component of hemorrhage cannot be excluded. This does not appear typical of an acute intraparenchymal hemorrhage, and no other hemorrhage is seen at this time. 3.Opacification of the right mastoid air cells and middle ear spaces with absence of the ossicles, as before. There is also hyperdense attenuation in the area of the right external auditory canal and posteriorly along the right mastoid. Findings could be  related to chronic otomastoiditis with possible postoperative changes on the right. 4.Paranasal sinus mucosal thickening with trace air-fluid levels which could indicate acute sinusitis. Results were called to the ordering provider by Dr. Louie at 2/12/2024 7:46 PM EST. Electronically Signed: Quincy Louie  2/12/2024 7:57 PM EST  Workstation ID: [id number]    CT Cervical Spine Without Contrast    Result Date: 2/12/2024  Impression: No evidence of acute fracture or traumatic  subluxation of the cervical spine. Postsurgical changes from C3-C6. Stable partially calcified right thyroid lobe nodule. Correlate with ultrasound if not already performed. Electronically Signed: Emil Norwood MD  2/12/2024 7:42 PM EST  Workstation ID: [id number]    XR Chest 1 View    Result Date: 2/12/2024  1.No radiographic findings of acute cardiopulmonary abnormality. 2.Chronic fractures of the lateral right seventh and eighth ribs. Electronically Signed: Quincy Louie  2/12/2024 7:29 PM EST  Workstation ID: [id number]       Current medications     Scheduled Meds:   dexAMETHasone, 4 mg, Intravenous, Q6H  levETIRAcetam, 500 mg, Intravenous, Q12H  senna-docusate sodium, 2 tablet, Oral, BID  sodium chloride, 10 mL, Intravenous, Q12H        Continuous Infusions:   [Held by provider] sodium chloride, 50 mL/hr, Last Rate: Stopped (02/13/24 1200)          Plan discussed with RN. Reviewed all other data in the last 24 hours, including but not limited to vitals, labs, microbiology, imaging and pertinent notes from other providers.  Plan also discussed with patient's family as well as hospice team at the bedside.      ATIF Dejesus   Critical Care  02/13/24   17:02 EST

## 2024-02-13 NOTE — DISCHARGE PLACEMENT REQUEST
"AddisonDoni cortés (68 y.o. Male)       Date of Birth   1955    Social Security Number       Address   [address] [place] [address] [place] [facility]    Home Phone   [phone]    MRN   [id number]       Voodoo   Anglican    Marital Status   Single                            Admission Date   2/12/24    Admission Type   Urgent    Admitting Provider   Pedro Rashid DO    Attending Provider   Pedro Rashid DO    Department, Room/Bed   [facility] INTENSIVE CARE UNIT, 2316/1       Discharge Date       Discharge Disposition       Discharge Destination                                 Attending Provider: Pedro Rashid DO    Allergies: Pregabalin, Gluten Meal    Isolation: None   Infection: None   Code Status: No CPR    Ht: 180.3 cm (71\")   Wt: 57.2 kg (126 lb 1.7 oz)    Admission Cmt: None   Principal Problem: AMS (altered mental status) [R41.82]                   Active Insurance as of 2/12/2024       Primary Coverage       Payor Plan Insurance Group Employer/Plan Group    ANTHEM MEDICARE REPLACEMENT ANTH MEDICARE ADVANTAGE [id number]       Payor Plan Address Payor Plan Phone Number Payor Plan Fax Number Effective Dates    [address] [phone]  1/1/2022 - None Entered    [facility] [id number]         Subscriber Name Subscriber Birth Date Member ID       DONI JAIME 1955 [id number]                     Emergency Contacts        (Rel.) Home Phone Work Phone Mobile Phone    ADDISON,JAMES (Son) -- -- [phone]    Nahum Torrez (Friend) -- -- [phone]                "

## 2024-02-13 NOTE — H&P
Critical Care History and Physical     Jersey Celaya : 1955 MRN:[id number] LOS:0 ROOM:      Reason for admission: AMS (altered mental status)     Assessment / Plan     Altered Mental Status  S/p fall at home, recurrent falls per documentation  -Patient brought in via EMS, however uncertain of who called  -Patient minimally responsive to stimuli  -Oxygenation status normal  -CT head and MRI head noted as below  -Neurosurgery consulted in ED, will see in a.m.  -IV Decadron every 6 hours  -IV Keppra for seizure prophylaxis    Rhabdomyolysis  -Patient with multiple falls per documentation  -CK 4195  -Patient does not meet SIRS criteria, do not suspect infection at this time  -Recently seen by Dr. French for syncope work-up  -Echo obtained 2024 for syncope with EF 56-60%  -Will start IVFs    Chronic:  Hypertension: Patient currently normotensive, resume home antihypertensives once clinically appropriate   COPD: Not in exacerbation, continue with oxygen supplementation as needed  CAD: Continue home aspirin once able clinically appropriate  Anxiety/depression: Resume home citalopram once clinically appropriate  Vitamin D deficiency: Resume home cyanocobalamin once clinically appropriate    Level Of Support Discussed With: Next of Kin (If No Surrogate)  Code Status (Patient has no pulse and is not breathing): No CPR (Do Not Attempt to Resuscitate)  Medical Interventions (Patient has pulse or is breathing): Full Support       Nutrition:   NPO Diet NPO Type: Strict NPO     DVT prophylaxis:  Mechanical DVT prophylaxis orders are present.      History of Present illness     Jersey Celaya is a 68 y.o. male with PMH of hypertension, COPD, CAD, anxiety, depression, cervical disc degeneration was brought to the hospital via EMS and was admitted with a principal diagnosis of AMS (altered mental status).  Information for HPI taken from chart review and discussion with patient's son Macario as patient is  "minimally responsive at time of assessment.  It is unclear who called EMS as there was no family/friends that came with the patient.  In the emergency department patient was responding only to painful stimuli.  He had no purposeful movement to his extremities.  CT head was obtained which showed \"Findings suspicious for a mass in the posterior left periventricular white matter with surrounding edema, and mass effect. There is effacement of the posterior left lateral ventricle, effacement of the left posterior cerebral sulci, and 2-3 mm rightward midline shift. Finding is indeterminate but concerning for neoplasm or abscess. MRI with and without contrast is recommended for further evaluation. 2.There appears to be slight peripheral hyperdensity of the mass, and a component of hemorrhage cannot be excluded. This does not appear typical of an acute intraparenchymal hemorrhage, and no other hemorrhage is seen at this time.  3.Opacification of the right mastoid air cells and middle ear spaces with absence of the ossicles, as before. There is also hyperdense attenuation in the judah of the right external auditory canal and posteriorly along the right mastoid. Findings could be  related to chronic otomastoiditis with possible postoperative changes on the right.  4.Paranasal sinus mucosal thickening with trace air-fluid levels which could indicate acute sinusitis\".  Per ER documentation, neurosurgery was consulted and recommended MRI in the morning, and treatment with IV Keppra loading dose and IV Decadron 10 mg every 6 hours.  Patient was in c-collar on arrival, after chart review this was applied during emergency room visit in January of this year (see below).    Speaking with patient's son Macario, he states that his father \"has been declining for over a year\".  States that he has had to bathe his father, help him eat, and dress him.  States that he has been trying to get him placed in an assisted living or establish home " "health.  On assessment patient's pupils are unequal, and he only responds to vigorous stimulation.  He is oxygenating, and an ABG did not show hypoxia or hypercapnia.  MRI was obtained which showed \"large mass centered in the left parietal white matter and extensively involving the corpus callosum crossing to the right parietal white matter with extensive surrounding signal abnormality concerning for infiltrating tumor. This is most consistent with a high-grade glioma. There is extensive intratumoral susceptibility artifact suggesting hemorrhagic component. There is significant mass effect with sulcal effacement on the left and partial effacement of the right lateral ventricle. There is some enlargement of the left temporal horn, which could suggest a trapped component. There is 4 mm rightward shift of midline structures due to mass effect. No herniation. No evidence of acute infarct or acute intracranial hemorrhage\".  Neurosurgery was contacted concerning these findings, and instructed to continue with current regimen of decadron.  Will see in am.     Upon further chart review patient has had multiple ED visits since January of this year.    01/18/2024:  Patient is a 68 y.o. male presented with multiple falls with lightheadedness and possible syncope.  Hemoglobins initially found to be low at 9.8 slightly increased MCV.  UA was obtained and found to be unremarkable.  Chest x-ray showed acute right-sided rib fracture and CT of head showed no acute intracranial process with postoperative changes of the right mastoid/middle ear as well as left mastoid middle ear effusion.  CT of C-spine ordered in the ED showed lucency of the posterior superior C1 lateral mass with radiologist noting they could not exclude a nondisplaced acute fracture in the setting of trauma. Changes of ACDF from C3-C6 were noted as well as partially calcified thyroid nodule were also noted with recommended for thyroid ultrasound.  EKG showed sinus " rhythm at 66 with PACs.  Rigid cervical collar was applied in the ED and neurosurgery was consulted who evaluated patient recommending continued cervical collar placement at all times and outpatient follow-up in 8 weeks.  TSH was assessed and found to be 2.370.  A1c 5.50, lipid panel showed total cholesterol of 76 with an LDL of 31 and HDL of 32, B12 found to be less than 150.  Iron profile showed an iron level of 33 with a TSAT of 7 and IV Ferrlecit was ordered x 1.  He will be started on B12 supplementation as well as p.o. iron with instructions to follow-up with PCP for continued monitoring.  Patient had recent visit with cardiologist who had ordered outpatient echocardiogram although given his symptoms and recent falls this was ordered while he was admitted showed an EF of 56-60%.  PT/OT were consulted with recommendations for home health occupational therapy and rolling walker which be ordered at discharge.  On the morning after presentation patient reported that he does use methamphetamine with some frequency and that his falls may be related to this use.  Urine drug screen was ordered and found to be positive for amphetamines.   evaluated patient and discussed options for cessation assistance with referrals made to [facility] for assistance with home health as well as Medicaid enrollment.  Discussed complete case and findings with patient who reports he feels ready for discharge and able to manage at home.  At this time patient is felt to be in good condition for discharge with close follow-up with his PCP as well as cardiology on an outpatient basis.  His full testing/results and plan were discussed with patient along with concerning/alarm symptoms for which to call 911/return to the ED..  All questions were answered he verbalizes his understanding and agreement.     02/01/2024:  Patient presented with intermittent lightheadedness/dizziness.  Was placed on a cardiac monitor and an IV  established, as he reported frequent falls over the last few weeks, CT of the head was obtained was negative intracranial hemorrhage no evidence of fracture CT of the cervical spine did note a C1 lateral fracture, patient was placed in a hard c-collar, spoke with Zander nurse practitioner with neurosurgery, advised hard c-collar and follow-up in 6 weeks. CBC noted no leukocytosis hemoglobin noted at 9.8 patient denies known blood loss, reviewed 2 years ago notes a hemoglobin of 12.3. CMP notes no acute renal insufficiency or electrolyte imbalance chest x-ray was negative for pneumonia. As patient presented with frequent falls he will be placed in the ED observation unit for plan for consult with PT OT, Zander with neurosurgery did evaluate the patient at bedside as well. I did discuss plan for admission and continued monitoring to which patient is given verbal understanding. Spoke with Vikas BARRY in the ED observation unit who agreed to admission.     02/05/2024:  Patient is a 68-year-old male who was sent in by caretaker due to shortness of breath. He has no complaints at this time. Denies chest pain cough fever shortness of breath or other complaint.  Chest x-ray shows no cardiomegaly fusion or infiltrate. Patient has no EKG changes. Reexamination patient has no complaints with a benign exam and normal vital signs. Will be discharged. He will have home health start visitations. Family will follow with her family physician.       ACP: Spoke with Patient son, and only RADHA Sorto who states patient is a DNR with full intervention.    Patient was seen and examined on 02/12/24 at 03:20 EST .    Subjective / Review of systems     Review of Systems   Unable to perform ROS: Mental status change        Past Medical/Surgical/Social/Family History & Allergies     Past Medical History:   Diagnosis Date    CAD (coronary artery disease)     COPD (chronic obstructive pulmonary disease)     Depression     Depression     Dizziness      Falls     Hyperlipidemia     Hypertension     Low back pain       Past Surgical History:   Procedure Laterality Date    CARDIAC CATHETERIZATION N/A 3/22/2021    Procedure: Left Heart Cath with angiogram;  Surgeon: Mack French MD;  Location: [facility] CATH INVASIVE LOCATION;  Service: Cardiovascular;  Laterality: N/A;    CARDIAC CATHETERIZATION N/A 3/22/2021    Procedure: Left ventriculography;  Surgeon: Mack French MD;  Location: [facility] CATH INVASIVE LOCATION;  Service: Cardiovascular;  Laterality: N/A;    CERVICAL SPINE SURGERY      INNER EAR SURGERY      WISDOM TOOTH EXTRACTION        Social History     Socioeconomic History    Marital status: Single   Tobacco Use    Smoking status: Every Day     Packs/day: .5     Types: Cigarettes    Smokeless tobacco: Never   Vaping Use    Vaping Use: Never used   Substance and Sexual Activity    Alcohol use: Never    Drug use: Yes     Types: Amphetamines, Marijuana     Comment: last used meth smoked 1/16/24    Sexual activity: Defer      Family History   Problem Relation Age of Onset    Cancer Mother     Hypertension Mother     Cancer Father       Allergies   Allergen Reactions    Pregabalin Swelling    Gluten Meal GI Intolerance      Social Determinants of Health     Tobacco Use: High Risk (1/18/2024)    Patient History     Smoking Tobacco Use: Every Day     Smokeless Tobacco Use: Never     Passive Exposure: Not on file   Alcohol Use: Not At Risk (1/17/2024)    AUDIT-C     Frequency of Alcohol Consumption: Never     Average Number of Drinks: Patient does not drink     Frequency of Binge Drinking: Never   Financial Resource Strain: Not on file   Food Insecurity: No Food Insecurity (1/18/2024)    Hunger Vital Sign     Worried About Running Out of Food in the Last Year: Never true     Ran Out of Food in the Last Year: Never true   Transportation Needs: No Transportation Needs (1/18/2024)    PRAPARE - Transportation     Lack of Transportation (Medical): No     Lack of  Transportation (Non-Medical): No   Physical Activity: Not on file   Stress: Not on file   Social Connections: Unknown (10/8/2023)    Family and Community Support     Help with Day-to-Day Activities: Not on file     Lonely or Isolated: Not on file   Interpersonal Safety: Unknown (2/12/2024)    Abuse Screen     Unsafe at Home or Work/School: unable to answer (comment required)     Feels Threatened by Someone?: unable to answer (comment required)     Does Anyone Keep You from Contacting Others or Doint Things Outside the Home?: unable to answer (comment required)     Physical Sign of Abuse Present: no   Depression: Not on file   Housing Stability: Not At Risk (1/18/2024)    Housing Stability     Current Living Arrangements: home     Potentially Unsafe Housing Conditions: none   Utilities: Not At Risk (1/18/2024)    [facility] Utilities     Threatened with loss of utilities: No   Health Literacy: Unknown (1/18/2024)    Education     Help with school or training?: Not on file     Preferred Language: English   Employment: Unknown (10/8/2023)    Employment     Do you want help finding or keeping work or a job?: Not on file   Disabilities: Not At Risk (1/17/2024)    Disabilities     Concentrating, Remembering, or Making Decisions Difficulty: no     Doing Errands Independently Difficulty: no        Home Medications     Prior to Admission medications    Medication Sig Start Date End Date Taking? Authorizing Provider   albuterol sulfate  (90 Base) MCG/ACT inhaler Inhale 2 puffs Every 4 (Four) Hours As Needed for Wheezing.    Naveen Angeles MD   aspirin 81 MG EC tablet Take 1 tablet by mouth Daily.    Naveen Angeles MD   atorvastatin (LIPITOR) 10 MG tablet Take 1 tablet by mouth Every Night.    Naveen Angeles MD   citalopram (CeleXA) 40 MG tablet Take 1 tablet by mouth Every Night. 7/20/04   Naveen Angeles MD   diphenoxylate-atropine (LOMOTIL) 2.5-0.025 MG per tablet Take 1 tablet by mouth 2 (Two)  Times a Day As Needed for Diarrhea.    Naveen Angeles MD   ferrous sulfate 324 MG tablet delayed-release Take 1 tablet by mouth Daily With Breakfast. 1/18/24   Pedro Blum PA-C   fluticasone (FLONASE) 50 MCG/ACT nasal spray 1 spray into the nostril(s) as directed by provider Daily. 4/3/20   Naveen Angeles MD   gabapentin (NEURONTIN) 300 MG capsule Take 1 capsule by mouth 3 (Three) Times a Day.    Naveen Angeles MD   meloxicam (MOBIC) 15 MG tablet Take 1 tablet by mouth Daily As Needed. 4/3/20   Naveen Angeles MD   metoprolol succinate XL (TOPROL-XL) 25 MG 24 hr tablet Take 1 tablet by mouth Daily. 2/2/22   Mack French MD   mirtazapine (REMERON) 15 MG tablet Take 1 tablet by mouth Every Night. 4/3/20   Naveen Angeles MD   tamsulosin (FLOMAX) 0.4 MG capsule 24 hr capsule Take 1 capsule by mouth Every Night. 4/3/20   Naveen Angeles MD   temazepam (RESTORIL) 15 MG capsule Take 1 capsule by mouth At Night As Needed for Sleep.    Naveen Angeles MD   tiZANidine (ZANAFLEX) 4 MG tablet Take 1 tablet by mouth Every Night. 4/4/20   Naveen Angeles MD   vitamin B-12 (CYANOCOBALAMIN) 1000 MCG tablet Take 1 tablet by mouth Daily. 1/18/24   Pedro Blum PA-C        Objective / Physical Exam     Vital signs:  Temp: 99.5 °F (37.5 °C)  BP: 124/62  Heart Rate: 60  Resp: 23  SpO2: 100 %  Weight: 70.3 kg (155 lb)    Admission Weight: Weight: 70.3 kg (155 lb)    Physical Exam  Constitutional:       Appearance: He is toxic-appearing.   Eyes:      Pupils: Pupils are unequal.      Right eye: Pupil is sluggish.      Left eye: Pupil is sluggish.   Neck:      Comments: C-collar in place  Cardiovascular:      Rate and Rhythm: Normal rate. Rhythm regularly irregular.   Pulmonary:      Effort: Pulmonary effort is normal.      Breath sounds: Normal breath sounds.   Abdominal:      General: Abdomen is flat.      Palpations: Abdomen is soft.   Skin:     General: Skin is warm.       Coloration: Skin is pale.   Neurological:      Comments: Minimally responsive to noxious stimuli  No purposeful movement in extremities  Pupils unequal and sluggish   Psychiatric:      Comments: Unable to assess due to unresponsiveness        Labs     Results from last 7 days   Lab Units 02/12/24 2001 02/12/24  1859   WBC 10*3/mm3  --  12.30*   HEMATOCRIT %  --  33.8*   HEMATOCRIT POC % 32*  --    PLATELETS 10*3/mm3  --  217      Results from last 7 days   Lab Units 02/12/24 2011 02/12/24 2001   SODIUM mmol/L 132*  --    POTASSIUM mmol/L 4.1  --    CHLORIDE mmol/L 98  --    CO2 mmol/L 17.0*  --    BUN mg/dL 30*  --    CREATININE mg/dL 0.80 0.86        Imaging     Chest X ray: My independent assessment showed no infiltrates or effusions; chronic rib fractures noted    EKG: My independent evaluation showed normal sinus rhythm, no ST -T changes    Current Medications     Scheduled Meds:  dexAMETHasone, 4 mg, Intravenous, Q6H  levETIRAcetam, 500 mg, Intravenous, Q12H  senna-docusate sodium, 2 tablet, Oral, BID  sodium chloride, 10 mL, Intravenous, Q12H         Continuous Infusions:        Patient continues to be critically ill, remains at risk of clinical deterioration or death and needed high complexity decision making. I have spent a total of 75 minutes providing critical care services to this patient including but not limited to: review of labs/ microbiology/imaging/medications, serial monitoring of vital signs,  review of other consultant's notes, review of events in the last 24 hrs, monitoring input/output, review of treatment plan with bedside nurse, RT and other treatment team, management of life support and nutrition needs. I also spoke with patient son about the plan of care and answered all questions.    Time spent in performing separately billable procedures and updating family is not included in the critical care time.       ATIF Bolden   Critical Care  02/13/24   03:20 EST

## 2024-02-13 NOTE — CONSULTS
Nutrition Services    Patient Name: Jersey Celaya  YOB: 1955  MRN: [id number]  Admission date: 2/12/2024    Comment:  -- Severe chronic disease related malnutrition related to chronic diseases including COPD as evidenced by severe fat/muscle loss per physical exam.  See MSA below.      CLINICAL NUTRITION ASSESSMENT      Reason for Assessment 2/13: BMI less than 19, MST of 5, Wound      H&P      Past Medical History:   Diagnosis Date    CAD (coronary artery disease)     COPD (chronic obstructive pulmonary disease)     Depression     Depression     Dizziness     Falls     Hyperlipidemia     Hypertension     Low back pain        Past Surgical History:   Procedure Laterality Date    CARDIAC CATHETERIZATION N/A 3/22/2021    Procedure: Left Heart Cath with angiogram;  Surgeon: Mack French MD;  Location:  [facility];  Service: Cardiovascular;  Laterality: N/A;    CARDIAC CATHETERIZATION N/A 3/22/2021    Procedure: Left ventriculography;  Surgeon: Mack French MD;  Location:  [facility];  Service: Cardiovascular;  Laterality: N/A;    CERVICAL SPINE SURGERY      INNER EAR SURGERY      WISDOM TOOTH EXTRACTION          Current Problems   Altered Mental Status  S/p fall at home, recurrent falls per documentation  - Neurosurgery following    Rhabdomyolysis     Chronic:  Hypertension  COPD  CAD  Anxiety/depression  Vitamin D deficiency       Encounter Information        Trending Narrative     2/13: Admitted for AMS after fall at home.  RD visited patient at bedside.  Patient did not talk.  Son reports recent good PO intakes, no weight loss 1 year, drinks Boost/Ensure.  Son confirms patient has Celiac disease.  NFPE completed, consistent with nutrition diagnosis of malnutrition using AND/ASPEN criteria. See MSA below.  Patient discussed in AM rounds.  Noted with plans for Na 145-147 range.  Noted with aggressive brain tumor.  MD to discuss palliative care with son.      "    Anthropometrics        Current Height, Weight Height: 180.3 cm (71\")  Weight: 57.2 kg (126 lb 1.7 oz) (02/13/24 0419)       Usual Body Weight (UBW) Unable to obtain from patient        Trending Weight Hx     This admission: 2/13: 126# (155# stated)             PTA: RD to follow up when re-weight obtained due to large variation     Wt Readings from Last 30 Encounters:   02/13/24 0419 57.2 kg (126 lb 1.7 oz)   02/12/24 1841 70.3 kg (155 lb)   02/05/24 1445 70.3 kg (155 lb)   01/18/24 0224 70.5 kg (155 lb 6.8 oz)   01/17/24 1525 68.9 kg (152 lb)   01/17/24 1130 69.3 kg (152 lb 12.5 oz)   01/16/24 1444 70.5 kg (155 lb 8 oz)   12/02/21 1239 73.5 kg (162 lb)   05/06/21 1200 80.7 kg (178 lb)   03/22/21 1015 83 kg (182 lb 15.7 oz)   03/19/21 1235 83.9 kg (185 lb)   06/10/20 1346 86.2 kg (190 lb)   05/20/20 1023 88 kg (194 lb)      BMI kg/m2 Body mass index is 17.59 kg/m².       Labs        Pertinent Labs    Results from last 7 days   Lab Units 02/13/24  1014 02/13/24  0433 02/12/24  2011   SODIUM mmol/L 133*  132* 132* 132*   POTASSIUM mmol/L 4.4 4.3 4.1   CHLORIDE mmol/L 99 95* 98   CO2 mmol/L 20.0* 20.0* 17.0*   BUN mg/dL 26* 29* 30*   CREATININE mg/dL 0.61* 0.72* 0.80   CALCIUM mg/dL 8.7 8.8 8.7   BILIRUBIN mg/dL  --  0.8 0.9   ALK PHOS U/L  --  199* 213*   ALT (SGPT) U/L  --  62* 50*   AST (SGOT) U/L  --  161* 132*   GLUCOSE mg/dL 116* 121* 103*     Results from last 7 days   Lab Units 02/13/24  0433   MAGNESIUM mg/dL 1.9   PHOSPHORUS mg/dL 4.2   HEMOGLOBIN g/dL 10.5*   HEMATOCRIT % 31.9*     Lab Results   Component Value Date    HGBA1C 5.50 01/17/2024        Medications    Scheduled Medications dexAMETHasone, 4 mg, Intravenous, Q6H  levETIRAcetam, 500 mg, Intravenous, Q12H  senna-docusate sodium, 2 tablet, Oral, BID  sodium chloride, 10 mL, Intravenous, Q12H        Infusions [Held by provider] sodium chloride, 50 mL/hr, Last Rate: Stopped (02/13/24 1200)        PRN Medications   senna-docusate sodium **AND** " polyethylene glycol **AND** bisacodyl **AND** bisacodyl    hydrALAZINE    nitroglycerin    ondansetron ODT **OR** ondansetron    sodium chloride    [COMPLETED] Insert Peripheral IV **AND** sodium chloride    sodium chloride    sodium chloride     Physical Findings        Trending Physical   Appearance, NFPE 2/13: NFPE completed, consistent with nutrition diagnosis of malnutrition using AND/ASPEN criteria. See MSA below.      --  Edema  No edema documented      Bowel Function Last documented BM 2/13 (today)     Tubes No feeding tube      Chewing/Swallowing Unknown baseline      Skin Left scalp area  Stage 2 to right coccyx  Stage 2 to right trochanter    No WOCN note at this time      --  Protein Requirements    EST Needs, Method, Wt used      Current Nutrition Orders & Evaluation of Intake       Oral Nutrition     Food Allergies Gluten   Current PO Diet NPO Diet NPO Type: Strict NPO   Supplement None ordered   PO Evaluation     Trending % PO Intake 2/13: NPO   --  Nutritional Risk Screening        NRS-2002 Score          Nutrition Diagnosis         Nutrition Dx Problem 1 Increased nutrient needs related to increased needs for healing as evidenced by wounds.      Nutrition Dx Problem 2 Severe chronic disease related malnutrition related to chronic diseases including COPD as evidenced by severe fat/muscle loss per physical exam.         Intervention Goal         Intervention Goal(s) Begin nutrition as able      Nutrition Intervention        RD Action Monitor for diet advancement      Nutrition Prescription          Diet Prescription NPO   Supplement Prescription    --  Monitor/Evaluation        Monitor Per protocol, I&O, Pertinent labs, Weight, Skin status, GI status, Symptoms, POC/GOC, Swallow function     Malnutrition Severity Assessment      Patient meets criteria for : Severe Malnutrition  Malnutrition Type (last 8 hours)       Malnutrition Severity Assessment       Row Name 02/13/24 5091       Malnutrition  Severity Assessment    Malnutrition Type Chronic Disease - Related Malnutrition      Row Name 02/13/24 1440       Muscle Loss    Loss of Muscle Mass Findings Severe    [place] Region Severe - deep hollowing/scooping, lack of muscle to touch, facial bones well defined    Clavicle Bone Region Severe - protruding prominent bone    Acromion Bone Region Severe - squared shoulders, bones, and acromion process protrusion prominent    Scapular Bone Region Severe - prominent bones, depressions easily visible between ribs, scapula, spine, shoulders    Dorsal Hand Region Severe - prominent depression    Patellar Region Severe - prominent bone, square looking, very little muscle definition    Anterior Thigh Region Severe - line/depression along thigh, obviously thin    Posterior Calf Region Severe - thin with very little definition/firmness      Row Name 02/13/24 1440       Fat Loss    Subcutaneous Fat Loss Findings Severe    Orbital Region  Severe - pronounced hollowness/depression, dark circles, loose saggy skin    Upper Arm Region Severe - mostly skin, very little space between folds, fingers touch    Thoracic & Lumbar Region Severe - ribs visible with prominent depressions, iliac crest very prominent      Row Name 02/13/24 1440       Criteria Met (Must meet criteria for severity in at least 2 of these categories: M Wasting, Fat Loss, Fluid, Secondary Signs, Wt. Status, Intake)    Patient meets criteria for  Severe Malnutrition                     Electronically signed by:  Janie Vo RD  02/13/24 14:32 EST

## 2024-02-14 NOTE — CONSULTS
Pt has altered mental status - Pt was unintentially combative and would not lay still while line was being placed. PICC line was placed and is functional, however, pt was moving around during the entire time.

## 2024-02-14 NOTE — NURSING NOTE
Spoke with ATIF Escobar re: ordered CT ABD/Chest. Pt very restless/agitated/combative. Order for Precedex to titrate/infuse until CT's complete

## 2024-02-14 NOTE — CONSULTS
Nutrition Services    Patient Name: Jersey Celaya  YOB: 1955  MRN: [id number]  Admission date: 2/12/2024    PROGRESS NOTE      Encounter Information: Check on for nutrition plan of care + new consult for tube feeding.  Patient discussed in AM rounds.  Noted with plans to place NG tube.  Per discussion with RN, NG tube was placed but patient removed shortly after.  Awaiting son decision as to next steps.        PO Diet: NPO Diet NPO Type: Strict NPO   PO Supplements: None ordered   PO Intake:  NPO       Current nutrition support:    Nutrition support review:        Labs (reviewed below): Hyponatremia - Na+ goal 145-147 per MD        GI Function:  Last documented BM 2/14 (today)       Nutrition Intervention Updates: If able begin, Nutren 2.0 at 20mL/hr + 10mL q 8 hr water flush        Estimated/Assessed Needs    Estimated 2/14/24   Energy Requirements    EST Needs, Method, Wt used 5738-7813 kcal/day (30-40 kcal/kg of CBW 57.7 kg)       Protein Requirements    EST Needs, Method, Wt used  g/day (1.2-2.0 g/kg of CBW 57.7 kg)       Fluid Requirements     Estimated Needs (mL/day) 1 mL/kcal, will monitor hydration status      Enteral Prescription Initial Goal:  *initial goal conservative d/t risk of RFS     Nutren 2.0 at 20mL/hr + 10mL q 8 hr water flush      End Goal:    Nutren 2.0 at 50 mL/hr + water flush per clinical picture      Calories  2200 kcals (95% upper end)    Protein  92 g (in range)    Free water  759 mL   Flushes  Will monitor hydration status      The above end goal rate is for 22 hrs/day to assume interruptions for ADLs. Water flushes adjusted based on clinical picture + Rx flushes/IV fluids          TPN Prescription      Results from last 7 days   Lab Units 02/14/24  0801 02/14/24  0551 02/14/24  0440 02/13/24  1014 02/13/24  0433 02/12/24 2011   SODIUM mmol/L 135* 136 134*   < > 132* 132*   POTASSIUM mmol/L 4.4 4.5 4.8   < > 4.3 4.1   CHLORIDE mmol/L 99 101 100   < > 95* 98    CO2 mmol/L 21.0* 23.0 21.0*   < > 20.0* 17.0*   BUN mg/dL 27* 26* 27*   < > 29* 30*   CREATININE mg/dL 0.59* 0.64* 0.60*   < > 0.72* 0.80   CALCIUM mg/dL 8.8 8.6 8.6   < > 8.8 8.7   BILIRUBIN mg/dL  --  0.7  --   --  0.8 0.9   ALK PHOS U/L  --  168*  --   --  199* 213*   ALT (SGPT) U/L  --  74*  --   --  62* 50*   AST (SGOT) U/L  --  145*  --   --  161* 132*   GLUCOSE mg/dL 128* 127* 127*   < > 121* 103*    < > = values in this interval not displayed.     Results from last 7 days   Lab Units 02/14/24  0551 02/13/24  0433   MAGNESIUM mg/dL 2.0 1.9   PHOSPHORUS mg/dL 2.9 4.2   HEMOGLOBIN g/dL 9.8* 10.5*   HEMATOCRIT % 30.0* 31.9*     COVID19   Date Value Ref Range Status   03/19/2021 Not Detected Not Detected - Ref. Range Final     Lab Results   Component Value Date    HGBA1C 5.50 01/17/2024       RD to follow up per protocol.    Electronically signed by:  Janie Vo RD  02/14/24 09:47 EST

## 2024-02-14 NOTE — CONSULTS
Hematology/Oncology Inpatient Consultation    Patient name: Jersey Celaya  : 1955  MRN: [id number]  Referring Provider: JHONNY Bridges  Reason for Consultation: Left parietal mass    Chief complaint: Altered mental status    History of present illness:    Jersey Celaya is a 68 y.o. male who presented to [facility] on 2024 with complaints of altered mental status.  Past medical history of hypertension, CAD, COPD, anxiety and depression, cervical disc degeneration.  The patient was brought in via EMS.  A CT of the head was obtained in the ED which showed findings suspicious for a mass in the posterior left periventricular white matter with surrounding edema and mass effect; effacement of the posterior left lateral ventricle and effacement of the left posterior cerebral sulcal and 2 to 3 mm rightward midline shift; indeterminate but concerning for neoplasm or abscess; slight peripheral hyperdensity of the mass and a component of hemorrhage could not be excluded.  The patient was admitted with neurosurgery consultation plan for MRI along with treatment with IV Keppra and IV Decadron.  His family was contacted by the hospitalist to provide additional information after his admission due to his mental status and was told that the patient had been declining for over a year.    The subsequent MRI was obtained and showed a large mass centered in the left parietal white matter and extensively involving the corpus callosum crossing to the right parietal white matter with extensive surrounding signal abnormality concerning for an infiltrating tumor; most consistent with a high-grade glioma.    24  Hematology/Oncology was consulted for the left periventricular mass.    He/She  has a past medical history of CAD (coronary artery disease), COPD (chronic obstructive pulmonary disease), Depression, Depression, Dizziness, Falls, Hyperlipidemia, Hypertension, and Low back pain.    PCP:  Shailesh Thapa MD    History:  Past Medical History:   Diagnosis Date    CAD (coronary artery disease)     COPD (chronic obstructive pulmonary disease)     Depression     Depression     Dizziness     Falls     Hyperlipidemia     Hypertension     Low back pain    ,   Past Surgical History:   Procedure Laterality Date    CARDIAC CATHETERIZATION N/A 3/22/2021    Procedure: Left Heart Cath with angiogram;  Surgeon: Mack French MD;  Location: [facility] CATH INVASIVE LOCATION;  Service: Cardiovascular;  Laterality: N/A;    CARDIAC CATHETERIZATION N/A 3/22/2021    Procedure: Left ventriculography;  Surgeon: Mack French MD;  Location: [facility] CATH INVASIVE LOCATION;  Service: Cardiovascular;  Laterality: N/A;    CERVICAL SPINE SURGERY      INNER EAR SURGERY      WISDOM TOOTH EXTRACTION     ,   Family History   Problem Relation Age of Onset    Cancer Mother     Hypertension Mother     Cancer Father    ,   Social History     Tobacco Use    Smoking status: Every Day     Packs/day: .5     Types: Cigarettes    Smokeless tobacco: Never   Vaping Use    Vaping Use: Never used   Substance Use Topics    Alcohol use: Never    Drug use: Yes     Types: Amphetamines, Marijuana     Comment: last used meth smoked 1/16/24   ,   Medications Prior to Admission   Medication Sig Dispense Refill Last Dose    albuterol sulfate  (90 Base) MCG/ACT inhaler Inhale 2 puffs Every 4 (Four) Hours As Needed for Wheezing.       aspirin 81 MG EC tablet Take 1 tablet by mouth Daily.       atorvastatin (LIPITOR) 10 MG tablet Take 1 tablet by mouth Every Night.       citalopram (CeleXA) 40 MG tablet Take 1 tablet by mouth Every Night.       diphenoxylate-atropine (LOMOTIL) 2.5-0.025 MG per tablet Take 1 tablet by mouth 2 (Two) Times a Day As Needed for Diarrhea.       ferrous sulfate 324 MG tablet delayed-release Take 1 tablet by mouth Daily With Breakfast. 30 tablet 0     fluticasone (FLONASE) 50 MCG/ACT nasal spray 1 spray into the nostril(s) as  "directed by provider Daily.       gabapentin (NEURONTIN) 300 MG capsule Take 1 capsule by mouth 3 (Three) Times a Day.       meloxicam (MOBIC) 15 MG tablet Take 1 tablet by mouth Daily As Needed.       metoprolol succinate XL (TOPROL-XL) 25 MG 24 hr tablet Take 1 tablet by mouth Daily. 90 tablet 3     mirtazapine (REMERON) 15 MG tablet Take 1 tablet by mouth Every Night.       tamsulosin (FLOMAX) 0.4 MG capsule 24 hr capsule Take 1 capsule by mouth Every Night.       temazepam (RESTORIL) 15 MG capsule Take 1 capsule by mouth At Night As Needed for Sleep.       tiZANidine (ZANAFLEX) 4 MG tablet Take 1 tablet by mouth Every Night.       vitamin B-12 (CYANOCOBALAMIN) 1000 MCG tablet Take 1 tablet by mouth Daily. 30 tablet 0    , Scheduled Meds:  dexAMETHasone, 4 mg, Intravenous, Q6H  levETIRAcetam, 500 mg, Intravenous, Q12H  pantoprazole, 40 mg, Intravenous, Q AM  sodium chloride, 10 mL, Intravenous, Q12H  sodium chloride, 10 mL, Intravenous, Q12H  sodium chloride, 10 mL, Intravenous, Q12H  sodium chloride, 10 mL, Intravenous, Q12H    , Continuous Infusions:  dexmedetomidine, 0.2-1.5 mcg/kg/hr  sodium chloride, 15 mL/hr, Last Rate: 15 mL/hr (02/14/24 1248)    , PRN Meds:    senna-docusate sodium **AND** polyethylene glycol **AND** bisacodyl **AND** bisacodyl    hydrALAZINE    nitroglycerin    ondansetron ODT **OR** ondansetron    sodium chloride    [COMPLETED] Insert Peripheral IV **AND** sodium chloride    sodium chloride    sodium chloride    sodium chloride    sodium chloride    sodium chloride   Allergies:  Pregabalin and Gluten meal    Subjective     ROS:  Review of Systems   Unable to perform ROS: Mental status change        Objective   Vital Signs:   /70   Pulse 106   Temp 98.9 °F (37.2 °C) (Oral)   Resp 16   Ht 180.3 cm (71\")   Wt 57.7 kg (127 lb 3.3 oz)   SpO2 97%   BMI 17.74 kg/m²     Physical Exam: (performed by MD)  Physical Exam  Constitutional:       Comments: Confused,   HENT:      Head: " Normocephalic and atraumatic.   Cardiovascular:      Rate and Rhythm: Normal rate and regular rhythm.      Pulses: Normal pulses.      Heart sounds: Normal heart sounds. No murmur heard.  Pulmonary:      Effort: Pulmonary effort is normal.      Breath sounds: Normal breath sounds.   Abdominal:      General: There is no distension.      Palpations: Abdomen is soft. There is no mass.      Tenderness: There is no abdominal tenderness.   Musculoskeletal:         General: Normal range of motion.      Cervical back: Normal range of motion and neck supple.   Skin:     General: Skin is warm.   Neurological:      Mental Status: He is disoriented.         Results Review:  Lab Results (last 48 hours)       Procedure Component Value Units Date/Time    CK [[id number]]  (Abnormal) Collected: 02/14/24 0801    Specimen: Blood Updated: 02/14/24 0912     Creatine Kinase 2,755 U/L     Basic Metabolic Panel [[id number]]  (Abnormal) Collected: 02/14/24 0801    Specimen: Blood Updated: 02/14/24 0839     Glucose 128 mg/dL      BUN 27 mg/dL      Creatinine 0.59 mg/dL      Sodium 135 mmol/L      Potassium 4.4 mmol/L      Chloride 99 mmol/L      CO2 21.0 mmol/L      Calcium 8.8 mg/dL      BUN/Creatinine Ratio 45.8     Anion Gap 15.0 mmol/L      eGFR 106.3 mL/min/1.73     Narrative:      GFR Normal >60  Chronic Kidney Disease <60  Kidney Failure <15      Osmolality, Serum [[id number]]  (Normal) Collected: 02/14/24 0801    Specimen: Blood Updated: 02/14/24 0837     Osmolality 289 mOsm/kg     CBC & Differential [[id number]]  (Abnormal) Collected: 02/14/24 0551    Specimen: Blood Updated: 02/14/24 0717    Narrative:      The following orders were created for panel order CBC & Differential.  Procedure                               Abnormality         Status                     ---------                               -----------         ------                     CBC Auto Differential[[id number]]        Abnormal            Final result                  Please view results for these tests on the individual orders.    CBC Auto Differential [[id number]]  (Abnormal) Collected: 02/14/24 0551    Specimen: Blood Updated: 02/14/24 0717     WBC 13.40 10*3/mm3      RBC 2.93 10*6/mm3      Hemoglobin 9.8 g/dL      Hematocrit 30.0 %      .5 fL      MCH 33.5 pg      MCHC 32.7 g/dL      RDW 21.6 %      RDW-SD 74.4 fl      MPV 10.5 fL      Platelets 199 10*3/mm3      Neutrophil % 93.4 %      Lymphocyte % 1.5 %      Monocyte % 5.0 %      Eosinophil % 0.0 %      Basophil % 0.1 %      Neutrophils, Absolute 12.50 10*3/mm3      Lymphocytes, Absolute 0.20 10*3/mm3      Monocytes, Absolute 0.70 10*3/mm3      Eosinophils, Absolute 0.00 10*3/mm3      Basophils, Absolute 0.00 10*3/mm3      nRBC 0.0 /100 WBC     Phosphorus [[id number]]  (Normal) Collected: 02/14/24 0551    Specimen: Blood Updated: 02/14/24 0638     Phosphorus 2.9 mg/dL     Magnesium [[id number]]  (Normal) Collected: 02/14/24 0551    Specimen: Blood Updated: 02/14/24 0637     Magnesium 2.0 mg/dL     Comprehensive Metabolic Panel [[id number]]  (Abnormal) Collected: 02/14/24 0551    Specimen: Blood Updated: 02/14/24 0637     Glucose 127 mg/dL      BUN 26 mg/dL      Creatinine 0.64 mg/dL      Sodium 136 mmol/L      Potassium 4.5 mmol/L      Chloride 101 mmol/L      CO2 23.0 mmol/L      Calcium 8.6 mg/dL      Total Protein 5.8 g/dL      Albumin 3.5 g/dL      ALT (SGPT) 74 U/L      AST (SGOT) 145 U/L      Alkaline Phosphatase 168 U/L      Total Bilirubin 0.7 mg/dL      Globulin 2.3 gm/dL      A/G Ratio 1.5 g/dL      BUN/Creatinine Ratio 40.6     Anion Gap 12.0 mmol/L      eGFR 103.8 mL/min/1.73     Narrative:      GFR Normal >60  Chronic Kidney Disease <60  Kidney Failure <15      Basic Metabolic Panel [[id number]]  (Abnormal) Collected: 02/14/24 0440    Specimen: Blood Updated: 02/14/24 0518     Glucose 127 mg/dL      BUN 27 mg/dL      Creatinine 0.60 mg/dL      Sodium 134 mmol/L      Potassium 4.8 mmol/L      Comment:  Slight hemolysis detected by analyzer. Result may be falsely elevated.        Chloride 100 mmol/L      CO2 21.0 mmol/L      Calcium 8.6 mg/dL      BUN/Creatinine Ratio 45.0     Anion Gap 13.0 mmol/L      eGFR 105.8 mL/min/1.73     Narrative:      GFR Normal >60  Chronic Kidney Disease <60  Kidney Failure <15      Osmolality, Serum [[id number]]  (Normal) Collected: 02/14/24 0440    Specimen: Blood Updated: 02/14/24 0514     Osmolality 293 mOsm/kg     Basic Metabolic Panel [[id number]]  (Abnormal) Collected: 02/14/24 0002    Specimen: Blood Updated: 02/14/24 0041     Glucose 135 mg/dL      BUN 27 mg/dL      Creatinine 0.56 mg/dL      Sodium 135 mmol/L      Potassium 4.8 mmol/L      Chloride 100 mmol/L      CO2 23.0 mmol/L      Calcium 8.9 mg/dL      BUN/Creatinine Ratio 48.2     Anion Gap 12.0 mmol/L      eGFR 108.0 mL/min/1.73     Narrative:      GFR Normal >60  Chronic Kidney Disease <60  Kidney Failure <15      Osmolality, Serum [[id number]]  (Normal) Collected: 02/14/24 0002    Specimen: Blood Updated: 02/14/24 0035     Osmolality 289 mOsm/kg     Basic Metabolic Panel [[id number]]  (Abnormal) Collected: 02/13/24 2015    Specimen: Blood Updated: 02/13/24 2045     Glucose 121 mg/dL      BUN 26 mg/dL      Creatinine 0.58 mg/dL      Sodium 134 mmol/L      Potassium 4.3 mmol/L      Chloride 100 mmol/L      CO2 23.0 mmol/L      Calcium 8.7 mg/dL      BUN/Creatinine Ratio 44.8     Anion Gap 11.0 mmol/L      eGFR 106.9 mL/min/1.73     Narrative:      GFR Normal >60  Chronic Kidney Disease <60  Kidney Failure <15      Osmolality, Serum [[id number]]  (Normal) Collected: 02/13/24 2015    Specimen: Blood Updated: 02/13/24 2039     Osmolality 288 mOsm/kg     Basic Metabolic Panel [[id number]]  (Abnormal) Collected: 02/13/24 1014    Specimen: Blood Updated: 02/13/24 1046     Glucose 116 mg/dL      BUN 26 mg/dL      Creatinine 0.61 mg/dL      Sodium 133 mmol/L      Potassium 4.4 mmol/L      Chloride 99 mmol/L      CO2 20.0 mmol/L       Calcium 8.7 mg/dL      BUN/Creatinine Ratio 42.6     Anion Gap 14.0 mmol/L      eGFR 105.3 mL/min/1.73     Narrative:      GFR Normal >60  Chronic Kidney Disease <60  Kidney Failure <15      Sodium [[id number]]  (Abnormal) Collected: 02/13/24 1014    Specimen: Blood Updated: 02/13/24 1039     Sodium 132 mmol/L     MRSA Screen, PCR (Inpatient) - Swab, Nares [[id number]]  (Normal) Collected: 02/13/24 0419    Specimen: Swab from Nares Updated: 02/13/24 0552     MRSA PCR No MRSA Detected    Narrative:      The negative predictive value of this diagnostic test is high and should only be used to consider de-escalating anti-MRSA therapy. A positive result may indicate colonization with MRSA and must be correlated clinically.    CBC & Differential [[id number]]  (Abnormal) Collected: 02/13/24 0433    Specimen: Blood Updated: 02/13/24 0458    Narrative:      The following orders were created for panel order CBC & Differential.  Procedure                               Abnormality         Status                     ---------                               -----------         ------                     CBC Auto Differential[[id number]]        Abnormal            Final result                 Please view results for these tests on the individual orders.    CBC Auto Differential [[id number]]  (Abnormal) Collected: 02/13/24 0433    Specimen: Blood Updated: 02/13/24 0458     WBC 9.90 10*3/mm3      RBC 3.13 10*6/mm3      Hemoglobin 10.5 g/dL      Hematocrit 31.9 %      .9 fL      MCH 33.6 pg      MCHC 33.0 g/dL      RDW 22.3 %      RDW-SD 77.0 fl      MPV 9.7 fL      Platelets 182 10*3/mm3      Neutrophil % 95.3 %      Lymphocyte % 2.2 %      Monocyte % 2.3 %      Eosinophil % 0.0 %      Basophil % 0.2 %      Neutrophils, Absolute 9.40 10*3/mm3      Lymphocytes, Absolute 0.20 10*3/mm3      Monocytes, Absolute 0.20 10*3/mm3      Eosinophils, Absolute 0.00 10*3/mm3      Basophils, Absolute 0.00 10*3/mm3      nRBC 0.1 /100 WBC      Magnesium [[id number]]  (Normal) Collected: 02/13/24 0433    Specimen: Blood Updated: 02/13/24 0458     Magnesium 1.9 mg/dL     Phosphorus [[id number]]  (Normal) Collected: 02/13/24 0433    Specimen: Blood Updated: 02/13/24 0458     Phosphorus 4.2 mg/dL     Comprehensive Metabolic Panel [[id number]]  (Abnormal) Collected: 02/13/24 0433    Specimen: Blood Updated: 02/13/24 0458     Glucose 121 mg/dL      BUN 29 mg/dL      Creatinine 0.72 mg/dL      Sodium 132 mmol/L      Potassium 4.3 mmol/L      Chloride 95 mmol/L      CO2 20.0 mmol/L      Calcium 8.8 mg/dL      Total Protein 6.1 g/dL      Albumin 3.8 g/dL      ALT (SGPT) 62 U/L      AST (SGOT) 161 U/L      Alkaline Phosphatase 199 U/L      Total Bilirubin 0.8 mg/dL      Globulin 2.3 gm/dL      A/G Ratio 1.7 g/dL      BUN/Creatinine Ratio 40.3     Anion Gap 17.0 mmol/L      eGFR 100.1 mL/min/1.73     Narrative:      GFR Normal >60  Chronic Kidney Disease <60  Kidney Failure <15      CK [[id number]]  (Abnormal) Collected: 02/12/24 2011    Specimen: Blood Updated: 02/12/24 2047     Creatine Kinase 4,195 U/L     Comprehensive Metabolic Panel [[id number]]  (Abnormal) Collected: 02/12/24 2011    Specimen: Blood Updated: 02/12/24 2035     Glucose 103 mg/dL      BUN 30 mg/dL      Creatinine 0.80 mg/dL      Sodium 132 mmol/L      Potassium 4.1 mmol/L      Chloride 98 mmol/L      CO2 17.0 mmol/L      Calcium 8.7 mg/dL      Total Protein 6.1 g/dL      Albumin 3.7 g/dL      ALT (SGPT) 50 U/L      AST (SGOT) 132 U/L      Alkaline Phosphatase 213 U/L      Total Bilirubin 0.9 mg/dL      Globulin 2.4 gm/dL      A/G Ratio 1.5 g/dL      BUN/Creatinine Ratio 37.5     Anion Gap 17.0 mmol/L      eGFR 97.0 mL/min/1.73     Narrative:      GFR Normal >60  Chronic Kidney Disease <60  Kidney Failure <15      BNP [[id number]]  (Normal) Collected: 02/12/24 2011    Specimen: Blood Updated: 02/12/24 2035     proBNP 408.6 pg/mL     Narrative:      This assay is used as an aid in the diagnosis of  individuals suspected of having heart failure. It can be used as an aid in the diagnosis of acute decompensated heart failure (ADHF) in patients presenting with signs and symptoms of ADHF to the emergency department (ED). In addition, NT-proBNP of <300 pg/mL indicates ADHF is not likely.    Age Range Result Interpretation  NT-proBNP Concentration (pg/mL:      <50             Positive            >450                   Gray                 300-450                    Negative             <300    50-75           Positive            >900                  Gray                300-900                  Negative            <300      >75             Positive            >1800                  Gray                300-1800                  Negative            <300    Single High Sensitivity Troponin T [[id number]]  (Abnormal) Collected: 02/12/24 2011    Specimen: Blood Updated: 02/12/24 2035     HS Troponin T 37 ng/L     Narrative:      High Sensitive Troponin T Reference Range:  <14.0 ng/L- Negative Female for AMI  <22.0 ng/L- Negative Male for AMI  >=14 - Abnormal Female indicating possible myocardial injury.  >=22 - Abnormal Male indicating possible myocardial injury.   Clinicians would have to utilize clinical acumen, EKG, Troponin, and serial changes to determine if it is an Acute Myocardial Infarction or myocardial injury due to an underlying chronic condition.         Urinalysis, Microscopic Only - Straight Cath [[id number]]  (Abnormal) Collected: 02/12/24 1931    Specimen: Urine from Straight Cath Updated: 02/12/24 2006     RBC, UA 11-20 /HPF      WBC, UA 3-5 /HPF      Comment: Urine culture not indicated.        Bacteria, UA None Seen /HPF      Squamous Epithelial Cells, UA 0-2 /HPF      Transitional Epithelial Cells, UA 0-2 /HPF      Hyaline Casts, UA 0-2 /LPF      Methodology Manual Light Microscopy    Urine Drug Screen - Straight Cath [[id number]]  (Normal) Collected: 02/12/24 1931    Specimen: Urine from Straight Cath  Updated: 02/12/24 2006     Amphet/Methamphet, Screen Negative     Barbiturates Screen, Urine Negative     Benzodiazepine Screen, Urine Negative     Cocaine Screen, Urine Negative     Opiate Screen Negative     THC, Screen, Urine Negative     Methadone Screen, Urine Negative     Oxycodone Screen, Urine Negative    Narrative:      Negative Thresholds Per Drugs Screened:    Amphetamines                 500 ng/ml  Barbiturates                 200 ng/ml  Benzodiazepines              100 ng/ml  Cocaine                      300 ng/ml  Methadone                    300 ng/ml  Opiates                      300 ng/ml  Oxycodone                    100 ng/ml  THC                           50 ng/ml    The Normal Value for all drugs tested is negative. This report includes final unconfirmed screening results to be used for medical treatment purposes only. Unconfirmed results must not be used for non-medical purposes such as employment or legal testing. Clinical consideration should be applied to any drug of abuse test, particularly when unconfirmed results are used.          All urine drugs of abuse requests without chain of custody are for medical screening purposes only.  False positives are possible.      POCT Electrolytes +HGB +HCT [[id number]]  (Abnormal) Collected: 02/12/24 2001    Specimen: Arterial Blood Updated: 02/12/24 2003     Sodium 129 mmol/L      POC Potassium 3.9 mmol/L      Ionized Calcium 1.13 mmol/L      Comment: Serial Number: [id number]:  [id number]        Glucose 97 mg/dL      Hematocrit 32 %      Hemoglobin 10.9 g/dL     POC Lactate [[id number]]  (Normal) Collected: 02/12/24 2001    Specimen: Arterial Blood Updated: 02/12/24 2003     Lactate 1.0 mmol/L      Comment: Serial Number: [id number]:  [id number]       POC Glucose Once [[id number]]  (Normal) Collected: 02/12/24 2001    Specimen: Arterial Blood Updated: 02/12/24 2003     Glucose 97 mg/dL      Comment: Serial Number: [id number]:  [id number]       POC  Creatinine [[id number]]  (Normal) Collected: 02/12/24 2001    Specimen: Arterial Blood Updated: 02/12/24 2003     Creatinine 0.86 mg/dL      Comment: Serial Number: [id number]:  [id number]        eGFR 94.9 mL/min/1.73     Blood Gas, Arterial - [[id number]]  (Abnormal) Collected: 02/12/24 2001    Specimen: Arterial Blood Updated: 02/12/24 2003     Site Right Radial     Garfield's Test Positive     pH, Arterial 7.478 pH units      pCO2, Arterial 23.1 mm Hg      pO2, Arterial 132.2 mm Hg      HCO3, Arterial 17.1 mmol/L      Base Excess, Arterial -4.9 mmol/L      Comment: Serial Number: [id number]:  [id number]        O2 Saturation, Arterial 99.3 %      Barometric Pressure for Blood Gas --     Comment: N/A        Modality Cannula     FIO2 28 %      Hemodilution No    [place] Draw [[id number]] Collected: 02/12/24 1859    Specimen: Blood Updated: 02/12/24 2000    Narrative:      The following orders were created for panel order [place] Draw.  Procedure                               Abnormality         Status                     ---------                               -----------         ------                     Green Top (Gel)[[id number]]                                                             Lavender Top[[id number]]                                     Final result               Light Blue Top[[id number]]                                   Final result                 Please view results for these tests on the individual orders.    Lavender Top [[id number]] Collected: 02/12/24 1859    Specimen: Blood Updated: 02/12/24 2000     Extra Tube hold for add-on     Comment: Auto resulted       Light Blue Top [[id number]] Collected: 02/12/24 1859    Specimen: Blood Updated: 02/12/24 2000     Extra Tube Hold for add-ons.     Comment: Auto resulted       Urinalysis With Culture If Indicated - Straight Cath [[id number]]  (Abnormal) Collected: 02/12/24 1931    Specimen: Urine from Straight Cath Updated: 02/12/24 1947     Color, UA Yellow      Appearance, UA Clear     pH, UA 5.5     Specific Gravity, UA 1.025     Glucose, UA Negative     Ketones, UA 80 mg/dL (3+)     Bilirubin, UA Negative     Blood, UA Large (3+)     Protein, UA 30 mg/dL (1+)     Leuk Esterase, UA Negative     Nitrite, UA Negative     Urobilinogen, UA 1.0 E.U./dL    Narrative:      In absence of clinical symptoms, the presence of pyuria, bacteria, and/or nitrites on the urinalysis result does not correlate with infection.    CBC & Differential [[id number]]  (Abnormal) Collected: 02/12/24 1859    Specimen: Blood Updated: 02/12/24 1947    Narrative:      The following orders were created for panel order CBC & Differential.  Procedure                               Abnormality         Status                     ---------                               -----------         ------                     CBC Auto Differential[[id number]]        Abnormal            Final result               Scan Slide[[id number]]                                                                    Please view results for these tests on the individual orders.    CBC Auto Differential [[id number]]  (Abnormal) Collected: 02/12/24 1859    Specimen: Blood Updated: 02/12/24 1947     WBC 12.30 10*3/mm3      RBC 3.17 10*6/mm3      Hemoglobin 10.5 g/dL      Hematocrit 33.8 %      .3 fL      MCH 33.1 pg      MCHC 31.1 g/dL      RDW 22.6 %      RDW-SD 84.9 fl      MPV 10.5 fL      Platelets 217 10*3/mm3      Neutrophil % 86.3 %      Lymphocyte % 4.0 %      Monocyte % 9.7 %      Eosinophil % 0.0 %      Basophil % 0.0 %      Neutrophils, Absolute 10.60 10*3/mm3      Lymphocytes, Absolute 0.50 10*3/mm3      Monocytes, Absolute 1.20 10*3/mm3      Eosinophils, Absolute 0.00 10*3/mm3      Basophils, Absolute 0.00 10*3/mm3      nRBC 0.0 /100 WBC     POC Lactate [[id number]]  (Normal) Collected: 02/12/24 1916    Specimen: Blood Updated: 02/12/24 1918     Lactate 1.4 mmol/L      Comment: Serial Number: [id number]:  [id number]                 Pending Results:     Imaging Reviewed:   XR Abdomen KUB    Result Date: 2/14/2024  Impression: Enteric tube tip in the antrum of the stomach. Electronically Signed: Louis Richards MD  2/14/2024 3:03 PM EST  Workstation ID: [id number]    XR Abdomen KUB    Result Date: 2/14/2024  Impression: Enteric tube tip in the proximal stomach. Electronically Signed: Louis Richards MD  2/14/2024 10:55 AM EST  Workstation ID: [id number]    XR Chest 1 View    Result Date: 2/14/2024  Impression: PICC line tip is at the brachiocephalic confluence. Electronically Signed: Chelly Crabtree MD  2/14/2024 9:58 AM EST  Workstation ID: [id number]    MRI Brain With & Without Contrast    Result Date: 2/13/2024  Impression: 1.Large mass centered in the left parietal white matter and extensively involving the corpus callosum crossing to the right parietal white matter with extensive surrounding signal abnormality concerning for infiltrating tumor. This is most consistent with a high-grade glioma. 2.There is extensive intratumoral susceptibility artifact suggesting hemorrhagic component. 3.There is significant mass effect with sulcal effacement on the left and partial effacement of the right lateral ventricle. There is some enlargement of the left temporal horn, which could suggest a trapped component. 4.There is 4 mm rightward shift of midline structures due to mass effect. No herniation. 5.No evidence of acute infarct or acute intracranial hemorrhage. Electronically Signed: Dieter Quinones MD  2/13/2024 1:30 AM EST  Workstation ID: [id number]    CT Head Without Contrast    Result Date: 2/12/2024  Impression: 1.Findings suspicious for a mass in the posterior left periventricular white matter with surrounding edema, and mass effect. There is effacement of the posterior left lateral ventricle, effacement of the left posterior cerebral sulci, and 2-3 mm rightward midline shift. Finding is indeterminate but concerning for neoplasm or abscess. MRI  with and without contrast is recommended for further evaluation. 2.There appears to be slight peripheral hyperdensity of the mass, and a component of hemorrhage cannot be excluded. This does not appear typical of an acute intraparenchymal hemorrhage, and no other hemorrhage is seen at this time. 3.Opacification of the right mastoid air cells and middle ear spaces with absence of the ossicles, as before. There is also hyperdense attenuation in the area of the right external auditory canal and posteriorly along the right mastoid. Findings could be  related to chronic otomastoiditis with possible postoperative changes on the right. 4.Paranasal sinus mucosal thickening with trace air-fluid levels which could indicate acute sinusitis. Results were called to the ordering provider by Dr. Louie at 2/12/2024 7:46 PM EST. Electronically Signed: Quincy Louie  2/12/2024 7:57 PM EST  Workstation ID: [id number]    CT Cervical Spine Without Contrast    Result Date: 2/12/2024  Impression: No evidence of acute fracture or traumatic subluxation of the cervical spine. Postsurgical changes from C3-C6. Stable partially calcified right thyroid lobe nodule. Correlate with ultrasound if not already performed. Electronically Signed: Emil Norwood MD  2/12/2024 7:42 PM EST  Workstation ID: [id number]    XR Chest 1 View    Result Date: 2/12/2024  1.No radiographic findings of acute cardiopulmonary abnormality. 2.Chronic fractures of the lateral right seventh and eighth ribs. Electronically Signed: Quincy Louie  2/12/2024 7:29 PM EST  Workstation ID: [id number]          Assessment & Plan   Patient is a 68-year-old male that presented due to altered mental status and recurrent falls.  Due to symptoms CT and MRI imaging of the brain was performed and the patient was found to have a large mass in the left parietal region with surrounding edema and mass effect.    Intracranial mass  7.8 x 5.9 cm left parietal mass, most consistent with high-grade  glioma  Evidence of hemorrhagic component, significant mass effect and partial effacement  On IV Decadron and Keppra  Neurosurgery following with plans for MRI to reassess on 2/16/2024  Palliative consulted, family considering home with hospice  CT chest abdomen and pelvis to evaluate for metastatic disease has been ordered and will be completed when patient is able to tolerate    Macrocytic anemia  Hemoglobin 9.8, .5  Vitamin B12 less than 150 on 1/18/2024, oral B12 on med sheet as an outpatient  Ferritin 18.83, iron sat 7% on 1/18/2024, oral iron on med sheet as an outpatient    Leukocytosis  WBC 13.40, likely reactive-IV steroids    Further recommendations per Dr. Lawson  Electronically signed by ATIF Renee, 02/14/24, 9:52 AM EST.    Patient seen and examined agree with assessment and plan    Patient is a 68-year-old male with altered mental status, chronic falls who was admitted and CT imaging with renal mass MRI brain with 7.8 cm left parietal mass consistent with high-grade glioma with evidence of hemorrhagic component significant mass effect and partial effacement patient was started on IV Decadron and Keppra.  Neurosurgery was consulted palliative consulted as well with patient's comorbidities and general poor health family is considering home with hospice.  CT chest abdomen pelvis has been ordered this is pending.  Patient was started on steroids and plan is to repeat MRI brain   Await CT imaging results and repeat MRI, this is likely high grade glioma/GBM, will discuss with neurosurgery    Thank you for this consult. We will be happy to follow along with you.     I have obtained complete history and perform physical exam along with review of labs, imaging.  I have completed the majority and substantive portion of medical decision making    Chris Lawson MD

## 2024-02-14 NOTE — PLAN OF CARE
Goal Outcome Evaluation:                                      Patient remains on room air. NS gtt. Alert to pain - does not follow commands. External cath collected some - mostly incont. episodes. Two BMs on shift. PICC placed. Restraints started.    Plan updated to continue steroids and manage rhabdo/electrolytes, then MRI on Friday to compare tumor sizes. Hospice discussed, but not started. Son updated and agreeable to plan.

## 2024-02-14 NOTE — CONSULTS
PICC Line Insertion Procedure Note    Procedure: Insertion of #5 FR/16G PICC    Indications:  Pt./DrWali Preference, Other (3%)    Active Time Out:  Correct patient: Yes  Correct procedure: Yes  Correct site: Yes  Verified with: GABO Freeman    Procedure Details:  Informed consent was obtained for the procedure.  Risk include, but are not limited to infection, air embolism, catheter tip moving, catheter blockage and phlebitis.     Maximum sterile technique was used including antiseptics, cap, gloves, gown, hand hygiene, mask, and sheet.    Ultrasound Guidance: Yes    #5 FR/16G PICC inserted to the R Upper Arm vein per hospital protocol by Vikas Brenner RN.   Non-pulsatile blood return: yes    Lot #: [id number]   Expiration date: 2024-12-31    Complications:  none    Findings:  Catheter inserted to 45 cm, with 2 cm exposed.   Mid upper arm circumference is 32 cm.   Catheter was flushed with 30 cc NS and sterile dressing applied.  Patient tolerated procedure well.  PICC tip verified by:       [x] Sapiens 3cg       [] Chest X-ray    Recommendations:  Verbal and/or written Care/Maintenance instructions provided to patient.   Primary nurse notified.    Pedro Brenner RN  02/13/24  19:42 EST

## 2024-02-14 NOTE — NURSING NOTE
Rec'd return call from ATIF Valderrama, ok to hold off on CT scans for now. D/w Geoffrey SRIVASTAVA. Order to hold off on Precedex and scans at this time

## 2024-02-14 NOTE — PROGRESS NOTES
"Critical Care Progress Note     Jersey Celaya : 1955 MRN:[id number] LOS:1  Rm: 2316/1     Principal Problem: AMS (altered mental status)     Reason for follow up: All the medical problems listed below    Summary     Jersey Celaya is a 68 y.o. male with PMH of hypertension, COPD, CAD, anxiety, depression, cervical disc degeneration was brought to the hospital via EMS and was admitted with a principal diagnosis of AMS (altered mental status).  Information for HPI taken from chart review and discussion with patient's son Macario as patient is minimally responsive at time of assessment.  It is unclear who called EMS as there was no family/friends that came with the patient.  In the emergency department patient was responding only to painful stimuli.  He had no purposeful movement to his extremities.  CT head was obtained which showed \"Findings suspicious for a mass in the posterior left periventricular white matter with surrounding edema, and mass effect. There is effacement of the posterior left lateral ventricle, effacement of the left posterior cerebral sulci, and 2-3 mm rightward midline shift. Finding is indeterminate but concerning for neoplasm or abscess. MRI with and without contrast is recommended for further evaluation. 2.There appears to be slight peripheral hyperdensity of the mass, and a component of hemorrhage cannot be excluded. This does not appear typical of an acute intraparenchymal hemorrhage, and no other hemorrhage is seen at this time.  3.Opacification of the right mastoid air cells and middle ear spaces with absence of the ossicles, as before. There is also hyperdense attenuation in the judah of the right external auditory canal and posteriorly along the right mastoid. Findings could be  related to chronic otomastoiditis with possible postoperative changes on the right.  4.Paranasal sinus mucosal thickening with trace air-fluid levels which could indicate acute sinusitis\".  Per ER " "documentation, neurosurgery was consulted and recommended MRI in the morning, and treatment with IV Keppra loading dose and IV Decadron 10 mg every 6 hours.  Patient was in c-collar on arrival, after chart review this was applied during emergency room visit in January of this year (see below).     Speaking with patient's son Macario, he states that his father \"has been declining for over a year\".  States that he has had to bathe his father, help him eat, and dress him.  States that he has been trying to get him placed in an assisted living or establish home health.  On assessment patient's pupils are unequal, and he only responds to vigorous stimulation.  He is oxygenating, and an ABG did not show hypoxia or hypercapnia.  MRI was obtained which showed \"large mass centered in the left parietal white matter and extensively involving the corpus callosum crossing to the right parietal white matter with extensive surrounding signal abnormality concerning for infiltrating tumor. This is most consistent with a high-grade glioma. There is extensive intratumoral susceptibility artifact suggesting hemorrhagic component. There is significant mass effect with sulcal effacement on the left and partial effacement of the right lateral ventricle. There is some enlargement of the left temporal horn, which could suggest a trapped component. There is 4 mm rightward shift of midline structures due to mass effect. No herniation. No evidence of acute infarct or acute intracranial hemorrhage\".  Neurosurgery was contacted concerning these findings, and instructed to continue with current regimen of decadron.  Neurosurgery evaluating patient, recommending IV steroids and hypertonic IVFs and monitoring.  Hospice consulted upon family request.  Will hold on hypertonic IVF until decision made whether patient will discharge home with hospice or continue treatment.    Significant Events     02/14/24 : Family discussed with Dr. Manuel, wants to " continue with IV steroids & 3% saline.  They will re-evaluate with neurosurgery over the next few days to see if there is improvement.  Proceed with hematology consultation.  Received several boluses of 3% hypertonic saline overnight, Na from 132-136, goal is 142-146 over next 24 hours.  Patient was deemed ineligible for GIP hospice, would have to be discharged home with hospice.  Hospice discussed with patient's family.  He remains a no CPR with full intervention.  Place NGT and start tube feeding.  Dietitian consulted.      Assessment / Plan     Acute encephalopathy  Large left parietal mass in white matter  S/p fall at home, recurrent falls per documentation  -Patient brought in via EMS, however uncertain of who called  -Patient minimally responsive to stimuli  -Oxygenation status normal  -CT head and MRI head noted.  -Neurosurgery consulted in ED.  Neurochecks as ordered.  -IV Decadron every 6 hours  -IV Keppra for seizure prophylaxis  -Hypertonic saline ordered, goal is Na of 145-150.  -Heme/Onc consult for recommendations for treatment.  -Palliative care consulted as requested, doesn't meet GIP criteria, but may discharge home with hospice.     Rhabdomyolysis  -Patient with multiple falls per documentation  -CK 4195, repeat CK this AM.  -Patient does not meet SIRS criteria, do not suspect infection at this time  -Continue IVFs.     Chronic:  Hypertension: Patient currently normotensive, resume home antihypertensive of Toprol XL once clinically appropriate   COPD: Not in exacerbation, continue with oxygen supplementation as needed  CAD: Continue home aspirin once able clinically appropriate  Anxiety/depression: Resume home citalopram & remeron once clinically appropriate  Vitamin D deficiency: Resume home cyanocobalamin once clinically appropriate  Peripheral neuropathy/RLS: Continue gabapentin, zanaflex when able to take PO medications.  Enlarged prostate: Continue Flomax when able to take PO  medications.    Disposition:  On 3% hypertonic saline, poor responsiveness, will keep in ICU due to frequent neurochecks.  Possible discharge home with hospice if treatment showing no improvement.  Hospice following.    Code status:   Level Of Support Discussed With: Next of Kin (If No Surrogate)  Code Status (Patient has no pulse and is not breathing): No CPR (Do Not Attempt to Resuscitate)  Medical Interventions (Patient has pulse or is breathing): Full Support       Nutrition:   NPO Diet NPO Type: Strict NPO   Patient isn't on Tube Feeding     DVT prophylaxis:  Mechanical DVT prophylaxis orders are present.         Subjective / Review of systems     Review of Systems   Unable to perform ROS: Mental status change        Objective / Physical Exam     Vital signs:  Temp: 97.5 °F (36.4 °C)  BP: 109/77  Heart Rate: 101  Resp: 17  SpO2: 96 %  Weight: 57.7 kg (127 lb 3.3 oz)    Admission Weight: Weight: 70.3 kg (155 lb)  Current Weight: Weight: 57.7 kg (127 lb 3.3 oz)    Input/Output in last 24 hours:    Intake/Output Summary (Last 24 hours) at 2/14/2024 0826  Last data filed at 2/13/2024 2347  Gross per 24 hour   Intake 835 ml   Output --   Net 835 ml      Net IO Since Admission: 835 mL [02/14/24 0826]     Physical Exam  Vitals and nursing note reviewed.   Constitutional:       Appearance: He is ill-appearing. He is not toxic-appearing or diaphoretic.      Comments: Awake, appears older than stated age.   HENT:      Head: Normocephalic and atraumatic.      Nose: Nose normal. No congestion.      Mouth/Throat:      Mouth: Mucous membranes are moist.      Pharynx: Oropharynx is clear. No oropharyngeal exudate.   Eyes:      General: No scleral icterus.     Extraocular Movements: Extraocular movements intact.      Conjunctiva/sclera: Conjunctivae normal.      Pupils: Pupils are equal, round, and reactive to light.   Neck:      Comments: C Collar in place.  Cardiovascular:      Rate and Rhythm: Normal rate and regular  rhythm.      Pulses: Normal pulses.      Heart sounds: Normal heart sounds. No murmur heard.  Pulmonary:      Effort: Pulmonary effort is normal. No respiratory distress.      Breath sounds: Normal breath sounds. No wheezing, rhonchi or rales.   Abdominal:      General: Bowel sounds are normal. There is no distension.      Tenderness: There is no abdominal tenderness.   Musculoskeletal:      Cervical back: Neck supple.      Right lower leg: No edema.      Left lower leg: No edema.   Skin:     General: Skin is warm and dry.      Coloration: Skin is pale.   Neurological:      General: No focal deficit present.      Comments: Non-conversant, unable to assess orientation.  Moves all extremities, does not follow commands, localizes to pain.   Psychiatric:      Comments: Anxious/restless.          Radiology and Labs     Results from last 7 days   Lab Units 02/14/24  0551 02/13/24  0433 02/12/24 2001 02/12/24  1859   WBC 10*3/mm3 13.40* 9.90  --  12.30*   HEMOGLOBIN g/dL 9.8* 10.5*  --  10.5*   HEMOGLOBIN, POC g/dL  --   --  10.9*  --    HEMATOCRIT % 30.0* 31.9*  --  33.8*   HEMATOCRIT POC %  --   --  32*  --    PLATELETS 10*3/mm3 199 182  --  217           Results from last 7 days   Lab Units 02/14/24  0551 02/14/24  0440 02/14/24  0002 02/13/24 2015 02/13/24  1014 02/13/24  0433   SODIUM mmol/L 136 134* 135* 134* 133*  132* 132*   POTASSIUM mmol/L 4.5 4.8 4.8 4.3 4.4 4.3   CHLORIDE mmol/L 101 100 100 100 99 95*   CO2 mmol/L 23.0 21.0* 23.0 23.0 20.0* 20.0*   BUN mg/dL 26* 27* 27* 26* 26* 29*   CREATININE mg/dL 0.64* 0.60* 0.56* 0.58* 0.61* 0.72*   GLUCOSE mg/dL 127* 127* 135* 121* 116* 121*   MAGNESIUM mg/dL 2.0  --   --   --   --  1.9   PHOSPHORUS mg/dL 2.9  --   --   --   --  4.2      Results from last 7 days   Lab Units 02/14/24  0551 02/13/24  0433 02/12/24 2011   ALK PHOS U/L 168* 199* 213*   AST (SGOT) U/L 145* 161* 132*   ALT (SGPT) U/L 74* 62* 50*     Results from last 7 days   Lab Units 02/12/24 2001   PH,  ARTERIAL pH units 7.478*   PCO2, ARTERIAL mm Hg 23.1*   PO2 ART mm Hg 132.2*   O2 SATURATION ART % 99.3*   FIO2 % 28   HCO3 ART mmol/L 17.1*   BASE EXCESS ART mmol/L -4.9*       MRI Brain With & Without Contrast    Result Date: 2/13/2024  Impression: 1.Large mass centered in the left parietal white matter and extensively involving the corpus callosum crossing to the right parietal white matter with extensive surrounding signal abnormality concerning for infiltrating tumor. This is most consistent with a high-grade glioma. 2.There is extensive intratumoral susceptibility artifact suggesting hemorrhagic component. 3.There is significant mass effect with sulcal effacement on the left and partial effacement of the right lateral ventricle. There is some enlargement of the left temporal horn, which could suggest a trapped component. 4.There is 4 mm rightward shift of midline structures due to mass effect. No herniation. 5.No evidence of acute infarct or acute intracranial hemorrhage. Electronically Signed: Dieter Quinones MD  2/13/2024 1:30 AM EST  Workstation ID: [id number]    CT Head Without Contrast    Result Date: 2/12/2024  Impression: 1.Findings suspicious for a mass in the posterior left periventricular white matter with surrounding edema, and mass effect. There is effacement of the posterior left lateral ventricle, effacement of the left posterior cerebral sulci, and 2-3 mm rightward midline shift. Finding is indeterminate but concerning for neoplasm or abscess. MRI with and without contrast is recommended for further evaluation. 2.There appears to be slight peripheral hyperdensity of the mass, and a component of hemorrhage cannot be excluded. This does not appear typical of an acute intraparenchymal hemorrhage, and no other hemorrhage is seen at this time. 3.Opacification of the right mastoid air cells and middle ear spaces with absence of the ossicles, as before. There is also hyperdense attenuation in the area of  the right external auditory canal and posteriorly along the right mastoid. Findings could be  related to chronic otomastoiditis with possible postoperative changes on the right. 4.Paranasal sinus mucosal thickening with trace air-fluid levels which could indicate acute sinusitis. Results were called to the ordering provider by Dr. Louie at 2/12/2024 7:46 PM EST. Electronically Signed: Quincy Louie  2/12/2024 7:57 PM EST  Workstation ID: [id number]    CT Cervical Spine Without Contrast    Result Date: 2/12/2024  Impression: No evidence of acute fracture or traumatic subluxation of the cervical spine. Postsurgical changes from C3-C6. Stable partially calcified right thyroid lobe nodule. Correlate with ultrasound if not already performed. Electronically Signed: Emil Norwood MD  2/12/2024 7:42 PM EST  Workstation ID: [id number]    XR Chest 1 View    Result Date: 2/12/2024  1.No radiographic findings of acute cardiopulmonary abnormality. 2.Chronic fractures of the lateral right seventh and eighth ribs. Electronically Signed: Quincy Louie  2/12/2024 7:29 PM EST  Workstation ID: [id number]       Current medications     Scheduled Meds:   dexAMETHasone, 4 mg, Intravenous, Q6H  levETIRAcetam, 500 mg, Intravenous, Q12H  senna-docusate sodium, 2 tablet, Oral, BID  sodium chloride, 10 mL, Intravenous, Q12H  sodium chloride, 10 mL, Intravenous, Q12H  sodium chloride, 10 mL, Intravenous, Q12H  sodium chloride, 10 mL, Intravenous, Q12H        Continuous Infusions:   sodium chloride, 30 mL/hr          Plan discussed with RN. Reviewed all other data in the last 24 hours, including but not limited to vitals, labs, microbiology, imaging and pertinent notes from other providers.        ATIF Dejesus   Critical Care  02/14/24   08:26 EST

## 2024-02-14 NOTE — CASE MANAGEMENT/SOCIAL WORK
Continued Stay Note   Mika     Patient Name: Jersey Celaya  MRN: [id number]  Today's Date: 2/14/2024    Admit Date: 2/12/2024    Plan: Patient lives alone, pending clinical course and PT/OT eval. SNF preference [facility]. Precert required. PASRR required.   Discharge Plan       Row Name 02/14/24 1330       Plan    Plan Patient lives alone, pending clinical course and PT/OT eval. SNF preference [facility]. Precert required. PASRR required.    Plan Comments DC Barrier: Hospice following (not GIP appropriate at this time), IV decadron, IVF NS 3%, 2L NC, restraints, PICC, repeat MRI Brain 2/16 (?Lymphoma), Neuro Sx following.               Expected Discharge Date and Time       Expected Discharge Date Expected Discharge Time    Feb 15, 2024               MARA Bocanegra RN  SIPS/ICU   O: [phone]  C: [phone]  [email or web address]

## 2024-02-14 NOTE — PROGRESS NOTES
[facility] NEUROSURGERY PROGRESS NOTE    PATIENT IDENTIFICATION:   Name:  Jersey Celaya      MRN:  [id number]     68 y.o.  male               CC: Altered mental status/left-sided brain mass      Subjective     Interval History: Acute confusion with some agitation overnight.  Patient appears to be moving all extremities purposefully and symmetrically.  Cervical hard collar in place.  Patient is verbal but not responding appropriately to questioning.    ROS:  Able to obtain due to altered mental status    Objective     Vital signs in last 24 hours:  Temp:  [96.2 °F (35.7 °C)-97.5 °F (36.4 °C)] 97.5 °F (36.4 °C)  Heart Rate:  [] 101  Resp:  [16-18] 17  BP: ()/(47-93) 109/77  ICP ranges-    Intake/Output this shift:  No intake/output data recorded.  EVD output-    Intake/Output last 3 shifts:  I/O last 3 completed shifts:  In: 835 [I.V.:835]  Out: -     LABS:      Latest Reference Range & Units 02/14/24 08:01   Creatine Kinase 20 - 200 U/L 2,755 (H)   Sodium 136 - 145 mmol/L 135 (L)   Potassium 3.5 - 5.2 mmol/L 4.4   Chloride 98 - 107 mmol/L 99   CO2 22.0 - 29.0 mmol/L 21.0 (L)   Anion Gap 5.0 - 15.0 mmol/L 15.0   BUN 8 - 23 mg/dL 27 (H)   Creatinine 0.76 - 1.27 mg/dL 0.59 (L)   BUN/Creatinine Ratio 7.0 - 25.0  45.8 (H)   eGFR >60.0 mL/min/1.73 106.3   Glucose 65 - 99 mg/dL 128 (H)   Calcium 8.6 - 10.5 mg/dL 8.8   Osmolality 280 - 301 mOsm/kg 289   (H): Data is abnormally high  (L): Data is abnormally low    IMAGING STUDIES:  No new imaging        Meds reviewed/changed: Yes      Physical Exam:    General:   Awake, alert, oriented x1.  4 point restraints  Neck:    Cervical collar in place  CN III IV VI:   PERRL   CN VII: Facial movements are symmetric. No weakness.  Motor: Moving all extremities symmetrically with good strength.  Movement is purposeful.  Extremities:   Wearing SCD    Assessment & Plan     ASSESSMENT:      AMS (altered mental status)    Severe malnutrition  Patient is a 68-year-old male  "being followed for fairly large left-sided intracranial mass with crossover into right hemisphere and mass effect.  No significant improvement in his neurostatus.  Neurosurgery will continue to follow to assess response of steroids.  Likely GBM but lymphoma in differential.  Will plan on completing metastatic workup with CT chest abdomen pelvis.  Patient also recommended for repeat MRI brain on Friday to see assess response to steroid therapy.  Patient's sodium dropped a bit and ICU did initiate 3% recommended sodium goal.    PLAN:     Brain mass    -Continue IV Decadron  - Continue Keppra  - Sodium goal 1 45-1 50  -MRI brain with and without contrast Friday 2/16  -Awaiting hematology/oncology consult  -CT chest abdomen pelvis for metastatic workup    2.  C1 fracture  -Continue hard cervical collar at all times for C1 fracture.     3.  Rhabdomyolysis  - Management per primary    I discussed the patient's findings and my recommendations with patient and nursing staff and Dr. Manuel.       LOS: 1 day       Hilda Valle, APRN  2/14/2024  10:21 EST    \"Dictated utilizing Dragon dictation\".      "

## 2024-02-14 NOTE — PLAN OF CARE
Goal Outcome Evaluation:  Plan of Care Reviewed With: patient        Progress: improving  Outcome Evaluation: Pt agitated/confused/combative this shift. Dobhoff placed and pulled by pt. Second tube placed. TF started.Remains in restraints for safety. 3% NS gtt initated and infusing.

## 2024-02-14 NOTE — NURSING NOTE
Spoke with pt's son Macario renee: placing another Dobhoff. Would like to have another one placed.

## 2024-02-15 NOTE — PLAN OF CARE
Goal Outcome Evaluation:  Plan of Care Reviewed With: patient        Progress: improving  Outcome Evaluation: Pt remains restless this shift. Alert to self and follows some commands. Tolerating TF. MRI/CTs in AM. VSS at this time.

## 2024-02-15 NOTE — PROGRESS NOTES
[facility] NEUROSURGERY PROGRESS NOTE    PATIENT IDENTIFICATION:   Name:  Jersey Celaya      MRN:  [id number]     68 y.o.  male               CC: Altered mental status/left-sided brain mass      Subjective     Interval History: Continue d confusion.  Patient moving all extremities purposefully and symmetrically.  Does not follow commands.  Dobbhoff tube placed for tube feedings.    ROS:  Able to obtain due to altered mental status    Objective     Vital signs in last 24 hours:  Temp:  [97.5 °F (36.4 °C)-98.9 °F (37.2 °C)] 98.9 °F (37.2 °C)  Heart Rate:  [] 84  Resp:  [16-21] 17  BP: ()/() 111/48  ICP ranges-    Intake/Output this shift:  No intake/output data recorded.  EVD output-    Intake/Output last 3 shifts:  I/O last 3 completed shifts:  In: 1465 [I.V.:1465]  Out: 900 [Urine:900]    LABS:      Latest Reference Range & Units 02/14/24 08:01   Creatine Kinase 20 - 200 U/L 2,755 (H)   Sodium 136 - 145 mmol/L 135 (L)   Potassium 3.5 - 5.2 mmol/L 4.4   Chloride 98 - 107 mmol/L 99   CO2 22.0 - 29.0 mmol/L 21.0 (L)   Anion Gap 5.0 - 15.0 mmol/L 15.0   BUN 8 - 23 mg/dL 27 (H)   Creatinine 0.76 - 1.27 mg/dL 0.59 (L)   BUN/Creatinine Ratio 7.0 - 25.0  45.8 (H)   eGFR >60.0 mL/min/1.73 106.3   Glucose 65 - 99 mg/dL 128 (H)   Calcium 8.6 - 10.5 mg/dL 8.8   Osmolality 280 - 301 mOsm/kg 289   (H): Data is abnormally high  (L): Data is abnormally low    IMAGING STUDIES:  No new imaging        Meds reviewed/changed: Yes      Physical Exam:    General:   Awake, alert, oriented x1.  4 point restraints  Neck:    Cervical collar in place  CN III IV VI:   PERRL   CN VII: Facial movements are symmetric. No weakness.  Motor: Moving all extremities symmetrically with good strength.  Movement is purposeful.  Extremities:   Wearing SCD    Assessment & Plan     ASSESSMENT:      AMS (altered mental status)    Severe malnutrition  Patient is a 68-year-old male being followed for fairly large left-sided intracranial mass  "with crossover into right hemisphere and mass effect.  Patient has had no acute neurologic decline but he continues to be confused with no significant improvement in his confusion or agitation.  Metastatic workup still pending as patient was not suitable for scanning yesterday due to agitation.  MRI ordered for tomorrow to reassess response to steroid therapy.      PLAN:     Brain mass    -Hematology/oncology following  -Continue IV Decadron  - Continue Keppra  - Sodium goal 1 45-1 50  -MRI brain with and without contrast Friday 2/16  -CT chest abdomen pelvis for metastatic workup pending    2.  C1 fracture  After review of imaging that was completed most recently I do not believe that the patient has a C1 lateral mass fracture.  He does not have significant pain with mobility of his neck.  I think it is reasonable for him to come out of the cervical collar at this juncture.      3.  Rhabdomyolysis  - Management per primary    I discussed the patient's findings and my recommendations with patient and nursing staff and Dr. Manuel.       LOS: 2 days       Hilda Valle, APRN  2/15/2024  07:55 EST    \"Dictated utilizing Dragon dictation\".      "

## 2024-02-15 NOTE — PROGRESS NOTES
Nutrition Services    Patient Name: Jersey Celaya  YOB: 1955  MRN: [id number]  Admission date: 2/12/2024    PROGRESS NOTE      Encounter Information: Check on for tube feeding.  Patient discussed in AM rounds.  DHT place and patient removed. A second one placed and tube feeding started.         PO Diet: NPO Diet NPO Type: Strict NPO   PO Supplements: None ordered   PO Intake:  NPO       Current nutrition support: Nutren 2.0 at 20mL/hr + 10mL q 8 hr water flush    Nutrition support review: Documented as above per EMR       Labs (reviewed below): Na+ goal 145-150 per MD        GI Function:  Last documented BM 2/14 (yesterday)  Residuals WNL       Nutrition Intervention Updates: Gradual advancement in tube feeding to goal rate of 50 mL/hour        Results from last 7 days   Lab Units 02/15/24  0827 02/15/24  0441 02/15/24  0017 02/14/24  0801 02/14/24  0551 02/13/24  1014 02/13/24  0433   SODIUM mmol/L 142 141 142   < > 136   < > 132*   POTASSIUM mmol/L 3.8 4.0 4.3   < > 4.5   < > 4.3   CHLORIDE mmol/L 108* 107 108*   < > 101   < > 95*   CO2 mmol/L 23.0 25.0 23.0   < > 23.0   < > 20.0*   BUN mg/dL 29* 29* 29*   < > 26*   < > 29*   CREATININE mg/dL 0.62* 0.67* 0.66*   < > 0.64*   < > 0.72*   CALCIUM mg/dL 8.4* 8.5* 8.6   < > 8.6   < > 8.8   BILIRUBIN mg/dL  --  0.6  --   --  0.7  --  0.8   ALK PHOS U/L  --  148*  --   --  168*  --  199*   ALT (SGPT) U/L  --  98*  --   --  74*  --  62*   AST (SGOT) U/L  --  136*  --   --  145*  --  161*   GLUCOSE mg/dL 160* 151* 153*   < > 127*   < > 121*    < > = values in this interval not displayed.     Results from last 7 days   Lab Units 02/15/24  0441 02/14/24  0551 02/13/24  0433   MAGNESIUM mg/dL 2.0 2.0 1.9   PHOSPHORUS mg/dL 2.6 2.9 4.2   HEMOGLOBIN g/dL 9.7* 9.8* 10.5*   HEMATOCRIT % 29.5* 30.0* 31.9*     COVID19   Date Value Ref Range Status   03/19/2021 Not Detected Not Detected - Ref. Range Final     Lab Results   Component Value Date    HGBA1C 5.50  01/17/2024       RD to follow up per protocol.    Electronically signed by:  Janie Vo RD  02/15/24 07:36 EST

## 2024-02-15 NOTE — PROGRESS NOTES
Hematology/Oncology Inpatient Progress Note    PATIENT NAME: Jersey Celaya  : 1955  MRN: [id number]    CHIEF COMPLAINT: Altered mental status    HISTORY OF PRESENT ILLNESS:    Jersey Celaya is a 68 y.o. male who presented to [facility] on 2024 with complaints of altered mental status.  Past medical history of hypertension, CAD, COPD, anxiety and depression, cervical disc degeneration.  The patient was brought in via EMS.  A CT of the head was obtained in the ED which showed findings suspicious for a mass in the posterior left periventricular white matter with surrounding edema and mass effect; effacement of the posterior left lateral ventricle and effacement of the left posterior cerebral sulcal and 2 to 3 mm rightward midline shift; indeterminate but concerning for neoplasm or abscess; slight peripheral hyperdensity of the mass and a component of hemorrhage could not be excluded.  The patient was admitted with neurosurgery consultation plan for MRI along with treatment with IV Keppra and IV Decadron.  His family was contacted by the hospitalist to provide additional information after his admission due to his mental status and was told that the patient had been declining for over a year.     The subsequent MRI was obtained and showed a large mass centered in the left parietal white matter and extensively involving the corpus callosum crossing to the right parietal white matter with extensive surrounding signal abnormality concerning for an infiltrating tumor; most consistent with a high-grade glioma.     24  Hematology/Oncology was consulted for the left periventricular mass.     He/She  has a past medical history of CAD (coronary artery disease), COPD (chronic obstructive pulmonary disease), Depression, Depression, Dizziness, Falls, Hyperlipidemia, Hypertension, and Low back pain.     PCP: Shailesh Thapa MD    Subjective   Less agitation as compared to yesterday, was  "oriented to self not to time.    ROS:  Review of Systems   Unable to perform ROS: Mental status change        MEDICATIONS:    Scheduled Meds:  dexAMETHasone, 4 mg, Intravenous, Q6H  levETIRAcetam, 500 mg, Intravenous, Q12H  pantoprazole, 40 mg, Intravenous, Q AM  sodium chloride, 10 mL, Intravenous, Q12H  sodium chloride, 10 mL, Intravenous, Q12H  sodium chloride, 10 mL, Intravenous, Q12H  sodium chloride, 10 mL, Intravenous, Q12H       Continuous Infusions:  sodium chloride, 20 mL/hr, Last Rate: 20 mL/hr (02/15/24 0924)       PRN Meds:    senna-docusate sodium **AND** polyethylene glycol **AND** bisacodyl **AND** bisacodyl    hydrALAZINE    nitroglycerin    ondansetron ODT **OR** ondansetron    sodium chloride    [COMPLETED] Insert Peripheral IV **AND** sodium chloride    sodium chloride    sodium chloride    sodium chloride    sodium chloride    sodium chloride     ALLERGIES:    Allergies   Allergen Reactions    Pregabalin Swelling    Gluten Meal GI Intolerance       Objective    VITALS:   /75   Pulse 69   Temp 97.8 °F (36.6 °C) (Axillary)   Resp 18   Ht 180.3 cm (71\")   Wt 55.6 kg (122 lb 9.2 oz)   SpO2 96%   BMI 17.10 kg/m²     PHYSICAL EXAM: (performed by MD)  Physical Exam  HENT:      Head: Normocephalic.      Mouth/Throat:      Mouth: Mucous membranes are moist.   Cardiovascular:      Rate and Rhythm: Normal rate.      Pulses: Normal pulses.      Heart sounds: Normal heart sounds.   Pulmonary:      Effort: Pulmonary effort is normal.   Abdominal:      General: Abdomen is flat.   Musculoskeletal:         General: Normal range of motion.   Skin:     General: Skin is warm.   Neurological:      Mental Status: He is disoriented.           RECENT LABS:  Lab Results (last 24 hours)       Procedure Component Value Units Date/Time    Osmolality, Serum [[id number]]  (Abnormal) Collected: 02/15/24 1148    Specimen: Blood Updated: 02/15/24 1211     Osmolality 307 mOsm/kg     Basic Metabolic Panel [[id number]] " Collected: 02/15/24 1148    Specimen: Blood Updated: 02/15/24 1152    Osmolality, Serum [[id number]]  (Normal) Collected: 02/15/24 0827    Specimen: Blood Updated: 02/15/24 0907     Osmolality 301 mOsm/kg     Basic Metabolic Panel [[id number]]  (Abnormal) Collected: 02/15/24 0827    Specimen: Blood Updated: 02/15/24 0857     Glucose 160 mg/dL      BUN 29 mg/dL      Creatinine 0.62 mg/dL      Sodium 142 mmol/L      Potassium 3.8 mmol/L      Comment: Slight hemolysis detected by analyzer. Result may be falsely elevated.        Chloride 108 mmol/L      CO2 23.0 mmol/L      Calcium 8.4 mg/dL      BUN/Creatinine Ratio 46.8     Anion Gap 11.0 mmol/L      eGFR 104.8 mL/min/1.73     Narrative:      GFR Normal >60  Chronic Kidney Disease <60  Kidney Failure <15      Magnesium [[id number]]  (Normal) Collected: 02/15/24 0441    Specimen: Blood Updated: 02/15/24 0533     Magnesium 2.0 mg/dL     Phosphorus [[id number]]  (Normal) Collected: 02/15/24 0441    Specimen: Blood Updated: 02/15/24 0533     Phosphorus 2.6 mg/dL     Comprehensive Metabolic Panel [[id number]]  (Abnormal) Collected: 02/15/24 0441    Specimen: Blood Updated: 02/15/24 0533     Glucose 151 mg/dL      BUN 29 mg/dL      Creatinine 0.67 mg/dL      Sodium 141 mmol/L      Potassium 4.0 mmol/L      Chloride 107 mmol/L      CO2 25.0 mmol/L      Calcium 8.5 mg/dL      Total Protein 5.7 g/dL      Albumin 3.5 g/dL      ALT (SGPT) 98 U/L      AST (SGOT) 136 U/L      Alkaline Phosphatase 148 U/L      Total Bilirubin 0.6 mg/dL      Globulin 2.2 gm/dL      A/G Ratio 1.6 g/dL      BUN/Creatinine Ratio 43.3     Anion Gap 9.0 mmol/L      eGFR 102.3 mL/min/1.73     Narrative:      GFR Normal >60  Chronic Kidney Disease <60  Kidney Failure <15      Osmolality, Serum [[id number]]  (Normal) Collected: 02/15/24 0441    Specimen: Blood Updated: 02/15/24 0516     Osmolality 301 mOsm/kg     CBC & Differential [[id number]]  (Abnormal) Collected: 02/15/24 0441    Specimen: Blood Updated:  02/15/24 0508    Narrative:      The following orders were created for panel order CBC & Differential.  Procedure                               Abnormality         Status                     ---------                               -----------         ------                     CBC Auto Differential[[id number]]        Abnormal            Final result                 Please view results for these tests on the individual orders.    CBC Auto Differential [[id number]]  (Abnormal) Collected: 02/15/24 0441    Specimen: Blood Updated: 02/15/24 0508     WBC 7.80 10*3/mm3      RBC 2.89 10*6/mm3      Hemoglobin 9.7 g/dL      Hematocrit 29.5 %      .1 fL      MCH 33.7 pg      MCHC 33.0 g/dL      RDW 21.6 %      RDW-SD 74.8 fl      MPV 10.4 fL      Platelets 207 10*3/mm3      Neutrophil % 86.0 %      Lymphocyte % 2.1 %      Monocyte % 11.7 %      Eosinophil % 0.0 %      Basophil % 0.2 %      Neutrophils, Absolute 6.70 10*3/mm3      Lymphocytes, Absolute 0.20 10*3/mm3      Monocytes, Absolute 0.90 10*3/mm3      Eosinophils, Absolute 0.00 10*3/mm3      Basophils, Absolute 0.00 10*3/mm3      nRBC 0.0 /100 WBC     Osmolality, Serum [[id number]]  (Normal) Collected: 02/15/24 0017    Specimen: Blood Updated: 02/15/24 0126     Osmolality 301 mOsm/kg     Basic Metabolic Panel [[id number]]  (Abnormal) Collected: 02/15/24 0017    Specimen: Blood Updated: 02/15/24 0051     Glucose 153 mg/dL      BUN 29 mg/dL      Creatinine 0.66 mg/dL      Sodium 142 mmol/L      Potassium 4.3 mmol/L      Comment: Slight hemolysis detected by analyzer. Result may be falsely elevated.        Chloride 108 mmol/L      CO2 23.0 mmol/L      Calcium 8.6 mg/dL      BUN/Creatinine Ratio 43.9     Anion Gap 11.0 mmol/L      eGFR 102.8 mL/min/1.73     Narrative:      GFR Normal >60  Chronic Kidney Disease <60  Kidney Failure <15      Basic Metabolic Panel [[id number]]  (Abnormal) Collected: 02/14/24 2134    Specimen: Blood Updated: 02/14/24 2211     Glucose 142  mg/dL      BUN 29 mg/dL      Creatinine 0.70 mg/dL      Sodium 139 mmol/L      Potassium 4.3 mmol/L      Comment: Slight hemolysis detected by analyzer. Result may be falsely elevated.        Chloride 104 mmol/L      CO2 22.0 mmol/L      Calcium 9.0 mg/dL      BUN/Creatinine Ratio 41.4     Anion Gap 13.0 mmol/L      eGFR 101.0 mL/min/1.73     Narrative:      GFR Normal >60  Chronic Kidney Disease <60  Kidney Failure <15      Osmolality, Serum [[id number]]  (Abnormal) Collected: 02/14/24 2134    Specimen: Blood Updated: 02/14/24 2155     Osmolality 307 mOsm/kg     Basic Metabolic Panel [[id number]]  (Abnormal) Collected: 02/14/24 1605    Specimen: Blood Updated: 02/14/24 1656     Glucose 125 mg/dL      BUN 28 mg/dL      Creatinine 0.65 mg/dL      Sodium 138 mmol/L      Potassium 4.2 mmol/L      Comment: Slight hemolysis detected by analyzer. Result may be falsely elevated.        Chloride 103 mmol/L      CO2 22.0 mmol/L      Calcium 8.8 mg/dL      BUN/Creatinine Ratio 43.1     Anion Gap 13.0 mmol/L      eGFR 103.3 mL/min/1.73     Narrative:      GFR Normal >60  Chronic Kidney Disease <60  Kidney Failure <15      Osmolality, Serum [[id number]]  (Normal) Collected: 02/14/24 1605    Specimen: Blood Updated: 02/14/24 1627     Osmolality 290 mOsm/kg     Osmolality, Serum [[id number]]  (Normal) Collected: 02/14/24 1202    Specimen: Blood Updated: 02/14/24 1255     Osmolality 295 mOsm/kg     Basic Metabolic Panel [[id number]]  (Abnormal) Collected: 02/14/24 1202    Specimen: Blood Updated: 02/14/24 1233     Glucose 129 mg/dL      BUN 27 mg/dL      Creatinine 0.65 mg/dL      Sodium 140 mmol/L      Potassium 4.6 mmol/L      Comment: Slight hemolysis detected by analyzer. Result may be falsely elevated.        Chloride 106 mmol/L      CO2 23.0 mmol/L      Calcium 9.0 mg/dL      BUN/Creatinine Ratio 41.5     Anion Gap 11.0 mmol/L      eGFR 103.3 mL/min/1.73     Narrative:      GFR Normal >60  Chronic Kidney Disease <60  Kidney  Failure <15              PENDING RESULTS:     IMAGING REVIEWED:  XR Abdomen KUB    Result Date: 2/14/2024  Impression: Enteric tube tip in the antrum of the stomach. Electronically Signed: Louis Richards MD  2/14/2024 3:03 PM EST  Workstation ID: [id number]    XR Abdomen KUB    Result Date: 2/14/2024  Impression: Enteric tube tip in the proximal stomach. Electronically Signed: Louis Richards MD  2/14/2024 10:55 AM EST  Workstation ID: [id number]    XR Chest 1 View    Result Date: 2/14/2024  Impression: PICC line tip is at the brachiocephalic confluence. Electronically Signed: Chelly Crabtree MD  2/14/2024 9:58 AM EST  Workstation ID: [id number]     Assessment & Plan   Patient is a 68-year-old male that presented due to altered mental status and recurrent falls.  Due to symptoms CT and MRI imaging of the brain was performed and the patient was found to have a large mass in the left parietal region with surrounding edema and mass effect.     Intracranial mass  7.8 x 5.9 cm left parietal mass, most consistent with high-grade glioma  Evidence of hemorrhagic component, significant mass effect and partial effacement  On IV Decadron and Keppra  Neurosurgery following with plans for MRI to reassess on 2/16/2024  Palliative consulted, family considering home with hospice  CT chest abdomen and pelvis to evaluate for metastatic disease has been ordered and will be completed when patient is able to tolerate.  Tentatively planned for 2/16/2024     Macrocytic anemia  Hemoglobin 9.7, .1  Vitamin B12 less than 150 on 1/18/2024, oral B12 on med sheet as an outpatient  Ferritin 18.83, iron sat 7% on 1/18/2024, oral iron on med sheet as an outpatient     Leukocytosis  WBC 13.40, likely reactive-IV steroids  Improved     Patient seen and examined agree with assessment plan    Patient is a 70-year-old male with altered mental status, recurrent falls CT imaging with intracranial mass MRI with a 7.8 cm left parietal mass most consistent  with a high-grade glioma with some hemorrhagic component.  Significant mass effect with partial effacement patient was started on IV Decadron and Keppra.  Neurosurgery consulted plan for repeat MRI tomorrow, repeat CT imaging has been ordered patient is unable to get that yet.  Will follow-up with results of the CT imaging and follow-up MRI.    I have obtained complete history and perform physical exam along with review of labs, imaging.  I have completed the majority and substantive portion of medical decision making    Chris Lawson MD

## 2024-02-15 NOTE — PROGRESS NOTES
"Critical Care Progress Note     Jersey Celaya : 1955 MRN:[id number] LOS:2  Rm: 2316/1     Principal Problem: AMS (altered mental status)     Reason for follow up: All the medical problems listed below    Summary     Jersey Celaya is a 68 y.o. male with PMH of hypertension, COPD, CAD, anxiety, depression, cervical disc degeneration was brought to the hospital via EMS and was admitted with a principal diagnosis of AMS (altered mental status).  Information for HPI taken from chart review and discussion with patient's son Macario as patient is minimally responsive at time of assessment.  It is unclear who called EMS as there was no family/friends that came with the patient.  In the emergency department patient was responding only to painful stimuli.  He had no purposeful movement to his extremities.  CT head was obtained which showed \"Findings suspicious for a mass in the posterior left periventricular white matter with surrounding edema, and mass effect. There is effacement of the posterior left lateral ventricle, effacement of the left posterior cerebral sulci, and 2-3 mm rightward midline shift. Finding is indeterminate but concerning for neoplasm or abscess. MRI with and without contrast is recommended for further evaluation. 2.There appears to be slight peripheral hyperdensity of the mass, and a component of hemorrhage cannot be excluded. This does not appear typical of an acute intraparenchymal hemorrhage, and no other hemorrhage is seen at this time.  3.Opacification of the right mastoid air cells and middle ear spaces with absence of the ossicles, as before. There is also hyperdense attenuation in the judah of the right external auditory canal and posteriorly along the right mastoid. Findings could be  related to chronic otomastoiditis with possible postoperative changes on the right.  4.Paranasal sinus mucosal thickening with trace air-fluid levels which could indicate acute sinusitis\".  Per ER " "documentation, neurosurgery was consulted and recommended MRI in the morning, and treatment with IV Keppra loading dose and IV Decadron 10 mg every 6 hours.  Patient was in c-collar on arrival, after chart review this was applied during emergency room visit in January of this year (see below).     Speaking with patient's son Macario, he states that his father \"has been declining for over a year\".  States that he has had to bathe his father, help him eat, and dress him.  States that he has been trying to get him placed in an assisted living or establish home health.  On assessment patient's pupils are unequal, and he only responds to vigorous stimulation.  He is oxygenating, and an ABG did not show hypoxia or hypercapnia.  MRI was obtained which showed \"large mass centered in the left parietal white matter and extensively involving the corpus callosum crossing to the right parietal white matter with extensive surrounding signal abnormality concerning for infiltrating tumor. This is most consistent with a high-grade glioma. There is extensive intratumoral susceptibility artifact suggesting hemorrhagic component. There is significant mass effect with sulcal effacement on the left and partial effacement of the right lateral ventricle. There is some enlargement of the left temporal horn, which could suggest a trapped component. There is 4 mm rightward shift of midline structures due to mass effect. No herniation. No evidence of acute infarct or acute intracranial hemorrhage\".  Neurosurgery was contacted concerning these findings, and instructed to continue with current regimen of decadron.  Neurosurgery evaluating patient, recommending IV steroids and hypertonic IVFs and monitoring.  Hospice consulted upon family request.  Will hold on hypertonic IVF until decision made whether patient will discharge home with hospice or continue treatment.    Significant Events     02/15/24 : Remains confused and yelling, following some " simple commands at times.  Per QI, continue with IV steroids & 3% saline. C-Collar discontinued by QI as imaging does not support C1 lateral fracture. Hemodynamically stable.    Patient was deemed ineligible for GIP hospice, would have to be discharged home with hospice.  Hospice discussed with patient's family.  He remains a no CPR with full intervention.  Placed NGT and start tube feeding.  Dietitian consulted.      Assessment / Plan     Acute encephalopathy  Large left parietal mass in white matter  S/p fall at home, recurrent falls per documentation  -Remains confused  -CT head and MRI head noted.  -Neurosurgery consulted in ED.  Neurochecks as ordered.  -IV Decadron every 6 hours  -IV Keppra for seizure prophylaxis  -Hypertonic saline ordered, goal is Na of 145-150.  -Heme/Onc consult for treatment recommendations. CT C/A/P pending for metastatic workup    -Palliative care consulted as requested, doesn't meet GIP criteria, but may discharge home with hospice.     Rhabdomyolysis  -Patient with multiple falls per documentation  -CK 4195, repeat CK  2755  -Patient does not meet SIRS criteria, do not suspect infection at this time  -Continue IVFs.     Chronic:  Hypertension: Patient currently normotensive, resume home antihypertensive of Toprol XL once clinically appropriate   COPD: Not in exacerbation, continue with oxygen supplementation as needed  CAD: Continue home aspirin once able clinically appropriate  Anxiety/depression: Resume home citalopram & remeron once clinically appropriate  Vitamin D deficiency: Resume home cyanocobalamin once clinically appropriate  Peripheral neuropathy/RLS: Continue gabapentin, zanaflex when able to take PO medications.  Enlarged prostate: Continue Flomax when able to take PO medications.    Disposition:  On 3% hypertonic saline, poor responsiveness, will keep in ICU due to frequent neuro checks, labs and sodium titration.  Possible discharge home with hospice if treatment  showing no improvement.  Hospice following.    Code status:   Level Of Support Discussed With: Next of Kin (If No Surrogate)  Code Status (Patient has no pulse and is not breathing): No CPR (Do Not Attempt to Resuscitate)  Medical Interventions (Patient has pulse or is breathing): Full Support       Nutrition:   NPO Diet NPO Type: Strict NPO   Diet, Tube Feeding Tube Feeding Formula: Nutren 2.0 (TwoCal); Tube Feeding Type: Continuous; Continuous Tube Feeding Start Rate (mL/hr): 30; Then Advance Rate By (mL/hr): 10; Every __ Hours: 6; To Goal Rate of (mL/hr): 50     DVT prophylaxis:  Mechanical DVT prophylaxis orders are present.         Subjective / Review of systems     Review of Systems   Unable to perform ROS: Mental status change        Objective / Physical Exam     Vital signs:  Temp: 97.8 °F (36.6 °C)  BP: 111/48  Heart Rate: 84  Resp: 17  SpO2: 97 %  Weight: 55.6 kg (122 lb 9.2 oz)    Admission Weight: Weight: 70.3 kg (155 lb)  Current Weight: Weight: 55.6 kg (122 lb 9.2 oz)    Input/Output in last 24 hours:    Intake/Output Summary (Last 24 hours) at 2/15/2024 1120  Last data filed at 2/14/2024 1800  Gross per 24 hour   Intake 630 ml   Output --   Net 630 ml      Net IO Since Admission: 565 mL [02/15/24 1120]     Physical Exam  Vitals and nursing note reviewed.   Constitutional:       Appearance: He is ill-appearing. He is not diaphoretic.      Comments: Awake, appears older than stated age. Cachectic    HENT:      Head: Normocephalic and atraumatic.      Comments: Bitemporal wasting     Nose: Nose normal.      Mouth/Throat:      Mouth: Mucous membranes are dry.   Eyes:      General: No scleral icterus.     Conjunctiva/sclera: Conjunctivae normal.      Pupils: Pupils are equal, round, and reactive to light.      Comments: Bilateral periorbital wasting   Cardiovascular:      Rate and Rhythm: Normal rate.      Heart sounds: Normal heart sounds. No murmur heard.     Comments: SR  Pulmonary:      Effort:  Pulmonary effort is normal. No respiratory distress.      Breath sounds: Normal breath sounds. No wheezing, rhonchi or rales.   Abdominal:      General: Bowel sounds are normal. There is no distension.      Tenderness: There is no guarding.   Musculoskeletal:      Cervical back: Neck supple.      Right lower leg: No edema.      Left lower leg: No edema.   Skin:     General: Skin is warm and dry.      Coloration: Skin is pale.   Neurological:      Comments: Confused, unable to assess orientation.  Moves all extremities spontaneously. intermittently follows commands.   Psychiatric:      Comments: Anxious/restless.          Radiology and Labs     Results from last 7 days   Lab Units 02/15/24  0441 02/14/24  0551 02/13/24  0433 02/12/24  2001 02/12/24  1859   WBC 10*3/mm3 7.80 13.40* 9.90  --  12.30*   HEMOGLOBIN g/dL 9.7* 9.8* 10.5*  --  10.5*   HEMOGLOBIN, POC g/dL  --   --   --  10.9*  --    HEMATOCRIT % 29.5* 30.0* 31.9*  --  33.8*   HEMATOCRIT POC %  --   --   --  32*  --    PLATELETS 10*3/mm3 207 199 182  --  217           Results from last 7 days   Lab Units 02/15/24  0827 02/15/24  0441 02/15/24  0017 02/14/24  2134 02/14/24  1605 02/14/24  0801 02/14/24  0551 02/13/24  1014 02/13/24  0433   SODIUM mmol/L 142 141 142 139 138   < > 136   < > 132*   POTASSIUM mmol/L 3.8 4.0 4.3 4.3 4.2   < > 4.5   < > 4.3   CHLORIDE mmol/L 108* 107 108* 104 103   < > 101   < > 95*   CO2 mmol/L 23.0 25.0 23.0 22.0 22.0   < > 23.0   < > 20.0*   BUN mg/dL 29* 29* 29* 29* 28*   < > 26*   < > 29*   CREATININE mg/dL 0.62* 0.67* 0.66* 0.70* 0.65*   < > 0.64*   < > 0.72*   GLUCOSE mg/dL 160* 151* 153* 142* 125*   < > 127*   < > 121*   MAGNESIUM mg/dL  --  2.0  --   --   --   --  2.0  --  1.9   PHOSPHORUS mg/dL  --  2.6  --   --   --   --  2.9  --  4.2    < > = values in this interval not displayed.      Results from last 7 days   Lab Units 02/15/24  0441 02/14/24  0551 02/13/24  0433 02/12/24 2011   ALK PHOS U/L 148* 168* 199* 213*   AST  (SGOT) U/L 136* 145* 161* 132*   ALT (SGPT) U/L 98* 74* 62* 50*     Results from last 7 days   Lab Units 02/12/24 2001   PH, ARTERIAL pH units 7.478*   PCO2, ARTERIAL mm Hg 23.1*   PO2 ART mm Hg 132.2*   O2 SATURATION ART % 99.3*   FIO2 % 28   HCO3 ART mmol/L 17.1*   BASE EXCESS ART mmol/L -4.9*       XR Abdomen KUB    Result Date: 2/14/2024  Impression: Enteric tube tip in the antrum of the stomach. Electronically Signed: Louis Richards MD  2/14/2024 3:03 PM EST  Workstation ID: [id number]    XR Abdomen KUB    Result Date: 2/14/2024  Impression: Enteric tube tip in the proximal stomach. Electronically Signed: Louis Richards MD  2/14/2024 10:55 AM EST  Workstation ID: [id number]    XR Chest 1 View    Result Date: 2/14/2024  Impression: PICC line tip is at the brachiocephalic confluence. Electronically Signed: Chelly Crabtree MD  2/14/2024 9:58 AM EST  Workstation ID: [id number]       Current medications     Scheduled Meds:   dexAMETHasone, 4 mg, Intravenous, Q6H  levETIRAcetam, 500 mg, Intravenous, Q12H  pantoprazole, 40 mg, Intravenous, Q AM  sodium chloride, 10 mL, Intravenous, Q12H  sodium chloride, 10 mL, Intravenous, Q12H  sodium chloride, 10 mL, Intravenous, Q12H  sodium chloride, 10 mL, Intravenous, Q12H        Continuous Infusions:   sodium chloride, 20 mL/hr, Last Rate: 20 mL/hr (02/15/24 0924)          Plan discussed with RN. Reviewed all other data in the last 24 hours, including but not limited to vitals, labs, microbiology, imaging and pertinent notes from other providers.        ATIF [place]   Critical Care  02/15/24   11:20 EST

## 2024-02-15 NOTE — PLAN OF CARE
Goal Outcome Evaluation:  Pt is still agitated, restless and not following commands.  Tube feeds were started yesterday, pt tolerating well. Son stayed at bedside until 0300 and brought pts hearing aids, which are now at bedside. Sodium level improving slowly.

## 2024-02-16 NOTE — CASE MANAGEMENT/SOCIAL WORK
Continued Stay Note   Mika     Patient Name: Jersey Celaya  MRN: [id number]  Today's Date: 2/16/2024    Admit Date: 2/12/2024    Plan: Patient lives alone, pending clinical course and PT/OT eval. SNF preference [facility]. Precert required. PASRR required.   Discharge Plan       Row Name 02/16/24 1718       Plan    Plan Patient lives alone, pending clinical course and PT/OT eval. SNF preference [facility]. Precert required. PASRR required.    Plan Comments DC Barrier: [facility] following (not GIP appropriate at this time), IV decadron, IVF NS 3%, 2L NC, restraints, PICC, repeat MRI Brain 2/16 (?Lymphoma), Neuro Sx following.                 Expected Discharge Date and Time       Expected Discharge Date Expected Discharge Time    Feb 19, 2024               MARA Bocanegra RN  SIPS/ICU   O: [phone]  C: [phone]  [email or web address]

## 2024-02-16 NOTE — NURSING NOTE
Spoke with JHONNY Batista. Informed unable to obtain MRI r/t pt agitated and restless. Only able to use minimal amount of Precedex before having to discontinue r/t bradycardia and hypotention. Ok to d/c 3% NS and that pt will need MRI later today or tomorrow. Sedation choice deferred to ICU team. Discussed above with Dr. Rashid. Order for one time dose of Versed 2mg to be administered prior to MRI. MRI notified that pt will need to return for imaging. Awaiting return call with time for imaging.

## 2024-02-16 NOTE — PROGRESS NOTES
Nutrition Services    Patient Name: Jersey Celaya  YOB: 1955  MRN: [id number]  Admission date: 2/12/2024    PROGRESS NOTE      Encounter Information: Check on for tube feeding.  Pt has NG in place, and Nutren 2.0 is infusing at 50mL/hour, as ordered. Minimal residual volumes.        PO Diet: NPO Diet NPO Type: Strict NPO   PO Supplements: None ordered   PO Intake:  NPO       Current nutrition support: Nutren 2.0 at 50mL/hr + 10mL q 8 hr water flush    Nutrition support review: Documented as above per EMR       Labs (reviewed below): Na+ goal 145-150 per MD yesterday; today 2/16 the new goal is 140-145 - currently at 145 mmol/L  Water flushes have been minimized and formula is maximally concentrated. Unable to reduce enteral free water further.       GI Function:  Last documented BM 2/15 (yesterday)  Residuals WNL       Nutrition Intervention Updates: Continue current TF as tolerated.       Results from last 7 days   Lab Units 02/16/24  1212 02/16/24  0810 02/16/24  0354 02/15/24  0827 02/15/24  0441 02/14/24  0801 02/14/24  0551   SODIUM mmol/L 145 148* 147*   < > 141   < > 136   POTASSIUM mmol/L 3.6 3.8 4.3   < > 4.0   < > 4.5   CHLORIDE mmol/L 110* 112* 112*   < > 107   < > 101   CO2 mmol/L 27.0 25.0 24.0   < > 25.0   < > 23.0   BUN mg/dL 28* 31* 31*   < > 29*   < > 26*   CREATININE mg/dL 0.57* 0.63* 0.63*   < > 0.67*   < > 0.64*   CALCIUM mg/dL 8.1* 8.2* 8.5*   < > 8.5*   < > 8.6   BILIRUBIN mg/dL  --   --  0.5  --  0.6  --  0.7   ALK PHOS U/L  --   --  143*  --  148*  --  168*   ALT (SGPT) U/L  --   --  96*  --  98*  --  74*   AST (SGOT) U/L  --   --  89*  --  136*  --  145*   GLUCOSE mg/dL 150* 161* 142*   < > 151*   < > 127*    < > = values in this interval not displayed.     Results from last 7 days   Lab Units 02/16/24  0810 02/16/24  0354 02/15/24  0441 02/14/24  0551   MAGNESIUM mg/dL  --  2.0 2.0 2.0   PHOSPHORUS mg/dL 3.0 2.1* 2.6 2.9   HEMOGLOBIN g/dL  --  10.6* 9.7* 9.8*    HEMATOCRIT %  --  33.4* 29.5* 30.0*     COVID19   Date Value Ref Range Status   03/19/2021 Not Detected Not Detected - Ref. Range Final     Lab Results   Component Value Date    HGBA1C 5.50 01/17/2024       RD to follow up per protocol.    Electronically signed by:  Yoli Jonas RD  02/16/24 14:46 EST

## 2024-02-16 NOTE — PROGRESS NOTES
"Critical Care Progress Note     Jersey Celaya : 1955 MRN:[id number] LOS:3  Rm: 2316/1     Principal Problem: AMS (altered mental status)     Reason for follow up: All the medical problems listed below    Summary     Jersey Celaya is a 68 y.o. male with PMH of hypertension, COPD, CAD, anxiety, depression, cervical disc degeneration was brought to the hospital via EMS and was admitted with a principal diagnosis of AMS (altered mental status).  Information for HPI taken from chart review and discussion with patient's son Macario as patient is minimally responsive at time of assessment.  It is unclear who called EMS as there was no family/friends that came with the patient.  In the emergency department patient was responding only to painful stimuli.  He had no purposeful movement to his extremities.  CT head was obtained which showed \"Findings suspicious for a mass in the posterior left periventricular white matter with surrounding edema, and mass effect. There is effacement of the posterior left lateral ventricle, effacement of the left posterior cerebral sulci, and 2-3 mm rightward midline shift. Finding is indeterminate but concerning for neoplasm or abscess. MRI with and without contrast is recommended for further evaluation. 2.There appears to be slight peripheral hyperdensity of the mass, and a component of hemorrhage cannot be excluded. This does not appear typical of an acute intraparenchymal hemorrhage, and no other hemorrhage is seen at this time.  3.Opacification of the right mastoid air cells and middle ear spaces with absence of the ossicles, as before. There is also hyperdense attenuation in the judah of the right external auditory canal and posteriorly along the right mastoid. Findings could be  related to chronic otomastoiditis with possible postoperative changes on the right.  4.Paranasal sinus mucosal thickening with trace air-fluid levels which could indicate acute sinusitis\".  Per ER " "documentation, neurosurgery was consulted and recommended MRI in the morning, and treatment with IV Keppra loading dose and IV Decadron 10 mg every 6 hours.  Patient was in c-collar on arrival, after chart review this was applied during emergency room visit in January of this year (see below).     Speaking with patient's son Macario, he states that his father \"has been declining for over a year\".  States that he has had to bathe his father, help him eat, and dress him.  States that he has been trying to get him placed in an assisted living or establish home health.  On assessment patient's pupils are unequal, and he only responds to vigorous stimulation.  He is oxygenating, and an ABG did not show hypoxia or hypercapnia.  MRI was obtained which showed \"large mass centered in the left parietal white matter and extensively involving the corpus callosum crossing to the right parietal white matter with extensive surrounding signal abnormality concerning for infiltrating tumor. This is most consistent with a high-grade glioma. There is extensive intratumoral susceptibility artifact suggesting hemorrhagic component. There is significant mass effect with sulcal effacement on the left and partial effacement of the right lateral ventricle. There is some enlargement of the left temporal horn, which could suggest a trapped component. There is 4 mm rightward shift of midline structures due to mass effect. No herniation. No evidence of acute infarct or acute intracranial hemorrhage\".  Neurosurgery was contacted concerning these findings, and instructed to continue with current regimen of decadron.  Neurosurgery evaluating patient, recommending IV steroids and hypertonic IVFs and monitoring.  Hospice consulted upon family request.  Will hold on hypertonic IVF until decision made whether patient will discharge home with hospice or continue treatment.    Significant Events     02/16/24 : Remains restless, yelling out at times.  Moves " all extremities but not following commands this morning.  Tolerating room air.  Remains on 3% saline. CT C/A/P and MRI brain pending this morning. Precedex as needed to facilitate diagnostic studies.      Of note, patient was deemed ineligible for GIP hospice, would have to be discharged home with hospice.  Hospice discussed with patient's family.  He remains a no CPR with full intervention.  Placed NGT and start tube feeding.  Dietitian consulted.      Assessment / Plan     Acute encephalopathy  Large left parietal mass in white matter  S/p fall at home, recurrent falls per documentation  -Remains confused/agitated  -CT head and MRI head noted. Repeat MRI brain this morning  -Neurosurgery consulted in ED.  Await results of MRI brain for further treatment recommendations  -IV Decadron every 6 hours  -IV Keppra for seizure prophylaxis  -Hypertonic saline infusing, goal is Na of 145-150.  -Heme/Onc consult for treatment recommendations. CT C/A/P pending for metastatic workup    -Palliative care consulted as requested, doesn't meet GIP criteria, but may discharge home with hospice.     Rhabdomyolysis  -Patient with multiple falls per documentation  -CK 4195, repeat CK  2755  -Patient does not meet SIRS criteria, do not suspect infection at this time  -Continue IVFs.     Chronic:  Hypertension: home antihypertensive of Toprol XL.  Due to current marginal hypotension restart BB at low-dose   COPD: Not in exacerbation, oxygen supplementation as needed. Tolerating room air  CAD: Continue home aspirin once able clinically appropriate  Anxiety/depression: Resume home citalopram, hold remeron for now  Vitamin D deficiency: restart home cyanocobalamin once clinically appropriate  Peripheral neuropathy/RLS: Continue gabapentin, zanaflex when appropriate. Currently held for neuro exams and wakefulness  Enlarged prostate: Continue Flomax when able to take PO medications.    Disposition:  On 3% hypertonic saline, poor  responsiveness, will keep in ICU due to frequent neuro checks, labs and sodium titration.  Possible discharge home with hospice if treatment showing no improvement.  Hospice following.    Code status:   Medical Intervention Limits: NO intubation (DNI)  Code Status (Patient has no pulse and is not breathing): No CPR (Do Not Attempt to Resuscitate)  Medical Interventions (Patient has pulse or is breathing): Limited Support       Nutrition:   NPO Diet NPO Type: Strict NPO   Diet, Tube Feeding Tube Feeding Formula: Nutren 2.0 (TwoCal); Tube Feeding Type: Continuous; Continuous Tube Feeding Start Rate (mL/hr): 30; Then Advance Rate By (mL/hr): 10; Every __ Hours: 6; To Goal Rate of (mL/hr): 50     DVT prophylaxis:  Mechanical DVT prophylaxis orders are present.         Subjective / Review of systems     Review of Systems   Unable to perform ROS: Mental status change   Agitated, not conversant     Objective / Physical Exam     Vital signs:  Temp: 98.6 °F (37 °C)  BP: 102/51  Heart Rate: 92  Resp: 19  SpO2: 95 %  Weight: 56.6 kg (124 lb 12.5 oz)    Admission Weight: Weight: 70.3 kg (155 lb)  Current Weight: Weight: 56.6 kg (124 lb 12.5 oz)    Input/Output in last 24 hours:    Intake/Output Summary (Last 24 hours) at 2/16/2024 1108  Last data filed at 2/16/2024 0400  Gross per 24 hour   Intake 1453 ml   Output --   Net 1453 ml      Net IO Since Admission: 2,018 mL [02/16/24 1108]     Physical Exam  Vitals and nursing note reviewed.   Constitutional:       Appearance: He is ill-appearing. He is not diaphoretic.      Comments: Awake, appears older than stated age. Cachectic. Agitated    HENT:      Head: Normocephalic and atraumatic.      Comments: Bitemporal wasting     Nose: Nose normal.      Mouth/Throat:      Mouth: Mucous membranes are dry.   Eyes:      General: No scleral icterus.     Conjunctiva/sclera: Conjunctivae normal.      Pupils: Pupils are equal, round, and reactive to light.      Comments: Bilateral periorbital  wasting   Cardiovascular:      Rate and Rhythm: Normal rate.      Heart sounds: Normal heart sounds. No murmur heard.     Comments: SR  Pulmonary:      Effort: Pulmonary effort is normal. No respiratory distress.      Breath sounds: Normal breath sounds. No wheezing, rhonchi or rales.   Abdominal:      General: Bowel sounds are normal. There is no distension.      Tenderness: There is no guarding.      Comments: Scaphoid    Musculoskeletal:         General: No deformity.      Cervical back: Neck supple. No rigidity.      Right lower leg: No edema.      Left lower leg: No edema.   Skin:     General: Skin is warm and dry.      Coloration: Skin is pale.   Neurological:      Comments: Confused, unable to assess orientation.  Moves all extremities spontaneously. Not following commands.   Psychiatric:      Comments: Anxious/restless.          Radiology and Labs     Results from last 7 days   Lab Units 02/16/24  0354 02/15/24  0441 02/14/24  0551 02/13/24  0433 02/12/24 2001 02/12/24  1859   WBC 10*3/mm3 8.00 7.80 13.40* 9.90  --  12.30*   HEMOGLOBIN g/dL 10.6* 9.7* 9.8* 10.5*  --  10.5*   HEMOGLOBIN, POC g/dL  --   --   --   --  10.9*  --    HEMATOCRIT % 33.4* 29.5* 30.0* 31.9*  --  33.8*   HEMATOCRIT POC %  --   --   --   --  32*  --    PLATELETS 10*3/mm3 196 207 199 182  --  217           Results from last 7 days   Lab Units 02/16/24  0810 02/16/24  0354 02/16/24  0101 02/15/24  2016 02/15/24  1608 02/15/24  0827 02/15/24  0441 02/14/24  0801 02/14/24  0551 02/13/24  1014 02/13/24  0433   SODIUM mmol/L 148* 147* 144 145 145   < > 141   < > 136   < > 132*   POTASSIUM mmol/L 3.8 4.3 3.9 3.9 4.1   < > 4.0   < > 4.5   < > 4.3   CHLORIDE mmol/L 112* 112* 110* 109* 111*   < > 107   < > 101   < > 95*   CO2 mmol/L 25.0 24.0 25.0 25.0 26.0   < > 25.0   < > 23.0   < > 20.0*   BUN mg/dL 31* 31* 31* 30* 29*   < > 29*   < > 26*   < > 29*   CREATININE mg/dL 0.63* 0.63* 0.61* 0.60* 0.71*   < > 0.67*   < > 0.64*   < > 0.72*   GLUCOSE  mg/dL 161* 142* 184* 167* 151*   < > 151*   < > 127*   < > 121*   MAGNESIUM mg/dL  --  2.0  --   --   --   --  2.0  --  2.0  --  1.9   PHOSPHORUS mg/dL 3.0 2.1*  --   --   --   --  2.6  --  2.9  --  4.2    < > = values in this interval not displayed.      Results from last 7 days   Lab Units 02/16/24  0354 02/15/24  0441 02/14/24  0551 02/13/24  0433 02/12/24 2011   ALK PHOS U/L 143* 148* 168* 199* 213*   AST (SGOT) U/L 89* 136* 145* 161* 132*   ALT (SGPT) U/L 96* 98* 74* 62* 50*     Results from last 7 days   Lab Units 02/12/24 2001   PH, ARTERIAL pH units 7.478*   PCO2, ARTERIAL mm Hg 23.1*   PO2 ART mm Hg 132.2*   O2 SATURATION ART % 99.3*   FIO2 % 28   HCO3 ART mmol/L 17.1*   BASE EXCESS ART mmol/L -4.9*       XR Abdomen KUB    Result Date: 2/14/2024  Impression: Enteric tube tip in the antrum of the stomach. Electronically Signed: Louis Richards MD  2/14/2024 3:03 PM EST  Workstation ID: [id number]       Current medications     Scheduled Meds:   citalopram, 20 mg, Nasogastric, Nightly  dexAMETHasone, 4 mg, Intravenous, Q6H  levETIRAcetam, 500 mg, Intravenous, Q12H  metoprolol tartrate, 12.5 mg, Nasogastric, Q12H  sodium chloride, 10 mL, Intravenous, Q12H        Continuous Infusions:   dexmedetomidine, 0.2-1.5 mcg/kg/hr, Last Rate: 0.4 mcg/kg/hr (02/16/24 1023)  sodium chloride, 20 mL/hr, Last Rate: Stopped (02/16/24 1032)          Plan discussed with RN. Reviewed all other data in the last 24 hours, including but not limited to vitals, labs, microbiology, imaging and pertinent notes from other providers.        ATIF [place]   Critical Care  02/16/24   11:08 EST

## 2024-02-16 NOTE — PLAN OF CARE
Goal Outcome Evaluation:  Plan of Care Reviewed With: patient        Progress: declining  Outcome Evaluation: Pt restless and d/o x4  and doesn't follow commands. Attempted MRI and CT's as ordered with Precedex. Pt BP and HR unable to tolerate. MRI aborted. CT of ABD/pelvis and chest complete. MRI will be attempted again later tonight or tomorrow with one time dose of Versed per MAR. 3% NS discontinued. VSS at this time. Did have multiple runs of VT, max of 5.

## 2024-02-16 NOTE — PROGRESS NOTES
Neurosurgery Progress Note    Date: 24     Patient: Jersey Celaya   Sex: male   : 1955      SUBJECTIVE    CC: Altered mental status    Interval history:  No adverse events overnight.  Patient continues with confusion and agitation.  Is able to tell me his name.  Does not follow commands.  Full strength in all extremities.         Current Medications:  Scheduled Meds:citalopram, 20 mg, Nasogastric, Nightly  dexAMETHasone, 4 mg, Intravenous, Q6H  levETIRAcetam, 500 mg, Intravenous, Q12H  metoprolol tartrate, 12.5 mg, Nasogastric, Q12H  midazolam, 2 mg, Intravenous, Once  potassium chloride, 40 mEq, Oral, Q4H  sodium chloride, 10 mL, Intravenous, Q12H      Continuous Infusions:dexmedetomidine, 0.2-1.5 mcg/kg/hr, Last Rate: Stopped (24 1117)      PRN Meds:   senna-docusate sodium **AND** polyethylene glycol **AND** bisacodyl **AND** bisacodyl    Calcium Replacement - Follow Nurse / BPA Driven Protocol    hydrALAZINE    Magnesium Standard Dose Replacement - Follow Nurse / BPA Driven Protocol    nitroglycerin    ondansetron ODT **OR** ondansetron    Phosphorus Replacement - Follow Nurse / BPA Driven Protocol    Potassium Replacement - Follow Nurse / BPA Driven Protocol    [COMPLETED] Insert Peripheral IV **AND** sodium chloride    sodium chloride    sodium chloride    sodium chloride    temazepam      OBJECTIVE  Vitals:    24 0832 24 0923 24 1005 24 1023   BP: 136/70 127/62 97/61 102/51   Pulse: 102 69 68 92   Resp:       Temp:       TempSrc:       SpO2:       Weight:       Height:           Physical exam    General  -  awake, slightly agitated  - knows his name  HEENT  - Normocephalic, atraumatic  - PERRLA, EOM intact  Respiratory  - Normal respiratory rate and effort  Musculoskeletal  - Range of motion and strength intact all 4 extremities  Skin  - Warm and dry, no bleeding, bruising, or rash  NEUROLOGIC  - Awake, oriented to self  - GCS: 13 (4-4-5)  - Moves all  extremities symmetrically with good strength  - Does not follow commands      Results review  CBC          2/14/2024    05:51 2/15/2024    04:41 2/16/2024    03:54   CBC   WBC 13.40  7.80  8.00    RBC 2.93  2.89  3.27    Hemoglobin 9.8  9.7  10.6    Hematocrit 30.0  29.5  33.4    .5  102.1  102.3    MCH 33.5  33.7  32.6    MCHC 32.7  33.0  31.8    RDW 21.6  21.6  21.7    Platelets 199  207  196        BMP          2/14/2024    00:02 2/14/2024    04:40 2/14/2024    05:51 2/14/2024    08:01 2/14/2024    12:02 2/14/2024    16:05 2/14/2024    21:34 2/15/2024    00:17 2/15/2024    04:41   BMP   BUN 27  27  26  27  27  28  29  29  29    Creatinine 0.56  0.60  0.64  0.59  0.65  0.65  0.70  0.66  0.67    Sodium 135  134  136  135  140  138  139  142  141    Potassium 4.8  4.8  4.5  4.4  4.6  4.2  4.3  4.3  4.0    Chloride 100  100  101  99  106  103  104  108  107    CO2 23.0  21.0  23.0  21.0  23.0  22.0  22.0  23.0  25.0    Calcium 8.9  8.6  8.6  8.8  9.0  8.8  9.0  8.6  8.5          2/15/2024    08:27 2/15/2024    11:48 2/15/2024    16:08 2/15/2024    20:16 2/16/2024    01:01 2/16/2024    03:54 2/16/2024    08:10 2/16/2024    12:12   BMP   BUN 29  30  29  30  31  31  31  28    Creatinine 0.62  0.63  0.71  0.60  0.61  0.63  0.63  0.57    Sodium 142  145  145  145  144  147  148  145    Potassium 3.8  3.9  4.1  3.9  3.9  4.3  3.8  3.6    Chloride 108  110  111  109  110  112  112  110    CO2 23.0  24.0  26.0  25.0  25.0  24.0  25.0  27.0    Calcium 8.4  8.3  8.6  8.5  8.6  8.5  8.2  8.1             CT Cervical Spine Without Contrast    Result Date: 2/12/2024  CT CERVICAL SPINE WO CONTRAST Date of Exam: 2/12/2024 7:10 PM EST Indication: fall. Comparison: CT of the cervical spine performed on January 17, 2024 Technique: Axial CT images were obtained of the cervical spine without contrast administration.  Sagittal and coronal reconstructions were performed.  Automated exposure control and iterative reconstruction  methods were used. Findings: No evidence of acute fracture or traumatic subluxation.  No evidence of aggressive lesions. The prevertebral soft tissues appear unremarkable.Surrounding soft tissues appear within normal limits. Again noted are postsurgical changes of ACDF from C3-C6. Patient is post C4 corpectomy. Osseous fusion at C6-C7 is again visualized. No CT evidence of high-grade canal stenosis. Streak artifact from hardware limits evaluation. The lung apices appear clear. Partially calcified right thyroid lobe nodule is again visualized. Correlate with ultrasound if not already performed.     Impression: Impression: No evidence of acute fracture or traumatic subluxation of the cervical spine. Postsurgical changes from C3-C6. Stable partially calcified right thyroid lobe nodule. Correlate with ultrasound if not already performed. Electronically Signed: Emil Norwood MD  2/12/2024 7:42 PM EST  Workstation ID: [id number]      CT Head Without Contrast    Result Date: 2/12/2024  CT HEAD WO CONTRAST Date of Exam: 2/12/2024 7:10 PM EST Indication: fall. Comparison: January 17, 2024 Technique: Axial CT images were obtained of the head without contrast administration.  Coronal reconstructions were performed.  Automated exposure control and iterative reconstruction methods were used. Findings: There is hypoattenuation with mass effect in the posterior left periventricular white matter in the area of the left posterior lateral ventricle. There is effacement of the posterior left lateral ventricle. Within the area of hypoattenuation there appears to be an ill-defined mass estimated to measure up to 3-4 cm in diameter. There is some peripheral hyperdense attenuation. These findings represent an interval change. There is effacement of posterior left sulci. There is estimated to be approximately 2-3 mm rightward midline shift at the level of the foramen of Garcia. No definite extra-axial collection is seen. No other definite  mass or significant edema. No significant hemorrhage is seen. The basal cisterns appear patent. There is opacification of the mastoid air cells and middle ear spaces, as before. Right ossicles are not visualized. There is hyperdense attenuation in the area of the right external auditory canal and posteriorly along the right mastoid. There is partial opacification and mucosal thickening of the ethmoid sinuses. Trace air-fluid levels are seen in the right frontal, sphenoid, and maxillary sinuses. No definite acute calvarial abnormality. There is a posterior right scalp contusion/hematoma. There also appears to be a small left frontal scalp contusion.     Impression: Impression: 1.Findings suspicious for a mass in the posterior left periventricular white matter with surrounding edema, and mass effect. There is effacement of the posterior left lateral ventricle, effacement of the left posterior cerebral sulci, and 2-3 mm rightward midline shift. Finding is indeterminate but concerning for neoplasm or abscess. MRI with and without contrast is recommended for further evaluation. 2.There appears to be slight peripheral hyperdensity of the mass, and a component of hemorrhage cannot be excluded. This does not appear typical of an acute intraparenchymal hemorrhage, and no other hemorrhage is seen at this time. 3.Opacification of the right mastoid air cells and middle ear spaces with absence of the ossicles, as before. There is also hyperdense attenuation in the area of the right external auditory canal and posteriorly along the right mastoid. Findings could be  related to chronic otomastoiditis with possible postoperative changes on the right. 4.Paranasal sinus mucosal thickening with trace air-fluid levels which could indicate acute sinusitis. Results were called to the ordering provider by Dr. Louie at 2/12/2024 7:46 PM EST. Electronically Signed: Quincy Louie  2/12/2024 7:57 PM EST  Workstation ID: [id number]   MRI Brain With  & Without Contrast    Result Date: 2/13/2024  MRI BRAIN W WO CONTRAST Date of Exam: 2/13/2024 1:01 AM EST Indication: Head trauma, skull fracture or hematoma (Age 18-64y).  Comparison: CT head 2/12/2024. Technique:  Routine multiplanar/multisequence sequence images of the brain were obtained before and after the uneventful administration of Prohance. Findings: There is redemonstration of a large mass that appears centered within the left parietal white matter. The mass involves the splenium of the corpus callosum extensively and extends into the posterior body and crosses the midline into the right parietal periventricular white matter. The solid discrete components of the mass measure up to 7.8 x 5.9 cm in the axial plane. There is also a large region of surrounding T2/FLAIR hyperintense signal that is nearly confluent in the periventricular white matter and extends into the body and genu of the corpus callosum, as well as the left frontal periventricular white matter and into the posterior aspects of both basal ganglia. Abnormal signal infiltrates the bilateral parietal and occipital white matter. The mass demonstrates an irregular discontinuous rim of enhancement and also exhibits some diffusion restriction suggesting necrosis and high cellularity. The mass also exhibits extensive susceptibility artifact suggesting tumoral hemorrhage. There is significant mass effect in both parietal lobes with sulcal effacement on the left. There is near complete effacement of the posterior aspect of the left lateral ventricle and partial effacement of the right lateral ventricle. There is some enlargement of  the left temporal horn, which could suggest a trapped component. The third and fourth ventricles appear normal size. There is approximately 4 mm rightward shift of midline structures due to mass effect. There is no evidence of an acute infarct. No herniation. There appears to be postoperative change at the right mastoid.  The left mastoid is underpneumatized. There is mild paranasal sinus mucosal disease. There is scalp edema most pronounced at the right parieto-occipital region. The orbits appear unremarkable. Cerebellum is within normal limits. There are cervical degenerative and postoperative findings.     Impression: Impression: 1.Large mass centered in the left parietal white matter and extensively involving the corpus callosum crossing to the right parietal white matter with extensive surrounding signal abnormality concerning for infiltrating tumor. This is most consistent with a high-grade glioma. 2.There is extensive intratumoral susceptibility artifact suggesting hemorrhagic component. 3.There is significant mass effect with sulcal effacement on the left and partial effacement of the right lateral ventricle. There is some enlargement of the left temporal horn, which could suggest a trapped component. 4.There is 4 mm rightward shift of midline structures due to mass effect. No herniation. 5.No evidence of acute infarct or acute intracranial hemorrhage. Electronically Signed: Dieter Quinones MD  2/13/2024 1:30 AM EST  Workstation ID: [id number]       ASSESSMENT/PLAN  This is a 68 y.o. 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  His neurologic status remains largely unchanged with generalized cognitive deficits but good sensorimotor function.    CT chest abdomen pelvis as well as repeat MRI brain with contrast should be completed today.  If MRI shows significant change secondary to steroid therapy this would indicate lymphoma and he should be treated accordingly.  Otherwise a high-grade glioma remains most likely and less evidence for metastases is found on CT.    His sodium has been well-controlled and I think it is fine to discontinue hypertonic saline.  Would continue routine sodium checks.     - CT  chest abdomen pelvis  - MRI brain with and without contrast  - Okay to discontinue hypertonic saline from neurosurgical standpoint  - Continue routine sodium checks with sodium goal 140-145  - Continue routine neurologic checks  - Continue Decadron  - Further recommendations pending completion of CT and MRI  - Call with any questions or concerns        I discussed my assessment and recommendations with Dr. Klaeb Maots PA-C  02/16/24  13:15 EST      Part of this note may be an electronic transcription/translation of spoken language to printed text using the Dragon Dictation System.

## 2024-02-17 NOTE — PROGRESS NOTES
Nutrition Services    Patient Name: Jersey Celaya  YOB: 1955  MRN: [id number]  Admission date: 2/12/2024    PROGRESS NOTE      Encounter Information: Check on for tube feeding.  Pt has NG in place, and Nutren 2.0 continues infusing at 50mL/hour, as ordered. Minimal residual volumes.        PO Diet: NPO Diet NPO Type: Strict NPO   PO Supplements: None ordered   PO Intake:  NPO       Current nutrition support: Nutren 2.0 at 50mL/hr + 10mL q 8 hr water flush    Nutrition support review: Documented as above per EMR       Labs (reviewed below): Na+ goal 140-145 per MD yesterday; currently at 145 mmol/L  Water flushes have been minimized and formula is maximally concentrated. Unable to reduce enteral free water further.       GI Function:  Last documented BM 2/15 (x2 days ago)  Residuals WNL       Nutrition Intervention Updates: Continue current TF as tolerated.       Results from last 7 days   Lab Units 02/17/24  0750 02/17/24  0601 02/17/24  0003 02/16/24  2048 02/16/24  1212 02/16/24  0810 02/16/24  0354 02/15/24  0827 02/15/24  0441   SODIUM mmol/L 145 146* 146*  --  145 148* 147*   < > 141   POTASSIUM mmol/L  --  4.2  --  4.5 3.6 3.8 4.3   < > 4.0   CHLORIDE mmol/L  --  112*  --   --  110* 112* 112*   < > 107   CO2 mmol/L  --  28.0  --   --  27.0 25.0 24.0   < > 25.0   BUN mg/dL  --  36*  --   --  28* 31* 31*   < > 29*   CREATININE mg/dL  --  0.73*  --   --  0.57* 0.63* 0.63*   < > 0.67*   CALCIUM mg/dL  --  8.6  --   --  8.1* 8.2* 8.5*   < > 8.5*   BILIRUBIN mg/dL  --  0.4  --   --   --   --  0.5  --  0.6   ALK PHOS U/L  --  121*  --   --   --   --  143*  --  148*   ALT (SGPT) U/L  --  78*  --   --   --   --  96*  --  98*   AST (SGOT) U/L  --  50*  --   --   --   --  89*  --  136*   GLUCOSE mg/dL  --  152*  --   --  150* 161* 142*   < > 151*    < > = values in this interval not displayed.     Results from last 7 days   Lab Units 02/17/24  1441 02/17/24  0601 02/16/24  0810 02/16/24  0354  02/15/24  0441   MAGNESIUM mg/dL  --  2.1  --  2.0 2.0   PHOSPHORUS mg/dL 3.6 2.1*   < > 2.1* 2.6   HEMOGLOBIN g/dL  --  11.0*  --  10.6* 9.7*   HEMATOCRIT %  --  34.8*  --  33.4* 29.5*    < > = values in this interval not displayed.     COVID19   Date Value Ref Range Status   03/19/2021 Not Detected Not Detected - Ref. Range Final     Lab Results   Component Value Date    HGBA1C 5.50 01/17/2024       RD to follow up per protocol.    Electronically signed by:  Yoli Jonas RD  02/17/24 18:55 EST

## 2024-02-17 NOTE — PROGRESS NOTES
"Critical Care Progress Note     Jersey Celaya : 1955 MRN:[id number] LOS:4  Rm: 2316/1     Principal Problem: AMS (altered mental status)     Reason for follow up: All the medical problems listed below    Summary     Jersey Celaya is a 68 y.o. male with PMH of hypertension, COPD, CAD, anxiety, depression, cervical disc degeneration was brought to the hospital via EMS and was admitted with a principal diagnosis of AMS (altered mental status).  Information for HPI taken from chart review and discussion with patient's son Macario as patient is minimally responsive at time of assessment.  It is unclear who called EMS as there was no family/friends that came with the patient.  In the emergency department patient was responding only to painful stimuli.  He had no purposeful movement to his extremities.  CT head was obtained which showed \"Findings suspicious for a mass in the posterior left periventricular white matter with surrounding edema, and mass effect. There is effacement of the posterior left lateral ventricle, effacement of the left posterior cerebral sulci, and 2-3 mm rightward midline shift. Finding is indeterminate but concerning for neoplasm or abscess. MRI with and without contrast is recommended for further evaluation. 2.There appears to be slight peripheral hyperdensity of the mass, and a component of hemorrhage cannot be excluded. This does not appear typical of an acute intraparenchymal hemorrhage, and no other hemorrhage is seen at this time.  3.Opacification of the right mastoid air cells and middle ear spaces with absence of the ossicles, as before. There is also hyperdense attenuation in the judah of the right external auditory canal and posteriorly along the right mastoid. Findings could be  related to chronic otomastoiditis with possible postoperative changes on the right.  4.Paranasal sinus mucosal thickening with trace air-fluid levels which could indicate acute sinusitis\".  Per ER " "documentation, neurosurgery was consulted and recommended MRI in the morning, and treatment with IV Keppra loading dose and IV Decadron 10 mg every 6 hours.  Patient was in c-collar on arrival, after chart review this was applied during emergency room visit in January of this year (see below).     Speaking with patient's son Macario, he states that his father \"has been declining for over a year\".  States that he has had to bathe his father, help him eat, and dress him.  States that he has been trying to get him placed in an assisted living or establish home health.  On assessment patient's pupils are unequal, and he only responds to vigorous stimulation.  He is oxygenating, and an ABG did not show hypoxia or hypercapnia.  MRI was obtained which showed \"large mass centered in the left parietal white matter and extensively involving the corpus callosum crossing to the right parietal white matter with extensive surrounding signal abnormality concerning for infiltrating tumor. This is most consistent with a high-grade glioma. There is extensive intratumoral susceptibility artifact suggesting hemorrhagic component. There is significant mass effect with sulcal effacement on the left and partial effacement of the right lateral ventricle. There is some enlargement of the left temporal horn, which could suggest a trapped component. There is 4 mm rightward shift of midline structures due to mass effect. No herniation. No evidence of acute infarct or acute intracranial hemorrhage\".  Neurosurgery was contacted concerning these findings, and instructed to continue with current regimen of decadron.  Neurosurgery evaluating patient, recommending IV steroids and hypertonic IVFs and monitoring.  Hospice consulted upon family request.  Will hold on hypertonic IVF until decision made whether patient will discharge home with hospice or continue treatment.    Significant Events     02/17/24 : Mentation waxing and waning, restless and " agitated at times. Tolerating room air.  3% saline discontinued per QI.  CT C/A/P did not reveal any obvious metastatic disease.  MRI brain has not been done due to pt agitation and movement.  Stable for transfer out of the ICU.    Of note, patient was deemed ineligible for GIP hospice, would have to be discharged home with hospice.  Hospice discussed with patient's family.  He remains DNR/DNI    Assessment / Plan     Acute encephalopathy  Large left parietal mass in white matter  S/p fall at home, recurrent falls per documentation  -Remains confused/agitated  -CT head 2/12 and MRI brain 2/13 with large mass in the left parietal  noted.   -Repeat MRI brain, has not been done due to patient agitation, movement would produce low quality diagnostic imaging  -CT C/A/P 2/16 with no obvious metastatic disease  -Neurosurgery following.  Await results of MRI brain for further treatment/surgical recommendations  -IV Decadron every 6 hours  -IV Keppra for seizure prophylaxis  -Hypertonic saline discontinued, goal Na of 145-150 has been stable.  -Heme/Onc following for treatment recommendations   -Palliative care consulted as requested, doesn't meet GIP criteria, but may discharge home with hospice.     Rhabdomyolysis  -Likely traumatic, patient with multiple falls per documentation  -CK 4195, repeat CK  2755  -Patient does not meet SIRS criteria, do not suspect infection at this time  -Continue IVFs.    Steroid induced hyperglycemia  Add SSI q6H     Chronic:  Hypertension: home antihypertensive of Toprol XL.  Due to marginal hypotension restarted BB at low-dose   COPD: Not in exacerbation, oxygen supplementation as needed. Tolerating room air  CAD: Continue home aspirin once able clinically appropriate  Anxiety/depression: Resume home citalopram, hold remeron for now  Vitamin D deficiency: restart home cyanocobalamin once clinically appropriate  Peripheral neuropathy/RLS: Continue gabapentin, zanaflex when appropriate.  Currently held for neuro exams and wakefulness  Enlarged prostate: Continue Flomax when able to take PO medications.    Disposition:  NABIL.  Possible discharge home with hospice if treatment showing no improvement.  Hospice following.    Code status:   Medical Intervention Limits: NO intubation (DNI)  Code Status (Patient has no pulse and is not breathing): No CPR (Do Not Attempt to Resuscitate)  Medical Interventions (Patient has pulse or is breathing): Limited Support       Nutrition:   NPO Diet NPO Type: Strict NPO   Diet, Tube Feeding Tube Feeding Formula: Nutren 2.0 (TwoCal); Tube Feeding Type: Continuous; Continuous Tube Feeding Start Rate (mL/hr): 30; Then Advance Rate By (mL/hr): 10; Every __ Hours: 6; To Goal Rate of (mL/hr): 50     DVT prophylaxis:  Mechanical DVT prophylaxis orders are present.         Subjective / Review of systems     Review of Systems   Unable to perform ROS: Mental status change   Nonverbal     Objective / Physical Exam     Vital signs:  Temp: 98.5 °F (36.9 °C)  BP: 126/70  Heart Rate: (!) 121  Resp: 24  SpO2: 95 %  Weight: 56.6 kg (124 lb 12.5 oz)    Admission Weight: Weight: 70.3 kg (155 lb)  Current Weight: Weight: 56.6 kg (124 lb 12.5 oz)    Input/Output in last 24 hours:    Intake/Output Summary (Last 24 hours) at 2/17/2024 0816  Last data filed at 2/17/2024 0345  Gross per 24 hour   Intake 1817 ml   Output --   Net 1817 ml      Net IO Since Admission: 3,835 mL [02/17/24 0816]     Physical Exam  Vitals and nursing note reviewed.   Constitutional:       Appearance: He is ill-appearing. He is not diaphoretic.      Comments: Lethargic at rest, agitated with stimulation.  Appears older than stated age. Cachectic and emaciated    HENT:      Head: Normocephalic and atraumatic.      Comments: Bitemporal wasting. Periorbital tissue wasting     Nose: Nose normal.      Mouth/Throat:      Mouth: Mucous membranes are dry.   Eyes:      General: No scleral icterus.     Conjunctiva/sclera:  Conjunctivae normal.      Pupils: Pupils are equal, round, and reactive to light.      Comments: Pupils dilated but equal and briskly responsive   Cardiovascular:      Heart sounds: Normal heart sounds. No murmur heard.     Comments: SR-ST  Pulmonary:      Effort: Pulmonary effort is normal. No respiratory distress.      Breath sounds: Normal breath sounds. No wheezing, rhonchi or rales.      Comments: Bases diminished  Abdominal:      General: There is no distension.      Tenderness: There is no guarding.      Comments: Scaphoid   Hypoactive bowel sounds   Musculoskeletal:         General: No deformity.      Cervical back: Neck supple. No rigidity.      Right lower leg: No edema.      Left lower leg: No edema.   Skin:     General: Skin is warm and dry.      Coloration: Skin is pale.   Neurological:      Comments: Nonverbal to conversation, unable to assess orientation. Lethargic, restless at times. Moves all extremities spontaneously. Not following commands.  W/d from noxious stimuli   Psychiatric:      Comments: Anxious/restless.          Radiology and Labs     Results from last 7 days   Lab Units 02/17/24  0601 02/16/24  0354 02/15/24  0441 02/14/24  0551 02/13/24  0433   WBC 10*3/mm3 12.20* 8.00 7.80 13.40* 9.90   HEMOGLOBIN g/dL 11.0* 10.6* 9.7* 9.8* 10.5*   HEMATOCRIT % 34.8* 33.4* 29.5* 30.0* 31.9*   PLATELETS 10*3/mm3 189 196 207 199 182           Results from last 7 days   Lab Units 02/17/24  0750 02/17/24  0601 02/17/24  0003 02/16/24  2048 02/16/24  1212 02/16/24  0810 02/16/24  0354 02/16/24  0101 02/15/24  0827 02/15/24  0441 02/14/24  0801 02/14/24  0551 02/13/24  1014 02/13/24  0433   SODIUM mmol/L 145 146* 146*  --  145 148* 147* 144   < > 141   < > 136   < > 132*   POTASSIUM mmol/L  --  4.2  --  4.5 3.6 3.8 4.3 3.9   < > 4.0   < > 4.5   < > 4.3   CHLORIDE mmol/L  --  112*  --   --  110* 112* 112* 110*   < > 107   < > 101   < > 95*   CO2 mmol/L  --  28.0  --   --  27.0 25.0 24.0 25.0   < > 25.0   < >  23.0   < > 20.0*   BUN mg/dL  --  36*  --   --  28* 31* 31* 31*   < > 29*   < > 26*   < > 29*   CREATININE mg/dL  --  0.73*  --   --  0.57* 0.63* 0.63* 0.61*   < > 0.67*   < > 0.64*   < > 0.72*   GLUCOSE mg/dL  --  152*  --   --  150* 161* 142* 184*   < > 151*   < > 127*   < > 121*   MAGNESIUM mg/dL  --  2.1  --   --   --   --  2.0  --   --  2.0  --  2.0  --  1.9   PHOSPHORUS mg/dL  --  2.1*  --   --   --  3.0 2.1*  --   --  2.6  --  2.9  --  4.2    < > = values in this interval not displayed.      Results from last 7 days   Lab Units 02/17/24  0601 02/16/24  0354 02/15/24  0441 02/14/24  0551 02/13/24  0433   ALK PHOS U/L 121* 143* 148* 168* 199*   AST (SGOT) U/L 50* 89* 136* 145* 161*   ALT (SGPT) U/L 78* 96* 98* 74* 62*     Results from last 7 days   Lab Units 02/12/24 2001   PH, ARTERIAL pH units 7.478*   PCO2, ARTERIAL mm Hg 23.1*   PO2 ART mm Hg 132.2*   O2 SATURATION ART % 99.3*   FIO2 % 28   HCO3 ART mmol/L 17.1*   BASE EXCESS ART mmol/L -4.9*       CT Chest With Contrast Diagnostic    Result Date: 2/16/2024  Impression: 1.No definitive metastatic disease in the chest, abdomen, or pelvis identified. 2.There is 7 mm nonspecific right hepatic lobe hypodensity, likely a cyst although too small to characterize. 3.Subacute to more chronic right-sided rib fractures. Correlate with patient history. Electronically Signed: Louis Richards MD  2/16/2024 11:27 AM EST  Workstation ID: [id number]    CT Abdomen Pelvis With Contrast    Result Date: 2/16/2024  Impression: 1.No definitive metastatic disease in the chest, abdomen, or pelvis identified. 2.There is 7 mm nonspecific right hepatic lobe hypodensity, likely a cyst although too small to characterize. 3.Subacute to more chronic right-sided rib fractures. Correlate with patient history. Electronically Signed: Louis Richards MD  2/16/2024 11:27 AM EST  Workstation ID: [id number]       Current medications     Scheduled Meds:   citalopram, 20 mg, Nasogastric,  Nightly  dexAMETHasone, 4 mg, Intravenous, Q6H  insulin lispro, 2-9 Units, Subcutaneous, Q6H  levETIRAcetam, 500 mg, Intravenous, Q12H  metoprolol tartrate, 12.5 mg, Nasogastric, Q12H  midazolam, 2 mg, Intravenous, Once  sodium chloride, 10 mL, Intravenous, Q12H  sodium phosphate, 15 mmol, Intravenous, Once        Continuous Infusions:            Plan discussed with RN. Reviewed all other data in the last 24 hours, including but not limited to vitals, labs, microbiology, imaging and pertinent notes from other providers.        ATIF Milton   Critical Care  02/17/24   08:16 EST

## 2024-02-17 NOTE — PLAN OF CARE
Goal Outcome Evaluation:  Pt mentation still fluctuating. On room air. No drips running. VSS.  Waiting to complete MRI due to agitation. NG in place with tube feeds at goal and tolerating.

## 2024-02-17 NOTE — PROGRESS NOTES
Consult received for ICU downgrade.  Chart reviewed.  Neurosurgery planning repeat MRI brain today.  Off hypertonic saline, goal sodium of 145-150 per neurosurgery.  Stable to downgrade from ICU per neurosurgery and intensivist.  Continue current care.  Palliative care consult placed.  Patient remains DNR/DNI.  Full hospitalist service note to follow tomorrow.      Electronically signed by Shweta Blanc DO, 02/17/24, 1:26 PM EST.

## 2024-02-17 NOTE — PROGRESS NOTES
[facility]     Progress Note    Patient Name: Jersey Celaya  : 1955  MRN: [id number]  Primary Care Physician:  Shailesh Thapa MD  Date of admission: 2024    Subjective   Subjective     Chief Complaint: Left parieto-occipital mass    History of Present Illness  Covering this weekend.  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  He is on steroids.  A repeat MRI is pending.  There is a small possibility that this is a lymphoma but I think it is probably a high-grade glioma.  He is going to be given some sedation since he has been very agitated.  It will probably come down to a biopsy if a diagnosis is to be determined.    Patient Reports see above    Review of Systems   Neurological:  Positive for weakness.   All other systems reviewed and are negative.      Objective   Objective     Vitals:   Temp:  [97.6 °F (36.4 °C)-100.3 °F (37.9 °C)] 99.9 °F (37.7 °C)  Heart Rate:  [] 126  Resp:  [17-24] 19  BP: ()/(44-76) 112/70    Physical Exam  Constitutional:       Appearance: He is well-developed.   HENT:      Head: Normocephalic and atraumatic.   Eyes:      Conjunctiva/sclera: Conjunctivae normal.      Pupils: Pupils are equal, round, and reactive to light.   Neck:      Vascular: No carotid bruit.   Neurological:      Motor: Weakness present.      Comments: Agitated, weak on right side          Result Review    Result Review:  I have personally reviewed the results from the time of this admission to 2024 12:35 EST and agree with these findings:  []  Laboratory list / accordion  []  Microbiology  [x]  Radiology  []  EKG/Telemetry   []  Cardiology/Vascular   []  Pathology  []  Old records  []  Other:  Most notable findings include: MRI BRAIN W WO CONTRAST     Date of Exam: 2024 1:01 AM EST     Indication: Head trauma, skull fracture or hematoma (Age 18-64y).     Comparison: CT head 2024.     Technique:  Routine multiplanar/multisequence sequence images  of the brain were obtained before and after the uneventful administration of Prohance.        Findings:  There is redemonstration of a large mass that appears centered within the left parietal white matter. The mass involves the splenium of the corpus callosum extensively and extends into the posterior body and crosses the midline into the right parietal   periventricular white matter. The solid discrete components of the mass measure up to 7.8 x 5.9 cm in the axial plane. There is also a large region of surrounding T2/FLAIR hyperintense signal that is nearly confluent in the periventricular white matter   and extends into the body and genu of the corpus callosum, as well as the left frontal periventricular white matter and into the posterior aspects of both basal ganglia. Abnormal signal infiltrates the bilateral parietal and occipital white matter. The   mass demonstrates an irregular discontinuous rim of enhancement and also exhibits some diffusion restriction suggesting necrosis and high cellularity. The mass also exhibits extensive susceptibility artifact suggesting tumoral hemorrhage. There is   significant mass effect in both parietal lobes with sulcal effacement on the left. There is near complete effacement of the posterior aspect of the left lateral ventricle and partial effacement of the right lateral ventricle. There is some enlargement of   the left temporal horn, which could suggest a trapped component. The third and fourth ventricles appear normal size. There is approximately 4 mm rightward shift of midline structures due to mass effect. There is no evidence of an acute infarct. No   herniation. There appears to be postoperative change at the right mastoid. The left mastoid is underpneumatized. There is mild paranasal sinus mucosal disease. There is scalp edema most pronounced at the right parieto-occipital region. The orbits appear   unremarkable. Cerebellum is within normal limits. There are  cervical degenerative and postoperative findings.     IMPRESSION:  Impression:  1.Large mass centered in the left parietal white matter and extensively involving the corpus callosum crossing to the right parietal white matter with extensive surrounding signal abnormality concerning for infiltrating tumor. This is most consistent   with a high-grade glioma.  2.There is extensive intratumoral susceptibility artifact suggesting hemorrhagic component.  3.There is significant mass effect with sulcal effacement on the left and partial effacement of the right lateral ventricle. There is some enlargement of the left temporal horn, which could suggest a trapped component.  4.There is 4 mm rightward shift of midline structures due to mass effect. No herniation.  5.No evidence of acute infarct or acute intracranial hemorrhage.        Lab Results   Component Value Date    GLUCOSE 152 (H) 02/17/2024    BUN 36 (H) 02/17/2024    CREATININE 0.73 (L) 02/17/2024    EGFR 99.7 02/17/2024    BCR 49.3 (H) 02/17/2024    K 4.2 02/17/2024    CO2 28.0 02/17/2024    CALCIUM 8.6 02/17/2024    ALBUMIN 3.7 02/17/2024    BILITOT 0.4 02/17/2024    AST 50 (H) 02/17/2024    ALT 78 (H) 02/17/2024     WBC   Date Value Ref Range Status   02/17/2024 12.20 (H) 3.40 - 10.80 10*3/mm3 Final     RBC   Date Value Ref Range Status   02/17/2024 3.43 (L) 4.14 - 5.80 10*6/mm3 Final     Hemoglobin   Date Value Ref Range Status   02/17/2024 11.0 (L) 13.0 - 17.7 g/dL Final     Hematocrit   Date Value Ref Range Status   02/17/2024 34.8 (L) 37.5 - 51.0 % Final     MCV   Date Value Ref Range Status   02/17/2024 101.4 (H) 79.0 - 97.0 fL Final     MCH   Date Value Ref Range Status   02/17/2024 32.1 26.6 - 33.0 pg Final     MCHC   Date Value Ref Range Status   02/17/2024 31.7 31.5 - 35.7 g/dL Final     RDW   Date Value Ref Range Status   02/17/2024 21.1 (H) 12.3 - 15.4 % Final     RDW-SD   Date Value Ref Range Status   02/17/2024 73.1 (H) 37.0 - 54.0 fl Final     MPV    Date Value Ref Range Status   02/17/2024 10.1 6.0 - 12.0 fL Final     Platelets   Date Value Ref Range Status   02/17/2024 189 140 - 450 10*3/mm3 Final     Neutrophil %   Date Value Ref Range Status   02/17/2024 89.6 (H) 42.7 - 76.0 % Final     Lymphocyte %   Date Value Ref Range Status   02/17/2024 1.8 (L) 19.6 - 45.3 % Final     Monocyte %   Date Value Ref Range Status   02/17/2024 8.1 5.0 - 12.0 % Final     Eosinophil %   Date Value Ref Range Status   02/17/2024 0.0 (L) 0.3 - 6.2 % Final     Basophil %   Date Value Ref Range Status   02/17/2024 0.5 0.0 - 1.5 % Final     Neutrophils, Absolute   Date Value Ref Range Status   02/17/2024 10.90 (H) 1.70 - 7.00 10*3/mm3 Final     Lymphocytes, Absolute   Date Value Ref Range Status   02/17/2024 0.20 (L) 0.70 - 3.10 10*3/mm3 Final     Monocytes, Absolute   Date Value Ref Range Status   02/17/2024 1.00 (H) 0.10 - 0.90 10*3/mm3 Final     Eosinophils, Absolute   Date Value Ref Range Status   02/17/2024 0.00 0.00 - 0.40 10*3/mm3 Final     Basophils, Absolute   Date Value Ref Range Status   02/17/2024 0.10 0.00 - 0.20 10*3/mm3 Final     nRBC   Date Value Ref Range Status   02/17/2024 0.0 0.0 - 0.2 /100 WBC Final           Assessment & Plan   Assessment / Plan     Brief Patient Summary:  Jersey Celaya is a 68 y.o. male who who presented with rhabdomyolysis but also has a left parieto-occipital mass probably a high-grade glioma but could be a lymphoma.  The final decision about whether or not a biopsy is going to be necessary is still pending.  The MRI will be attempted again today under sedation    Active Hospital Problems:  Active Hospital Problems    Diagnosis     **AMS (altered mental status)     Severe malnutrition      Plan: Neurologically stable, MRI hopefully to be accomplished today.      DVT prophylaxis:  Mechanical DVT prophylaxis orders are present.        CODE STATUS:    Medical Intervention Limits: NO intubation (DNI)  Code Status (Patient has no pulse and  is not breathing): No CPR (Do Not Attempt to Resuscitate)  Medical Interventions (Patient has pulse or is breathing): Limited Support    Disposition:  I expect patient to be discharged as per primary team.    Iglesia Fried MD

## 2024-02-18 NOTE — PROGRESS NOTES
Nutrition Services    Patient Name: Jersey Celaya  YOB: 1955  MRN: [id number]  Admission date: 2/12/2024    PROGRESS NOTE      Encounter Information: Progress note to check on TF. Pt remains quite confused - neurosurgery continues to follow. Palliative to discuss goals of care, based on poor prognosis. Pt has NG in place, and Nutren 2.0 continues infusing at 50mL/hour, as ordered. Minimal residual volumes.        PO Diet: NPO Diet NPO Type: Strict NPO   PO Supplements: None ordered   PO Intake:  NPO       Current nutrition support: Nutren 2.0 at 50mL/hr + 10mL q 8 hr water flush    Nutrition support review: Documented as above per EMR       Labs (reviewed below): Hypernatremia -- previously goal was to keep sodium between 140-145mL as of 2/16. Will increase water flush today to try to stay within that range. Calculated fluid deficit 1.2 L       GI Function:  Last documented BM 2/15 (x3 days ago)  Residuals WNL       Nutrition Intervention Updates: Continue Nutren 2.0 @ 50mL/hour, and increase water flush to 55mL/hour.       Results from last 7 days   Lab Units 02/18/24  0556 02/17/24  2020 02/17/24  0750 02/17/24  0601 02/17/24  0003 02/16/24  2048 02/16/24  1212 02/16/24  0810 02/16/24  0354   SODIUM mmol/L 149*  149* 147* 145 146*   < >  --  145   < > 147*   POTASSIUM mmol/L 4.3  --   --  4.2  --  4.5 3.6   < > 4.3   CHLORIDE mmol/L 114*  --   --  112*  --   --  110*   < > 112*   CO2 mmol/L 28.0  --   --  28.0  --   --  27.0   < > 24.0   BUN mg/dL 47*  --   --  36*  --   --  28*   < > 31*   CREATININE mg/dL 0.72*  --   --  0.73*  --   --  0.57*   < > 0.63*   CALCIUM mg/dL 8.4*  --   --  8.6  --   --  8.1*   < > 8.5*   BILIRUBIN mg/dL 0.4  --   --  0.4  --   --   --   --  0.5   ALK PHOS U/L 103  --   --  121*  --   --   --   --  143*   ALT (SGPT) U/L 70*  --   --  78*  --   --   --   --  96*   AST (SGOT) U/L 40  --   --  50*  --   --   --   --  89*   GLUCOSE mg/dL 150*  --   --  152*  --   --   150*   < > 142*    < > = values in this interval not displayed.     Results from last 7 days   Lab Units 02/18/24  0556 02/17/24  1441 02/17/24  0601 02/16/24  0810 02/16/24  0354   MAGNESIUM mg/dL 2.3  --  2.1  --  2.0   PHOSPHORUS mg/dL 3.4   < > 2.1*   < > 2.1*   HEMOGLOBIN g/dL 11.8*  --  11.0*  --  10.6*   HEMATOCRIT % 37.4*  --  34.8*  --  33.4*    < > = values in this interval not displayed.     COVID19   Date Value Ref Range Status   03/19/2021 Not Detected Not Detected - Ref. Range Final     Lab Results   Component Value Date    HGBA1C 5.50 01/17/2024       RD to follow up per protocol.    Electronically signed by:  Yoli Jonas, LATOYA  02/18/24 15:28 EST

## 2024-02-18 NOTE — PROGRESS NOTES
"  Critical Care Progress Note      Jersey Celaya : 1955 MRN:[id number] LOS:4  Rm: 2316/1      Principal Problem: AMS (altered mental status)      Reason for follow up: All the medical problems listed below     Summary      Jersey Celaya is a 68 y.o. male with PMH of hypertension, COPD, CAD, anxiety, depression, cervical disc degeneration was brought to the hospital via EMS and was admitted with a principal diagnosis of AMS (altered mental status).  Information for HPI taken from chart review and discussion with patient's son Macario as patient is minimally responsive at time of assessment.  It is unclear who called EMS as there was no family/friends that came with the patient.  In the emergency department patient was responding only to painful stimuli.  He had no purposeful movement to his extremities.  CT head was obtained which showed \"Findings suspicious for a mass in the posterior left periventricular white matter with surrounding edema, and mass effect. There is effacement of the posterior left lateral ventricle, effacement of the left posterior cerebral sulci, and 2-3 mm rightward midline shift. Finding is indeterminate but concerning for neoplasm or abscess. MRI with and without contrast is recommended for further evaluation. 2.There appears to be slight peripheral hyperdensity of the mass, and a component of hemorrhage cannot be excluded. This does not appear typical of an acute intraparenchymal hemorrhage, and no other hemorrhage is seen at this time.  3.Opacification of the right mastoid air cells and middle ear spaces with absence of the ossicles, as before. There is also hyperdense attenuation in the judah of the right external auditory canal and posteriorly along the right mastoid. Findings could be  related to chronic otomastoiditis with possible postoperative changes on the right.  4.Paranasal sinus mucosal thickening with trace air-fluid levels which could indicate acute sinusitis\".  " "Per ER documentation, neurosurgery was consulted and recommended MRI in the morning, and treatment with IV Keppra loading dose and IV Decadron 10 mg every 6 hours.  Patient was in c-collar on arrival, after chart review this was applied during emergency room visit in January of this year (see below).     Speaking with patient's son Macario, he states that his father \"has been declining for over a year\".  States that he has had to bathe his father, help him eat, and dress him.  States that he has been trying to get him placed in an assisted living or establish home health.  On assessment patient's pupils are unequal, and he only responds to vigorous stimulation.  He is oxygenating, and an ABG did not show hypoxia or hypercapnia.  MRI was obtained which showed \"large mass centered in the left parietal white matter and extensively involving the corpus callosum crossing to the right parietal white matter with extensive surrounding signal abnormality concerning for infiltrating tumor. This is most consistent with a high-grade glioma. There is extensive intratumoral susceptibility artifact suggesting hemorrhagic component. There is significant mass effect with sulcal effacement on the left and partial effacement of the right lateral ventricle. There is some enlargement of the left temporal horn, which could suggest a trapped component. There is 4 mm rightward shift of midline structures due to mass effect. No herniation. No evidence of acute infarct or acute intracranial hemorrhage\".  Neurosurgery was contacted concerning these findings, and instructed to continue with current regimen of decadron.  Neurosurgery evaluating patient, recommending IV steroids and hypertonic IVFs and monitoring.  Hospice consulted upon family request.  Will hold on hypertonic IVF until decision made whether patient will discharge home with hospice or continue treatment.     Significant Events      02/17/24 : Mentation waxing and waning, restless " and agitated at times. Tolerating room air.  3% saline discontinued per QI.  CT C/A/P did not reveal any obvious metastatic disease.  MRI brain has not been done due to pt agitation and movement.  Stable for transfer out of the ICU.     Of note, patient was deemed ineligible for GIP hospice, would have to be discharged home with hospice.  Hospice discussed with patient's family.  He remains DNR/DNI   2/18    Seen with RN  Continues to be confused  Agitated  Unable to do MRI  Downgraded from ICU  Will get palliative care for goals of care  Assessment / Plan      Acute encephalopathy  Large left parietal mass in white matter  S/p fall at home, recurrent falls per documentation  -Remains confused/agitated  -CT head 2/12 and MRI brain 2/13 with large mass in the left parietal  noted.   -Repeat MRI brain, has not been done due to patient agitation, movement would produce low quality diagnostic imaging  -CT C/A/P 2/16 with no obvious metastatic disease  -Neurosurgery following.  Await results of MRI brain for further treatment/surgical recommendations  -IV Decadron every 6 hours  -IV Keppra for seizure prophylaxis  -Hypertonic saline discontinued, goal Na of 145-150 has been stable.  -Heme/Onc following for treatment recommendations   -Palliative care consulted as requested, doesn't meet GIP criteria, but may discharge home with hospice.     Rhabdomyolysis  -Likely traumatic, patient with multiple falls per documentation  -CK 4195, repeat CK  2755  -Patient does not meet SIRS criteria, do not suspect infection at this time  -Continue IVFs.     Steroid induced hyperglycemia  Add SSI q6H     Chronic:  Hypertension: home antihypertensive of Toprol XL.  Due to marginal hypotension restarted BB at low-dose   COPD: Not in exacerbation, oxygen supplementation as needed. Tolerating room air  CAD: Continue home aspirin once able clinically appropriate  Anxiety/depression: Resume home citalopram, hold remeron for now  Vitamin D  deficiency: restart home cyanocobalamin once clinically appropriate  Peripheral neuropathy/RLS: Continue gabapentin, zanaflex when appropriate. Currently held for neuro exams and wakefulness  Enlarged prostate: Continue Flomax when able to take PO medications.     Disposition:  NABIL.  Possible discharge home with hospice if treatment showing no improvement.  Hospice following.     Code status:   Medical Intervention Limits: NO intubation (DNI)  Code Status (Patient has no pulse and is not breathing): No CPR (Do Not Attempt to Resuscitate)  Medical Interventions (Patient has pulse or is breathing): Limited Support        Nutrition:   NPO Diet NPO Type: Strict NPO   Diet, Tube Feeding Tube Feeding Formula: Nutren 2.0 (TwoCal); Tube Feeding Type: Continuous; Continuous Tube Feeding Start Rate (mL/hr): 30; Then Advance Rate By (mL/hr): 10; Every __ Hours: 6; To Goal Rate of (mL/hr): 50      DVT prophylaxis:  Mechanical DVT prophylaxis orders are present.           Subjective / Review of systems      Review of Systems   Unable to perform ROS: Mental status change   Nonverbal      Objective / Physical Exam      Vital signs:  Temp: 98.5 °F (36.9 °C)  BP: 126/70  Heart Rate: (!) 121  Resp: 24  SpO2: 95 %  Weight: 56.6 kg (124 lb 12.5 oz)     Admission Weight: Weight: 70.3 kg (155 lb)  Current Weight: Weight: 56.6 kg (124 lb 12.5 oz)     Input/Output in last 24 hours:     Intake/Output Summary (Last 24 hours) at 2/17/2024 0816  Last data filed at 2/17/2024 0345      Gross per 24 hour   Intake 1817 ml   Output --   Net 1817 ml      Net IO Since Admission: 3,835 mL [02/17/24 0816]      Physical Exam  Vitals and nursing note reviewed.   Constitutional:       Appearance: He is ill-appearing. He is not diaphoretic.      Comments: Lethargic at rest, agitated with stimulation.  Appears older than stated age. Cachectic and emaciated    HENT:      Head: Normocephalic and atraumatic.      Comments: Bitemporal wasting. Periorbital  tissue wasting     Nose: Nose normal.      Mouth/Throat:      Mouth: Mucous membranes are dry.   Eyes:      General: No scleral icterus.     Conjunctiva/sclera: Conjunctivae normal.      Pupils: Pupils are equal, round, and reactive to light.      Comments: Pupils dilated but equal and briskly responsive   Cardiovascular:      Heart sounds: Normal heart sounds. No murmur heard.     Comments: SR-ST  Pulmonary:      Effort: Pulmonary effort is normal. No respiratory distress.      Breath sounds: Normal breath sounds. No wheezing, rhonchi or rales.      Comments: Bases diminished  Abdominal:      General: There is no distension.      Tenderness: There is no guarding.      Comments: Scaphoid   Hypoactive bowel sounds   Musculoskeletal:         General: No deformity.      Cervical back: Neck supple. No rigidity.      Right lower leg: No edema.      Left lower leg: No edema.   Skin:     General: Skin is warm and dry.      Coloration: Skin is pale.   Neurological:      Comments: Nonverbal to conversation, unable to assess orientation. Lethargic, restless at times. Moves all extremities spontaneously. Not following commands.  W/d from noxious stimuli   Psychiatric:      Comments: Anxious/restless.            Radiology and Labs               Results from last 7 days   Lab Units 02/17/24  0601 02/16/24  0354 02/15/24  0441 02/14/24  0551 02/13/24  0433   WBC 10*3/mm3 12.20* 8.00 7.80 13.40* 9.90   HEMOGLOBIN g/dL 11.0* 10.6* 9.7* 9.8* 10.5*   HEMATOCRIT % 34.8* 33.4* 29.5* 30.0* 31.9*   PLATELETS 10*3/mm3 189 196 207 199 182                             Results from last 7 days   Lab Units 02/17/24  0750 02/17/24  0601 02/17/24  0003 02/16/24  2048 02/16/24  1212 02/16/24  0810 02/16/24  0354 02/16/24  0101 02/15/24  0827 02/15/24  0441 02/14/24  0801 02/14/24  0551 02/13/24  1014 02/13/24  0433   SODIUM mmol/L 145 146* 146*  --  145 148* 147* 144   < > 141   < > 136   < > 132*   POTASSIUM mmol/L  --  4.2  --  4.5 3.6 3.8 4.3  3.9   < > 4.0   < > 4.5   < > 4.3   CHLORIDE mmol/L  --  112*  --   --  110* 112* 112* 110*   < > 107   < > 101   < > 95*   CO2 mmol/L  --  28.0  --   --  27.0 25.0 24.0 25.0   < > 25.0   < > 23.0   < > 20.0*   BUN mg/dL  --  36*  --   --  28* 31* 31* 31*   < > 29*   < > 26*   < > 29*   CREATININE mg/dL  --  0.73*  --   --  0.57* 0.63* 0.63* 0.61*   < > 0.67*   < > 0.64*   < > 0.72*   GLUCOSE mg/dL  --  152*  --   --  150* 161* 142* 184*   < > 151*   < > 127*   < > 121*   MAGNESIUM mg/dL  --  2.1  --   --   --   --  2.0  --   --  2.0  --  2.0  --  1.9   PHOSPHORUS mg/dL  --  2.1*  --   --   --  3.0 2.1*  --   --  2.6  --  2.9  --  4.2    < > = values in this interval not displayed.               Results from last 7 days   Lab Units 02/17/24  0601 02/16/24  0354 02/15/24  0441 02/14/24  0551 02/13/24  0433   ALK PHOS U/L 121* 143* 148* 168* 199*   AST (SGOT) U/L 50* 89* 136* 145* 161*   ALT (SGPT) U/L 78* 96* 98* 74* 62*           Results from last 7 days   Lab Units 02/12/24 2001   PH, ARTERIAL pH units 7.478*   PCO2, ARTERIAL mm Hg 23.1*   PO2 ART mm Hg 132.2*   O2 SATURATION ART % 99.3*   FIO2 % 28   HCO3 ART mmol/L 17.1*   BASE EXCESS ART mmol/L -4.9*         CT Chest With Contrast Diagnostic     Result Date: 2/16/2024  Impression: 1.No definitive metastatic disease in the chest, abdomen, or pelvis identified. 2.There is 7 mm nonspecific right hepatic lobe hypodensity, likely a cyst although too small to characterize. 3.Subacute to more chronic right-sided rib fractures. Correlate with patient history. Electronically Signed: Louis Richards MD  2/16/2024 11:27 AM EST  Workstation ID: [id number]     CT Abdomen Pelvis With Contrast     Result Date: 2/16/2024  Impression: 1.No definitive metastatic disease in the chest, abdomen, or pelvis identified. 2.There is 7 mm nonspecific right hepatic lobe hypodensity, likely a cyst although too small to characterize. 3.Subacute to more chronic right-sided rib fractures.  Correlate with patient history. Electronically Signed: Louis Richards MD  2/16/2024 11:27 AM EST  Workstation ID: [id number]         Current medications      Scheduled Meds:   citalopram, 20 mg, Nasogastric, Nightly  dexAMETHasone, 4 mg, Intravenous, Q6H  insulin lispro, 2-9 Units, Subcutaneous, Q6H  levETIRAcetam, 500 mg, Intravenous, Q12H  metoprolol tartrate, 12.5 mg, Nasogastric, Q12H  midazolam, 2 mg, Intravenous, Once  sodium chloride, 10 mL, Intravenous, Q12H  sodium phosphate, 15 mmol, Intravenous, Once           Continuous Infusions:            Plan discussed with RN. Reviewed all other data in the last 24 hours, including but not limited to vitals, labs, microbiology, imaging and pertinent notes from other providers.

## 2024-02-18 NOTE — PROGRESS NOTES
[facility]     Progress Note    Patient Name: Jersey Celaya  : 1955  MRN: [id number]  Primary Care Physician:  Shailesh Thapa MD  Date of admission: 2024    Subjective   Subjective     Chief Complaint: Probable high-grade malignant neoplasm    History of Present Illness  The repeat MRI was done although it was limited by movement artifact and no contrast was given.  I think this probably represents a high-grade glioma although we do not know for sure.  Regardless the patient's prognosis is quite poor and we are likely dealing with a malignant process either high-grade glioma or lymphoma.  I will leave it to the primary team which will be back in the hospital tomorrow regarding whether or not a biopsy is recommended.  And that would be mainly to confirm the diagnosis 1 way or the other.    Patient Reports see above    Review of Systems   Constitutional:  Positive for fatigue and unexpected weight change.   Neurological:  Positive for weakness.   All other systems reviewed and are negative.      Objective   Objective     Vitals:   Temp:  [98.2 °F (36.8 °C)-99.9 °F (37.7 °C)] 99 °F (37.2 °C)  Heart Rate:  [] 122  Resp:  [15-19] 18  BP: ()/(55-75) 101/70    Physical Exam  Constitutional:       Appearance: He is well-developed.   HENT:      Head: Normocephalic and atraumatic.   Eyes:      Conjunctiva/sclera: Conjunctivae normal.      Pupils: Pupils are equal, round, and reactive to light.   Neck:      Vascular: No carotid bruit.   Neurological:      Mental Status: He is disoriented and confused.      Cranial Nerves: Cranial nerves 2-12 are intact.      Motor: Weakness present.      Deep Tendon Reflexes:      Reflex Scores:       Tricep reflexes are 2+ on the right side and 2+ on the left side.       Bicep reflexes are 2+ on the right side and 2+ on the left side.       Brachioradialis reflexes are 2+ on the right side and 2+ on the left side.       Patellar reflexes are 2+ on the  right side and 2+ on the left side.       Achilles reflexes are 2+ on the right side and 2+ on the left side.         Result Review    Result Review:  I have personally reviewed the results from the time of this admission to 2/18/2024 10:45 EST and agree with these findings:  []  Laboratory list / accordion  []  Microbiology  [x]  Radiology  []  EKG/Telemetry   []  Cardiology/Vascular   []  Pathology  []  Old records  []  Other:  Most notable findings include: MRI BRAIN WO CONTRAST     Date of Exam: 2/17/2024 4:20 PM EST     Indication: Brain/CNS neoplasm, assess treatment response.     Comparison: Brain MRI dated 2/13/2024     Technique:  Routine multiplanar/multisequence sequence images of the brain were obtained without contrast administration.     FINDINGS:  Extremely limited examination for assessment of treatment response. There is significant patient motion in most of the imaging sequences, and IV contrast was not administered as the patient was unable to sign contrast consent.     Complex mass lesion is seen centered about the left parieto-occipital region crossing the midline along the splenium of the corpus callosum. Extensive surrounding vasogenic edema, most pronounced in the left parieto-occipital region.     The dominant left-sided component measuring 5.9 x 2.7 cm and the smaller right-sided component measuring approximately 2.8 x 1.5 cm. This appears similar or slightly smaller in comparison with 2/13/2024. Of note, the SWI signal hypointensity within the   lesion has decreased and is now relatively confined to the periphery of the lesion.     3 to 4 mm left right midline shift is again suggested. Mass effect with sulcal effacement again noted within both parietal lobes.     No hydrocephalus is seen. No diffusion restriction is identified to suggest acute infarct. The visualized intracranial flow-voids appear unremarkable. The paranasal sinuses and mastoid air cells show no fluid signal. Orbital  structures appear grossly   unremarkable. The visualized superficial soft tissues and cervical spine demonstrate no significant abnormality.     IMPRESSION:     Extremely limited examination for assessment of treatment response. There is significant patient motion in most of the imaging sequences, and IV contrast was not administered as the patient was unable to sign contrast consent.     Complex mass lesion is seen centered about the left parieto-occipital region crossing the midline along the splenium of the corpus callosum. Extensive surrounding vasogenic edema, most pronounced in the left parieto-occipital region.     The dominant left-sided component measuring 5.9 x 2.7 cm and the smaller right-sided component measuring approximately 2.8 x 1.5 cm. This appears similar or slightly smaller in comparison with 2/13/2024. Of note, the SWI signal hypointensity within the   lesion has decreased and is now relatively confined to the periphery of the lesion.     3 to 4 mm left right midline shift is again suggested.      No findings to suggest acute infarct.     Consider repeat examination, possibly utilizing patient sedation.     Please see above for additional details.        Lab Results   Component Value Date    GLUCOSE 150 (H) 02/18/2024    BUN 47 (H) 02/18/2024    CREATININE 0.72 (L) 02/18/2024    EGFR 100.1 02/18/2024    BCR 65.3 (H) 02/18/2024    K 4.3 02/18/2024    CO2 28.0 02/18/2024    CALCIUM 8.4 (L) 02/18/2024    ALBUMIN 3.5 02/18/2024    BILITOT 0.4 02/18/2024    AST 40 02/18/2024    ALT 70 (H) 02/18/2024     WBC   Date Value Ref Range Status   02/18/2024 16.60 (H) 3.40 - 10.80 10*3/mm3 Final     RBC   Date Value Ref Range Status   02/18/2024 3.64 (L) 4.14 - 5.80 10*6/mm3 Final     Hemoglobin   Date Value Ref Range Status   02/18/2024 11.8 (L) 13.0 - 17.7 g/dL Final     Hematocrit   Date Value Ref Range Status   02/18/2024 37.4 (L) 37.5 - 51.0 % Final     MCV   Date Value Ref Range Status   02/18/2024 102.7  (H) 79.0 - 97.0 fL Final     MCH   Date Value Ref Range Status   02/18/2024 32.5 26.6 - 33.0 pg Final     MCHC   Date Value Ref Range Status   02/18/2024 31.6 31.5 - 35.7 g/dL Final     RDW   Date Value Ref Range Status   02/18/2024 21.7 (H) 12.3 - 15.4 % Final     RDW-SD   Date Value Ref Range Status   02/18/2024 77.4 (H) 37.0 - 54.0 fl Final     MPV   Date Value Ref Range Status   02/18/2024 9.0 6.0 - 12.0 fL Final     Platelets   Date Value Ref Range Status   02/18/2024 127 (L) 140 - 450 10*3/mm3 Final     Neutrophil %   Date Value Ref Range Status   02/18/2024 91.5 (H) 42.7 - 76.0 % Final     Lymphocyte %   Date Value Ref Range Status   02/18/2024 2.0 (L) 19.6 - 45.3 % Final     Monocyte %   Date Value Ref Range Status   02/18/2024 6.4 5.0 - 12.0 % Final     Eosinophil %   Date Value Ref Range Status   02/18/2024 0.0 (L) 0.3 - 6.2 % Final     Basophil %   Date Value Ref Range Status   02/18/2024 0.1 0.0 - 1.5 % Final     Neutrophils, Absolute   Date Value Ref Range Status   02/18/2024 15.20 (H) 1.70 - 7.00 10*3/mm3 Final     Lymphocytes, Absolute   Date Value Ref Range Status   02/18/2024 0.30 (L) 0.70 - 3.10 10*3/mm3 Final     Monocytes, Absolute   Date Value Ref Range Status   02/18/2024 1.10 (H) 0.10 - 0.90 10*3/mm3 Final     Eosinophils, Absolute   Date Value Ref Range Status   02/18/2024 0.00 0.00 - 0.40 10*3/mm3 Final     Basophils, Absolute   Date Value Ref Range Status   02/18/2024 0.00 0.00 - 0.20 10*3/mm3 Final     nRBC   Date Value Ref Range Status   02/18/2024 0.0 0.0 - 0.2 /100 WBC Final           Assessment & Plan   Assessment / Plan     Brief Patient Summary:  Jersey Celaya is a 68 y.o. male who likely has a high-grade glioma in the left parieto-occipital region with mass effect.  Could be a lymphoma but what ever it is it is quite aggressive.  The primary team will be back tomorrow to discuss with the family whether or not a biopsy is even indicated given the overall poor prognosis and his  decline over the last 6 months to a year.    Active Hospital Problems:  Active Hospital Problems    Diagnosis     **AMS (altered mental status)     Severe malnutrition      Plan: See above      DVT prophylaxis:  Mechanical DVT prophylaxis orders are present.        CODE STATUS:    Medical Intervention Limits: NO intubation (DNI)  Code Status (Patient has no pulse and is not breathing): No CPR (Do Not Attempt to Resuscitate)  Medical Interventions (Patient has pulse or is breathing): Limited Support    Disposition:  I expect patient to be discharged as per the primary team.    Iglesia Fried MD

## 2024-02-18 NOTE — PLAN OF CARE
Goal Outcome Evaluation:  Plan of Care Reviewed With: patient, son        Progress: declining  Pt was agitated all shift. Pt is in restraints. Pt is on room air. Pt is on no drips.  Pt is disoriented x4 with heavy confusing. Pt is not able to be redirected verbally or physically.   Pt was able to go down for the needed MRI with contrast. However, due to movement despite 2 mg of versed pt started moving head a lot and MRI may not have been completed. Contrast was not able to be given due to O2 sats dropping to mid 80's.

## 2024-02-19 NOTE — CONSULTS
CARDIOLOGY CONSULT:    Jersey Celaya  1955  male  [id number]      Referring Provider: Hospitalist  Reason for Consultation: Sinus tachycardia    Patient Care Team:  Shailesh Thapa MD as PCP - General  Mack French MD as Consulting Physician (Cardiology)    Chief complaint mental status changes    Subjective .     History of present illness:  Jersey Celaya is a 68 y.o. male with a history of CAD COPD hypertension hyperlipidemia and other medical problems presented to the hospital with mental status changes and most of the history was obtained by the hospitalist from the patient's son.  Patient had a CT of the head which showed findings suspicious for a mass and hence neurosurgery neurologist have been seeing the patient had an MRI was done.  Patient also was noted to have significant sinus tachycardia and hence cardiology consult is called.  Patient was seen by neurosurgery and recommended IV steroids and hypotonic IV fluids and monitoring.  Patient's family is now requesting hospice consult also..     Review of Systems   Unable to perform ROS: Mental status change       History  Past Medical History:   Diagnosis Date    CAD (coronary artery disease)     COPD (chronic obstructive pulmonary disease)     Depression     Depression     Dizziness     Falls     Hyperlipidemia     Hypertension     Low back pain        Past Surgical History:   Procedure Laterality Date    CARDIAC CATHETERIZATION N/A 3/22/2021    Procedure: Left Heart Cath with angiogram;  Surgeon: Mack French MD;  Location:  [facility];  Service: Cardiovascular;  Laterality: N/A;    CARDIAC CATHETERIZATION N/A 3/22/2021    Procedure: Left ventriculography;  Surgeon: Mack French MD;  Location:  [facility];  Service: Cardiovascular;  Laterality: N/A;    CERVICAL SPINE SURGERY      INNER EAR SURGERY      WISDOM TOOTH EXTRACTION         Family History   Problem Relation Age of Onset    Cancer Mother      Hypertension Mother     Cancer Father        Social History     Tobacco Use    Smoking status: Every Day     Packs/day: .5     Types: Cigarettes    Smokeless tobacco: Never   Vaping Use    Vaping Use: Never used   Substance Use Topics    Alcohol use: Never    Drug use: Yes     Types: Amphetamines, Marijuana     Comment: last used meth smoked 1/16/24        Medications Prior to Admission   Medication Sig Dispense Refill Last Dose    albuterol sulfate  (90 Base) MCG/ACT inhaler Inhale 2 puffs Every 4 (Four) Hours As Needed for Wheezing.       aspirin 81 MG EC tablet Take 1 tablet by mouth Daily.       atorvastatin (LIPITOR) 10 MG tablet Take 1 tablet by mouth Every Night.       citalopram (CeleXA) 40 MG tablet Take 1 tablet by mouth Every Night.       diphenoxylate-atropine (LOMOTIL) 2.5-0.025 MG per tablet Take 1 tablet by mouth 2 (Two) Times a Day As Needed for Diarrhea.       ferrous sulfate 324 MG tablet delayed-release Take 1 tablet by mouth Daily With Breakfast. 30 tablet 0     fluticasone (FLONASE) 50 MCG/ACT nasal spray 1 spray into the nostril(s) as directed by provider Daily.       gabapentin (NEURONTIN) 300 MG capsule Take 1 capsule by mouth 3 (Three) Times a Day.       meloxicam (MOBIC) 15 MG tablet Take 1 tablet by mouth Daily As Needed.       metoprolol succinate XL (TOPROL-XL) 25 MG 24 hr tablet Take 1 tablet by mouth Daily. 90 tablet 3     mirtazapine (REMERON) 15 MG tablet Take 1 tablet by mouth Every Night.       tamsulosin (FLOMAX) 0.4 MG capsule 24 hr capsule Take 1 capsule by mouth Every Night.       temazepam (RESTORIL) 15 MG capsule Take 1 capsule by mouth At Night As Needed for Sleep.       tiZANidine (ZANAFLEX) 4 MG tablet Take 1 tablet by mouth Every Night.       vitamin B-12 (CYANOCOBALAMIN) 1000 MCG tablet Take 1 tablet by mouth Daily. 30 tablet 0        Allergies: Pregabalin and Gluten meal    Scheduled Meds:citalopram, 20 mg, Nasogastric, Nightly  dexAMETHasone, 4 mg,  "Intravenous, Q6H  insulin lispro, 2-9 Units, Subcutaneous, Q6H  levETIRAcetam, 500 mg, Oral, Q12H  metoprolol tartrate, 25 mg, Nasogastric, Q12H  midazolam, 2 mg, Intravenous, Once  potassium chloride, 10 mEq, Intravenous, Q1H  sodium chloride, 10 mL, Intravenous, Q12H      Continuous Infusions:sodium chloride, 100 mL/hr, Last Rate: 100 mL/hr (02/19/24 1819)      PRN Meds:.  senna-docusate sodium **AND** polyethylene glycol **AND** bisacodyl **AND** bisacodyl    Calcium Replacement - Follow Nurse / BPA Driven Protocol    dextrose    dextrose    glucagon (human recombinant)    hydrALAZINE    Magnesium Standard Dose Replacement - Follow Nurse / BPA Driven Protocol    metoprolol tartrate    nitroglycerin    ondansetron ODT **OR** ondansetron    phenol    Phosphorus Replacement - Follow Nurse / BPA Driven Protocol    Potassium Replacement - Follow Nurse / BPA Driven Protocol    [COMPLETED] Insert Peripheral IV **AND** sodium chloride    sodium chloride    sodium chloride    sodium chloride    temazepam    Objective     VITAL SIGNS  Vitals:    02/19/24 1600 02/19/24 1630 02/19/24 1700 02/19/24 1800   BP: 101/77  106/77    Pulse: (!) 126  119 (!) 121   Resp:       Temp:  98.5 °F (36.9 °C)     TempSrc:  Axillary     SpO2:       Weight:       Height:           Flowsheet Rows      Flowsheet Row First Filed Value   Admission Height 180.3 cm (71\") Documented at 02/12/2024 1841   Admission Weight 70.3 kg (155 lb) Documented at 02/12/2024 1841             TELEMETRY: Sinus tachycardia    Physical Exam:  Eyes:      General: No scleral icterus.  HENT:      Head: Normocephalic and atraumatic.   Neck:      Vascular: No carotid bruit or JVD.   Pulmonary:      Effort: Pulmonary effort is normal.      Breath sounds: Normal breath sounds. No wheezing. No rales.   Cardiovascular:      Normal rate. Regular rhythm.      Murmurs: There is a systolic murmur.   Pulses:     Intact distal pulses.   Abdominal:      General: Bowel sounds are " normal.      Palpations: Abdomen is soft.   Musculoskeletal: Normal range of motion.      Cervical back: Neck supple. Skin:     General: Skin is warm and dry.      Findings: No rash.   Neurological:      Comments: No focal deficits          Results Review:   I reviewed the patient's new clinical results.  Lab Results (last 24 hours)       Procedure Component Value Units Date/Time    POC Glucose Q6H [[id number]]  (Abnormal) Collected: 02/19/24 1815    Specimen: Blood Updated: 02/19/24 1816     Glucose 161 mg/dL      Comment: Serial Number: [id number]:  [id number]       Basic Metabolic Panel [[id number]]  (Abnormal) Collected: 02/19/24 1637    Specimen: Blood Updated: 02/19/24 1715     Glucose 129 mg/dL      BUN 61 mg/dL      Creatinine 0.87 mg/dL      Sodium 156 mmol/L      Potassium 3.6 mmol/L      Chloride 125 mmol/L      CO2 23.0 mmol/L      Calcium 6.5 mg/dL      BUN/Creatinine Ratio 70.1     Anion Gap 8.0 mmol/L      eGFR 94.6 mL/min/1.73     Narrative:      GFR Normal >60  Chronic Kidney Disease <60  Kidney Failure <15      POC Glucose Q6H [[id number]]  (Abnormal) Collected: 02/19/24 1255    Specimen: Blood Updated: 02/19/24 1257     Glucose 176 mg/dL      Comment: Serial Number: [id number]:  [id number]       CBC & Differential [[id number]]  (Abnormal) Collected: 02/19/24 0640    Specimen: Blood from Arm, Right Updated: 02/19/24 1056    Narrative:      The following orders were created for panel order CBC & Differential.  Procedure                               Abnormality         Status                     ---------                               -----------         ------                     CBC Auto Differential[[id number]]        Abnormal            Final result               Scan Slide[[id number]]                                                                    Please view results for these tests on the individual orders.    CBC Auto Differential [[id number]]  (Abnormal) Collected: 02/19/24 1031     Specimen: Blood from Arm, Right Updated: 02/19/24 1056     WBC 14.00 10*3/mm3      RBC 3.77 10*6/mm3      Hemoglobin 12.2 g/dL      Hematocrit 38.9 %      .0 fL      MCH 32.3 pg      MCHC 31.4 g/dL      RDW 21.9 %      RDW-SD 77.4 fl      MPV 11.0 fL      Platelets 95 10*3/mm3      Neutrophil % 82.2 %      Lymphocyte % 4.1 %      Monocyte % 13.4 %      Eosinophil % 0.0 %      Basophil % 0.3 %      Neutrophils, Absolute 11.50 10*3/mm3      Lymphocytes, Absolute 0.60 10*3/mm3      Monocytes, Absolute 1.90 10*3/mm3      Eosinophils, Absolute 0.00 10*3/mm3      Basophils, Absolute 0.00 10*3/mm3      nRBC 0.1 /100 WBC     Phosphorus [[id number]]  (Normal) Collected: 02/19/24 0640    Specimen: Blood from Arm, Right Updated: 02/19/24 0712     Phosphorus 4.3 mg/dL     Magnesium [[id number]]  (Abnormal) Collected: 02/19/24 0640    Specimen: Blood from Arm, Right Updated: 02/19/24 0712     Magnesium 2.5 mg/dL     Comprehensive Metabolic Panel [[id number]]  (Abnormal) Collected: 02/19/24 0640    Specimen: Blood from Arm, Right Updated: 02/19/24 0712     Glucose 170 mg/dL      BUN 70 mg/dL      Creatinine 1.09 mg/dL      Sodium 154 mmol/L      Potassium 4.4 mmol/L      Comment: Slight hemolysis detected by analyzer. Result may be falsely elevated.        Chloride 117 mmol/L      CO2 27.0 mmol/L      Calcium 8.4 mg/dL      Total Protein 5.0 g/dL      Albumin 3.3 g/dL      ALT (SGPT) 55 U/L      AST (SGOT) 27 U/L      Alkaline Phosphatase 96 U/L      Total Bilirubin 0.4 mg/dL      Globulin 1.7 gm/dL      A/G Ratio 1.9 g/dL      BUN/Creatinine Ratio 64.2     Anion Gap 10.0 mmol/L      eGFR 74.4 mL/min/1.73     Narrative:      GFR Normal >60  Chronic Kidney Disease <60  Kidney Failure <15      POC Glucose Once [[id number]]  (Abnormal) Collected: 02/19/24 0541    Specimen: Blood Updated: 02/19/24 0542     Glucose 174 mg/dL      Comment: Serial Number: [id number]:  [id number]       POC Glucose Once [[id number]]  (Abnormal)  Collected: 24    Specimen: Blood Updated: 24     Glucose 186 mg/dL      Comment: Serial Number: [id number]:  [id number]       Sodium [[id number]]  (Abnormal) Collected: 24    Specimen: Blood from Arm, Right Updated: 24     Sodium 153 mmol/L             Imaging Results (Last 24 Hours)       Procedure Component Value Units Date/Time    XR Abdomen KUB [[id number]] Collected: 24 024     Updated: 24 025    Narrative:      XR ABDOMEN KUB    Date of Exam: 2024 2:07 AM EST    Indication: NG tube placement    Comparison: 2024.    Findings:  Enteric tube within the stomach.      Impression:      Impression:  Enteric tube within the stomach.        Electronically Signed: Venita Mcwilliams MD    2024 2:48 AM EST    Workstation ID: [id number]            EKG      I personally viewed and interpreted the patient's EKG/Telemetry data:    ECHOCARDIOGRAM:  Results for orders placed during the hospital encounter of 24    Adult Transthoracic Echo Complete W/ Cont if Necessary Per Protocol    Interpretation Summary    Left ventricular ejection fraction appears to be 56 - 60%.         STRESS MYOVIEW:  Results for orders placed during the hospital encounter of 03/15/21    Stress test with myocardial perfusion one day    Interpretation Summary  · Left ventricular ejection fraction is normal. (Calculated EF = 54%).  · Myocardial perfusion imaging indicates a large-sized, severe area of ischemia located in the inferior wall.  · Impressions are consistent with a high risk study.       CARDIAC CATHETERIZATION:    Results for orders placed during the hospital encounter of 21    Cardiac Catheterization/Vascular Study    Narrative  Heart Cath Report    NAME:              Jersey Celaya  :                1955  AGE/SEX:        65 y.o. male  MRN:                [id number]  ADM DATE:      [unfilled]  DOS:      Pre-Procedure Notes  H&P Performed  [x]  Yes []   No       []  N/A    Indications:  []  ACS <= 24 HRS  []  ACS >24 HRS  [x]  New Onset Angina <= 2 mos  []  Worsening Angina  []  Resuscitated Cardiac Arrest  []  Angina on Exertion:  []  Suspected CAD  []  Valvular Disease  []  Pericardial Disease  []  Cardiac Arrythmia  []  Cardiomyopathy  []  LV Dysfunction  []  Syncope  []  Post Cardiac Transplant  []  Eval. For Exercise Clearance  [x]  Abnormal Stress Test  []  Other  []  Pre-Operative Evaluation  If Pre-Op Eval:  Evaluation for Surgery Type:  []  Cardiac Surgery   []  Non-Cardiac Surgery  Functional Capacity:  []  <4 METS  []  >=4 METS w/o symptoms  []  >= 4 METS with symptoms  []  Unknown  Surgical Risk:  []  Low  []  Intermediate  []  High Risk: Vascular  []  High Risk Non-Vascular    Risks, Benefits, & Complications Discussed:  [x]  Yes  []  No  []  N/A    Questions Answered:  [x]  Yes  []  No  []  N/A    Consent Obtained:  [x]  Yes  []  No  []  N/A    CHF: []  Yes  [x]  No  If Yes:  Newly Diagnosed?  []  Yes  []  No  If Yes:  HF Type:  []  Diastolic  []  Systolic  []  Unknown      Procedure Description  The patient underwent successful [x]  Left  []  Right  []  Left & Right  Heart catheterization and coronary angiography via the  [x]  Femoral approach  []  Radial approach  []  Brachial approach    Procedure Narrative:  Informed consent was obtained from the patient after explaining risks and benefits.  Patient was brought to the cardiac catheterization laboratory and placed on the table in the usual fashion.  Right groin was shaved and prepped in sterile fashion. Moderate conscious sedation was given utilizing IV Versed and fentanyl administered by RN with continuous EKG oximetry and hemodynamic monitoring supervised by me throughout the entire case, conscious sedation time was 30 minutes.  I was present with the patient for the duration of moderate sedation and supervised staff who had no other duties and monitored the patient for the entire procedure  patient had Ness 2-3 sedation scale. 2% lidocaine was used for local anesthesia to the right groin.  A total of 20 cc was used.  A 6 [place] sheath was placed in the right femoral artery.  A 6 [place] pigtail catheter, 6 [place] JR4 catheter and a 6 [place] JL4 catheter was used for the procedure.  After the cardiac catheterization is complete, all the catheters and sheath were removed.  Patient tolerated the procedure very well.  No complications noted.      Hemodynamic    LVEDP:8   Estimated EF %:60    Initial Aortic Pressure: 120/70    AV Gradient: No gradient    Rt. Heart Pressure:    Wall Motion:  Dominance:  []  Left  [x]  Right  []  Co-Dominant    Coronary Arteriography: (Please Code highest degree of stenosis)    Left Main %:   0  Proximal LAD %:  0  Mid/Distal LAD %: 50%  LCX %: 0  Ramus:  RCA %: 20 to 30%  Lima %:  SVG(s) %:      Complications: No complications    Estimated Blood Loss:  None      Impression and Recommendation: Nonobstructive coronary disease  Normal LV systolic function  Continue medical therapy  Electronically signed by Mack French MD, 03/22/21, 12:08 PM EDT       OTHER:         MDM      Sinus tachycardia  Patient presented with mental dizziness and had sinus tachycardia and is currently on beta-blockers  No need for further evaluation because hospice consult has been obtained.    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  Neurosurgery has seen and recommended medical treatment with Decadron and Keppra and hypertonic saline  However family requested palliative care.    Hypertension  Patient's blood pressure is stable on Toprol    Rhabdomyolysis  Patient had several falls and his CK is elevated and we are monitoring the renal function at this time.    History of coronary disease  Patient is a 50% disease in the LAD and a 20 to 30% RCA and has normal function but  will continue medical therapy only.        Mack French MD  02/19/24  18:35 EST

## 2024-02-19 NOTE — NURSING NOTE
Patient pulled out his NG tube. New NG was placed by charge nurse and placement confirmed with KUB. Tube feeding resumed without issue.

## 2024-02-19 NOTE — PROGRESS NOTES
Nutrition Services    Patient Name: Jersey Celaya  YOB: 1955  MRN: [id number]  Admission date: 2/12/2024    PROGRESS NOTE      Encounter Information: Progress note to check on TF.  RD messaged attending MD re: plan for Na+ goal range.  Attending then consulted Nephrology for hypernatremia.  Noted RD changed water flush order to 55 mL/hour on 2/18 but pump at bedside continued at 10 mL q 8 hours 2/19.  RD messaged RN who corrected the pump.         PO Diet: NPO Diet NPO Type: Strict NPO   PO Supplements: None ordered   PO Intake:  NPO       Current nutrition support: Nutren 2.0 at 50mL/hr + 55mL/hr water flush    Nutrition support review: Documented as above per EMR       Labs (reviewed below): Hypernatremia -- per Nephrology note, goal continues to be sodium between 140-145mL, estimated 1.1-1.5L fluid deficit        GI Function:  Last documented BM 2/18 (yesterday)  Residuals WNL       Nutrition Intervention Updates: Continue Nutren 2.0 @ 50mL/hour + water flush to 55mL/hour.       Results from last 7 days   Lab Units 02/19/24  0640 02/18/24  2121 02/18/24  0556 02/17/24  0750 02/17/24  0601   SODIUM mmol/L 154* 153* 149*  149*   < > 146*   POTASSIUM mmol/L 4.4  --  4.3  --  4.2   CHLORIDE mmol/L 117*  --  114*  --  112*   CO2 mmol/L 27.0  --  28.0  --  28.0   BUN mg/dL 70*  --  47*  --  36*   CREATININE mg/dL 1.09  --  0.72*  --  0.73*   CALCIUM mg/dL 8.4*  --  8.4*  --  8.6   BILIRUBIN mg/dL 0.4  --  0.4  --  0.4   ALK PHOS U/L 96  --  103  --  121*   ALT (SGPT) U/L 55*  --  70*  --  78*   AST (SGOT) U/L 27  --  40  --  50*   GLUCOSE mg/dL 170*  --  150*  --  152*    < > = values in this interval not displayed.     Results from last 7 days   Lab Units 02/19/24  1031 02/19/24  0640 02/18/24  0556 02/17/24  1441 02/17/24  0601   MAGNESIUM mg/dL  --  2.5* 2.3  --  2.1   PHOSPHORUS mg/dL  --  4.3 3.4   < > 2.1*   HEMOGLOBIN g/dL 12.2*  --  11.8*  --  11.0*   HEMATOCRIT % 38.9  --  37.4*  --  34.8*     < > = values in this interval not displayed.     COVID19   Date Value Ref Range Status   03/19/2021 Not Detected Not Detected - Ref. Range Final     Lab Results   Component Value Date    HGBA1C 5.50 01/17/2024       RD to follow up per protocol.    Electronically signed by:  Janie Vo RD  02/19/24 11:54 EST

## 2024-02-19 NOTE — PROGRESS NOTES
"  Critical Care Progress Note      Jersey Celaya : 1955 MRN:[id number] LOS:4  Rm: 2316/1      Principal Problem: AMS (altered mental status)      Reason for follow up: All the medical problems listed below     Summary      Jersey Celaya is a 68 y.o. male with PMH of hypertension, COPD, CAD, anxiety, depression, cervical disc degeneration was brought to the hospital via EMS and was admitted with a principal diagnosis of AMS (altered mental status).  Information for HPI taken from chart review and discussion with patient's son Macario as patient is minimally responsive at time of assessment.  It is unclear who called EMS as there was no family/friends that came with the patient.  In the emergency department patient was responding only to painful stimuli.  He had no purposeful movement to his extremities.  CT head was obtained which showed \"Findings suspicious for a mass in the posterior left periventricular white matter with surrounding edema, and mass effect. There is effacement of the posterior left lateral ventricle, effacement of the left posterior cerebral sulci, and 2-3 mm rightward midline shift. Finding is indeterminate but concerning for neoplasm or abscess. MRI with and without contrast is recommended for further evaluation. 2.There appears to be slight peripheral hyperdensity of the mass, and a component of hemorrhage cannot be excluded. This does not appear typical of an acute intraparenchymal hemorrhage, and no other hemorrhage is seen at this time.  3.Opacification of the right mastoid air cells and middle ear spaces with absence of the ossicles, as before. There is also hyperdense attenuation in the judah of the right external auditory canal and posteriorly along the right mastoid. Findings could be  related to chronic otomastoiditis with possible postoperative changes on the right.  4.Paranasal sinus mucosal thickening with trace air-fluid levels which could indicate acute sinusitis\".  " "Per ER documentation, neurosurgery was consulted and recommended MRI in the morning, and treatment with IV Keppra loading dose and IV Decadron 10 mg every 6 hours.  Patient was in c-collar on arrival, after chart review this was applied during emergency room visit in January of this year (see below).     Speaking with patient's son Macario, he states that his father \"has been declining for over a year\".  States that he has had to bathe his father, help him eat, and dress him.  States that he has been trying to get him placed in an assisted living or establish home health.  On assessment patient's pupils are unequal, and he only responds to vigorous stimulation.  He is oxygenating, and an ABG did not show hypoxia or hypercapnia.  MRI was obtained which showed \"large mass centered in the left parietal white matter and extensively involving the corpus callosum crossing to the right parietal white matter with extensive surrounding signal abnormality concerning for infiltrating tumor. This is most consistent with a high-grade glioma. There is extensive intratumoral susceptibility artifact suggesting hemorrhagic component. There is significant mass effect with sulcal effacement on the left and partial effacement of the right lateral ventricle. There is some enlargement of the left temporal horn, which could suggest a trapped component. There is 4 mm rightward shift of midline structures due to mass effect. No herniation. No evidence of acute infarct or acute intracranial hemorrhage\".  Neurosurgery was contacted concerning these findings, and instructed to continue with current regimen of decadron.  Neurosurgery evaluating patient, recommending IV steroids and hypertonic IVFs and monitoring.  Hospice consulted upon family request.  Will hold on hypertonic IVF until decision made whether patient will discharge home with hospice or continue treatment.     Significant Events      02/17/24 : Mentation waxing and waning, restless " and agitated at times. Tolerating room air.  3% saline discontinued per QI.  CT C/A/P did not reveal any obvious metastatic disease.  MRI brain has not been done due to pt agitation and movement.  Stable for transfer out of the ICU.     Of note, patient was deemed ineligible for GIP hospice, would have to be discharged home with hospice.  Hospice discussed with patient's family.  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   -Repeat MRI brain, has not been done due to patient agitation, movement would produce low quality diagnostic imaging  -CT C/A/P 2/16 with no obvious metastatic disease  -Neurosurgery following.  Await results of MRI brain for further treatment/surgical recommendations  -IV Decadron every 6 hours  -IV Keppra for seizure prophylaxis  -Hypertonic saline discontinued, goal Na of 145-150 has been stable.  -Heme/Onc following for treatment recommendations   -Palliative care consulted as requested, doesn't meet GIP criteria, but may discharge home with hospice.     Rhabdomyolysis  -Likely traumatic, patient with multiple falls per documentation  -CK 4195, repeat CK  2755  -Patient does not meet SIRS criteria, do not suspect infection at this time  -Continue IVFs.     Steroid induced hyperglycemia  Add SSI q6H     Chronic:  Hypertension: home antihypertensive of Toprol XL.   Due to marginal hypotension restarted BB at low-dose   COPD: Not in exacerbation, oxygen supplementation as needed. Tolerating room air  CAD: Continue home aspirin once able clinically appropriate  Anxiety/depression: Resume home citalopram, hold remeron for now  Vitamin D deficiency: restart home cyanocobalamin once clinically appropriate  Peripheral neuropathy/RLS: Continue gabapentin, zanaflex when appropriate. Currently held for neuro exams and wakefulness  Enlarged prostate: Continue Flomax when able to take PO medications.     Disposition:  NABIL.  Possible discharge home with hospice if treatment showing no improvement.  Hospice following.     Code status:   Medical Intervention Limits: NO intubation (DNI)  Code Status (Patient has no pulse and is not breathing): No CPR (Do Not Attempt to Resuscitate)  Medical Interventions (Patient has pulse or is breathing): Limited Support        Nutrition:   NPO Diet NPO Type: Strict NPO   Diet, Tube Feeding Tube Feeding Formula: Nutren 2.0 (TwoCal); Tube Feeding Type: Continuous; Continuous Tube Feeding Start Rate (mL/hr): 30; Then Advance Rate By (mL/hr): 10; Every __ Hours: 6; To Goal Rate of (mL/hr): 50      DVT prophylaxis:  Mechanical DVT prophylaxis orders are present.           Subjective / Review of systems      Review of Systems   Unable to perform ROS: Mental status change   Nonverbal      Objective / Physical Exam      Vital signs:  Temp: 98.5 °F (36.9 °C)  BP: 126/70  Heart Rate: (!) 121  Resp: 24  SpO2: 95 %  Weight: 56.6 kg (124 lb 12.5 oz)     Admission Weight: Weight: 70.3 kg (155 lb)  Current Weight: Weight: 56.6 kg (124 lb 12.5 oz)     Input/Output in last 24 hours:     Intake/Output Summary (Last 24 hours) at 2/17/2024 0816  Last data filed at 2/17/2024 0345      Gross per 24 hour   Intake 1817 ml   Output --   Net 1817 ml      Net IO Since Admission: 3,835 mL [02/17/24 0816]      Physical Exam  Vitals and nursing note reviewed.   Constitutional:        Appearance: He is ill-appearing. He is not diaphoretic.      Comments: Lethargic at rest, agitated with stimulation.  Appears older than stated age. Cachectic and emaciated    HENT:      Head: Normocephalic and atraumatic.      Comments: Bitemporal wasting. Periorbital tissue wasting     Nose: Nose normal.      Mouth/Throat:      Mouth: Mucous membranes are dry.   Eyes:      General: No scleral icterus.     Conjunctiva/sclera: Conjunctivae normal.      Pupils: Pupils are equal, round, and reactive to light.      Comments: Pupils dilated but equal and briskly responsive   Cardiovascular:      Heart sounds: Normal heart sounds. No murmur heard.     Comments: SR-ST  Pulmonary:      Effort: Pulmonary effort is normal. No respiratory distress.      Breath sounds: Normal breath sounds. No wheezing, rhonchi or rales.      Comments: Bases diminished  Abdominal:      General: There is no distension.      Tenderness: There is no guarding.      Comments: Scaphoid   Hypoactive bowel sounds   Musculoskeletal:         General: No deformity.      Cervical back: Neck supple. No rigidity.      Right lower leg: No edema.      Left lower leg: No edema.   Skin:     General: Skin is warm and dry.      Coloration: Skin is pale.   Neurological:      Comments: Nonverbal to conversation, unable to assess orientation. Lethargic, restless at times. Moves all extremities spontaneously. Not following commands.  W/d from noxious stimuli   Psychiatric:      Comments: Anxious/restless.            Radiology and Labs               Results from last 7 days   Lab Units 02/17/24  0601 02/16/24  0354 02/15/24  0441 02/14/24  0551 02/13/24  0433   WBC 10*3/mm3 12.20* 8.00 7.80 13.40* 9.90   HEMOGLOBIN g/dL 11.0* 10.6* 9.7* 9.8* 10.5*   HEMATOCRIT % 34.8* 33.4* 29.5* 30.0* 31.9*   PLATELETS 10*3/mm3 189 196 207 199 182                             Results from last 7 days   Lab Units 02/17/24  0750 02/17/24  0601 02/17/24  0003 02/16/24 2048  02/16/24  1212 02/16/24  0810 02/16/24  0354 02/16/24  0101 02/15/24  0827 02/15/24  0441 02/14/24  0801 02/14/24  0551 02/13/24  1014 02/13/24  0433   SODIUM mmol/L 145 146* 146*  --  145 148* 147* 144   < > 141   < > 136   < > 132*   POTASSIUM mmol/L  --  4.2  --  4.5 3.6 3.8 4.3 3.9   < > 4.0   < > 4.5   < > 4.3   CHLORIDE mmol/L  --  112*  --   --  110* 112* 112* 110*   < > 107   < > 101   < > 95*   CO2 mmol/L  --  28.0  --   --  27.0 25.0 24.0 25.0   < > 25.0   < > 23.0   < > 20.0*   BUN mg/dL  --  36*  --   --  28* 31* 31* 31*   < > 29*   < > 26*   < > 29*   CREATININE mg/dL  --  0.73*  --   --  0.57* 0.63* 0.63* 0.61*   < > 0.67*   < > 0.64*   < > 0.72*   GLUCOSE mg/dL  --  152*  --   --  150* 161* 142* 184*   < > 151*   < > 127*   < > 121*   MAGNESIUM mg/dL  --  2.1  --   --   --   --  2.0  --   --  2.0  --  2.0  --  1.9   PHOSPHORUS mg/dL  --  2.1*  --   --   --  3.0 2.1*  --   --  2.6  --  2.9  --  4.2    < > = values in this interval not displayed.               Results from last 7 days   Lab Units 02/17/24  0601 02/16/24  0354 02/15/24  0441 02/14/24  0551 02/13/24  0433   ALK PHOS U/L 121* 143* 148* 168* 199*   AST (SGOT) U/L 50* 89* 136* 145* 161*   ALT (SGPT) U/L 78* 96* 98* 74* 62*           Results from last 7 days   Lab Units 02/12/24 2001   PH, ARTERIAL pH units 7.478*   PCO2, ARTERIAL mm Hg 23.1*   PO2 ART mm Hg 132.2*   O2 SATURATION ART % 99.3*   FIO2 % 28   HCO3 ART mmol/L 17.1*   BASE EXCESS ART mmol/L -4.9*         CT Chest With Contrast Diagnostic     Result Date: 2/16/2024  Impression: 1.No definitive metastatic disease in the chest, abdomen, or pelvis identified. 2.There is 7 mm nonspecific right hepatic lobe hypodensity, likely a cyst although too small to characterize. 3.Subacute to more chronic right-sided rib fractures. Correlate with patient history. Electronically Signed: Louis Richards MD  2/16/2024 11:27 AM EST  Workstation ID: [id number]     CT Abdomen Pelvis With Contrast      Result Date: 2/16/2024  Impression: 1.No definitive metastatic disease in the chest, abdomen, or pelvis identified. 2.There is 7 mm nonspecific right hepatic lobe hypodensity, likely a cyst although too small to characterize. 3.Subacute to more chronic right-sided rib fractures. Correlate with patient history. Electronically Signed: Louis Richards MD  2/16/2024 11:27 AM EST  Workstation ID: [id number]         Current medications      Scheduled Meds:   citalopram, 20 mg, Nasogastric, Nightly  dexAMETHasone, 4 mg, Intravenous, Q6H  insulin lispro, 2-9 Units, Subcutaneous, Q6H  levETIRAcetam, 500 mg, Intravenous, Q12H  metoprolol tartrate, 12.5 mg, Nasogastric, Q12H  midazolam, 2 mg, Intravenous, Once  sodium chloride, 10 mL, Intravenous, Q12H  sodium phosphate, 15 mmol, Intravenous, Once           Continuous Infusions:            Plan discussed with RN. Reviewed all other data in the last 24 hours, including but not limited to vitals, labs, microbiology, imaging and pertinent notes from other providers.

## 2024-02-19 NOTE — NURSING NOTE
Pt being very restless and wont stay in one position, pt slides down the bed to near flat position continuously and therefore is an aspiration risk with enteral nutrition. TF stopped, providers and dietary notified.

## 2024-02-19 NOTE — PLAN OF CARE
Goal Outcome Evaluation:               I talked with pt's poa in the morning, poa said he would come in and discuss with neurosurgery the potential treatment plans and make a decision on whether to go with aggressive treatment on withdrawal of treatment. Poa never did come by but a representative from [employer] did show me an email that the poa was discussing transitioning to comfort care earlier with another representative from [employer]. Pt's vss, TF paused for aspiration risk.

## 2024-02-19 NOTE — CONSULTS
"Palliative Care Consultation    Patient Name: Jersey Celaya  : 1955  MRN: [id number]  Allergies: Pregabalin and Gluten meal    Requesting clinician:  Rommel  Reason for consult: Consultation for clarification of goals of care and code status.      Patient Code Status:   Code Status and Medical Interventions:   Ordered at: 24 1032     Medical Intervention Limits:    NO intubation (DNI)     Code Status (Patient has no pulse and is not breathing):    No CPR (Do Not Attempt to Resuscitate)     Medical Interventions (Patient has pulse or is breathing):    Limited Support           Chief Complaint:    Fall     History of Present Illness    Jersey Celaya is a 68 y.o. male who presented to [facility] ED on  with reports of a fall. Patient minimally able to participate in conversation at time of presentation due to clinical status and baseline medical history.     In ED: WBC 12.3, Platelets 217, Glucose 103, Na 132, ALT 50, Alk phos 213, proBNP 4195    UA with protein, ketones, blood    CT head was obtained which showed \"Findings suspicious for a mass in the posterior left periventricular white matter with surrounding edema, and mass effect. There is effacement of the posterior left lateral ventricle, effacement of the left posterior cerebral sulci, and 2-3 mm rightward midline shift. Finding is indeterminate but concerning for neoplasm or abscess. MRI with and without contrast is recommended for further evaluation. 2.There appears to be slight peripheral hyperdensity of the mass, and a component of hemorrhage cannot be excluded. This does not appear typical of an acute intraparenchymal hemorrhage, and no other hemorrhage is seen at this time.  3.Opacification of the right mastoid air cells and middle ear spaces with absence of the ossicles, as before. There is also hyperdense attenuation in the judah of the right external auditory canal and posteriorly along the right mastoid. Findings could be  related to " "chronic otomastoiditis with possible postoperative changes on the right.  4.Paranasal sinus mucosal thickening with trace air-fluid levels which could indicate acute sinusitis\".      Neurosurgery consulted, recommended MRI. Started on Keppra and steroids.             VITAL SIGNS:   Temp:  [97.8 °F (36.6 °C)-99.2 °F (37.3 °C)] 99.2 °F (37.3 °C)  Heart Rate:  [] 128  Resp:  [20-24] 20  BP: ()/(55-81) 102/55       PMH: CAD, COPD, Depression, HLD, HTN    Past Surgical History:   Procedure Laterality Date    CARDIAC CATHETERIZATION N/A 3/22/2021    Procedure: Left Heart Cath with angiogram;  Surgeon: Mack French MD;  Location: [facility] CATH INVASIVE LOCATION;  Service: Cardiovascular;  Laterality: N/A;    CARDIAC CATHETERIZATION N/A 3/22/2021    Procedure: Left ventriculography;  Surgeon: Mack French MD;  Location: [facility] CATH INVASIVE LOCATION;  Service: Cardiovascular;  Laterality: N/A;    CERVICAL SPINE SURGERY      INNER EAR SURGERY      WISDOM TOOTH EXTRACTION         Family History   Problem Relation Age of Onset    Cancer Mother     Hypertension Mother     Cancer Father        Social History     Tobacco Use    Smoking status: Every Day     Packs/day: .5     Types: Cigarettes    Smokeless tobacco: Never   Vaping Use    Vaping Use: Never used   Substance Use Topics    Alcohol use: Never    Drug use: Yes     Types: Amphetamines, Marijuana     Comment: last used meth smoked 1/16/24           LABS:    Results from last 7 days   Lab Units 02/18/24  0556   WBC 10*3/mm3 16.60*   HEMOGLOBIN g/dL 11.8*   HEMATOCRIT % 37.4*   PLATELETS 10*3/mm3 127*     Results from last 7 days   Lab Units 02/19/24  0640   SODIUM mmol/L 154*   POTASSIUM mmol/L 4.4   CHLORIDE mmol/L 117*   CO2 mmol/L 27.0   BUN mg/dL 70*   CREATININE mg/dL 1.09   GLUCOSE mg/dL 170*   CALCIUM mg/dL 8.4*     Results from last 7 days   Lab Units 02/19/24  0640   SODIUM mmol/L 154*   POTASSIUM mmol/L 4.4   CHLORIDE mmol/L 117*   CO2 mmol/L 27.0 "   BUN mg/dL 70*   CREATININE mg/dL 1.09   CALCIUM mg/dL 8.4*   BILIRUBIN mg/dL 0.4   ALK PHOS U/L 96   ALT (SGPT) U/L 55*   AST (SGOT) U/L 27   GLUCOSE mg/dL 170*         IMAGING STUDIES:  XR Abdomen KUB    Result Date: 2/19/2024  Impression: Enteric tube within the stomach. Electronically Signed: Venita Mcwilliams MD  2/19/2024 2:48 AM EST  Workstation ID: [id number]    MRI Brain Without Contrast    Result Date: 2/17/2024  Extremely limited examination for assessment of treatment response. There is significant patient motion in most of the imaging sequences, and IV contrast was not administered as the patient was unable to sign contrast consent. Complex mass lesion is seen centered about the left parieto-occipital region crossing the midline along the splenium of the corpus callosum. Extensive surrounding vasogenic edema, most pronounced in the left parieto-occipital region. The dominant left-sided component measuring 5.9 x 2.7 cm and the smaller right-sided component measuring approximately 2.8 x 1.5 cm. This appears similar or slightly smaller in comparison with 2/13/2024. Of note, the SWI signal hypointensity within the lesion has decreased and is now relatively confined to the periphery of the lesion. 3 to 4 mm left right midline shift is again suggested. No findings to suggest acute infarct. Consider repeat examination, possibly utilizing patient sedation. Please see above for additional details. Electronically Signed: Toni De La Fuente MD  2/17/2024 5:33 PM EST  Workstation ID: [id number]       I reviewed the patient's new clinical results including labs, imaging, and vitals.        Scheduled Meds:  citalopram, 20 mg, Nasogastric, Nightly  dexAMETHasone, 4 mg, Intravenous, Q6H  insulin lispro, 2-9 Units, Subcutaneous, Q6H  levETIRAcetam, 500 mg, Oral, Q12H  metoprolol tartrate, 25 mg, Nasogastric, Q12H  midazolam, 2 mg, Intravenous, Once  sodium chloride, 10 mL, Intravenous, Q12H      Continuous Infusions:       I have  "reviewed patient's current medication list.     Review of Systems   Unable to perform ROS: Mental status change   All other systems reviewed and are negative.        Physical Exam  Vitals and nursing note reviewed.   Constitutional:       Appearance: He is ill-appearing.   HENT:      Head: Normocephalic and atraumatic.      Nose: Nose normal.      Mouth/Throat:      Mouth: Mucous membranes are dry.      Pharynx: Oropharynx is clear.   Eyes:      Conjunctiva/sclera: Conjunctivae normal.   Cardiovascular:      Rate and Rhythm: Tachycardia present.      Pulses: Normal pulses.   Pulmonary:      Effort: Pulmonary effort is normal.   Abdominal:      Palpations: Abdomen is soft.   Musculoskeletal:         General: Normal range of motion.      Cervical back: Normal range of motion.   Skin:     General: Skin is dry.      Coloration: Skin is pale.   Neurological:      General: No focal deficit present.      Mental Status: He is alert.             PROBLEM LIST:    AMS (altered mental status)    Severe malnutrition          ASSESSMENT/PLAN:    Altered mental status: CT head was obtained which showed \"Findings suspicious for a mass in the posterior left periventricular white matter with surrounding edema, and mass effect. There is effacement of the posterior left lateral ventricle, effacement of the left posterior cerebral sulci, and 2-3 mm rightward midline shift. Finding is indeterminate but concerning for neoplasm or abscess. MRI with and without contrast is recommended for further evaluation. 2.There appears to be slight peripheral hyperdensity of the mass, and a component of hemorrhage cannot be excluded. This does not appear typical of an acute intraparenchymal hemorrhage, and no other hemorrhage is seen at this time.  3.Opacification of the right mastoid air cells and middle ear spaces with absence of the ossicles, as before. There is also hyperdense attenuation in the judah of the right external auditory canal and " "posteriorly along the right mastoid. Findings could be  related to chronic otomastoiditis with possible postoperative changes on the right.  4.Paranasal sinus mucosal thickening with trace air-fluid levels which could indicate acute sinusitis\".  Started on IV keppra and steroids. Neurosurgery consulted, also hem onc. Likely high grade glio.     Rhabdomyolysis: with multiple falls. CK 4195. On IV fluids.     HTN/COPD/CAD/Anxiety: chronic conditions per primary.     These illnesses and their management contribute to the need for a palliative consult and advanced care planning.      ADVANCED CARE PLANNIN/19 Patient is a DNR/DNI. Per family, patient has been steadily declining for several months now, that he is minimally able to do things on his own. They are not interested in extensive workup and are interested in hospice services for the patient. [facility] hospice following, planning to meet with family this am.       Advanced Directives: Patient does not have advance directive  Health Care Directive on file: No  Health Care Surrogate:      Palliative Performance Scale Score:    Comments:           Decisional Capacity: no  Patient's understanding of illness: unknown  Patient goals of care:  DNR/DNI      Thank you for this consult and allowing us to participate in patient's plan of care. Palliative Care Team will continue to follow patient.       I spent 48 minutes reviewing providers documentation, medication records, assessing and examining patient, discussing with family, answering questions, providing guidance about a plan of care, and coordinating care with other healthcare members. More than 50% of time spent face to face discussing disease education, current clinical status, and medication management.       Sharita Wasserman, APRN  2024  "

## 2024-02-19 NOTE — NURSING NOTE
On call provider notified of elevated sodium. Writer instructed to give patient a one time flush of 100ml of water through his NG tube. Sodium to be rechecked in the AM.

## 2024-02-19 NOTE — CASE MANAGEMENT/SOCIAL WORK
Continued Stay Note   Mika     Patient Name: Jersey Celaya  MRN: [id number]  Today's Date: 2/19/2024    Admit Date: 2/12/2024    Plan: Patient lives alone, pending clinical course and PT/OT eval. SNF preference [facility]. Precert required. PASRR required.   Discharge Plan       Row Name 02/19/24 1347       Plan    Plan Patient lives alone, pending clinical course and PT/OT eval. SNF preference [facility]. Precert required. PASRR required.    Plan Comments DC Barrier: [facility] following (not GIP appropriate at this time), PICC, repeat MRI Brain pending (?Lymphoma), possible biopsy mass, Neuro Sx/Palliative/HemOnc/Cardio/Nephrology following.               Expected Discharge Date and Time       Expected Discharge Date Expected Discharge Time    Feb 20, 2024               MARA Bocanegra RN  SIPS/ICU   O: [phone]  C: [phone]  [email or web address]

## 2024-02-19 NOTE — PROGRESS NOTES
Neurosurgery Progress Note    Date: 24     Patient: Jersey Celaya   Sex: male   : 1955      SUBJECTIVE    CC: Altered mental status    Interval history:  No significant changes to mental status or general neurologic function today.         Current Medications:  Scheduled Meds:citalopram, 20 mg, Nasogastric, Nightly  dexAMETHasone, 4 mg, Intravenous, Q6H  insulin lispro, 2-9 Units, Subcutaneous, Q6H  levETIRAcetam, 500 mg, Oral, Q12H  metoprolol tartrate, 25 mg, Nasogastric, Q12H  midazolam, 2 mg, Intravenous, Once  sodium chloride, 10 mL, Intravenous, Q12H      Continuous Infusions:     PRN Meds:   senna-docusate sodium **AND** polyethylene glycol **AND** bisacodyl **AND** bisacodyl    Calcium Replacement - Follow Nurse / BPA Driven Protocol    dextrose    dextrose    glucagon (human recombinant)    hydrALAZINE    Magnesium Standard Dose Replacement - Follow Nurse / BPA Driven Protocol    metoprolol tartrate    nitroglycerin    ondansetron ODT **OR** ondansetron    Phosphorus Replacement - Follow Nurse / BPA Driven Protocol    Potassium Replacement - Follow Nurse / BPA Driven Protocol    [COMPLETED] Insert Peripheral IV **AND** sodium chloride    sodium chloride    sodium chloride    sodium chloride    temazepam      OBJECTIVE  Vitals:    24 0500 24 0600 24 0800 24 1200   BP: 100/72 102/55     Pulse: 117 (!) 128     Resp:       Temp:   99.2 °F (37.3 °C) 98.6 °F (37 °C)   TempSrc:   Axillary Axillary   SpO2: 97% 95%     Weight:       Height:           Physical exam    General  -  awake, slightly agitated  -Confused  HEENT  - Normocephalic, atraumatic  - PERRLA, EOM intact  Respiratory  - Normal respiratory rate and effort  Musculoskeletal  - Range of motion and strength intact all 4 extremities  Skin  - Warm and dry, no bleeding, bruising, or rash  NEUROLOGIC  - Awake, oriented to self  - GCS: 13 (4-4-5)  - Moves all extremities symmetrically with good strength  - Does not  follow commands      Results review  CBC          2/17/2024    06:01 2/18/2024    05:56 2/19/2024    10:31   CBC   WBC 12.20  16.60  14.00    RBC 3.43  3.64  3.77    Hemoglobin 11.0  11.8  12.2    Hematocrit 34.8  37.4  38.9    .4  102.7  103.0    MCH 32.1  32.5  32.3    MCHC 31.7  31.6  31.4    RDW 21.1  21.7  21.9    Platelets 189  127  95        BMP          2/17/2024    00:03 2/17/2024    06:01 2/17/2024    07:50 2/17/2024    20:20 2/18/2024    05:56 2/18/2024    21:21 2/19/2024    06:40   BMP   BUN  36    47   70    Creatinine  0.73    0.72   1.09    Sodium 146  146  145  147  149     149  153  154    Potassium  4.2    4.3   4.4    Chloride  112    114   117    CO2  28.0    28.0   27.0    Calcium  8.6    8.4   8.4             CT Cervical Spine Without Contrast    Result Date: 2/12/2024  CT CERVICAL SPINE WO CONTRAST Date of Exam: 2/12/2024 7:10 PM EST Indication: fall. Comparison: CT of the cervical spine performed on January 17, 2024 Technique: Axial CT images were obtained of the cervical spine without contrast administration.  Sagittal and coronal reconstructions were performed.  Automated exposure control and iterative reconstruction methods were used. Findings: No evidence of acute fracture or traumatic subluxation.  No evidence of aggressive lesions. The prevertebral soft tissues appear unremarkable.Surrounding soft tissues appear within normal limits. Again noted are postsurgical changes of ACDF from C3-C6. Patient is post C4 corpectomy. Osseous fusion at C6-C7 is again visualized. No CT evidence of high-grade canal stenosis. Streak artifact from hardware limits evaluation. The lung apices appear clear. Partially calcified right thyroid lobe nodule is again visualized. Correlate with ultrasound if not already performed.     Impression: Impression: No evidence of acute fracture or traumatic subluxation of the cervical spine. Postsurgical changes from C3-C6. Stable partially calcified right thyroid  lobe nodule. Correlate with ultrasound if not already performed. Electronically Signed: Emil Norwood MD  2/12/2024 7:42 PM EST  Workstation ID: [id number]      CT Head Without Contrast    Result Date: 2/12/2024  CT HEAD WO CONTRAST Date of Exam: 2/12/2024 7:10 PM EST Indication: fall. Comparison: January 17, 2024 Technique: Axial CT images were obtained of the head without contrast administration.  Coronal reconstructions were performed.  Automated exposure control and iterative reconstruction methods were used. Findings: There is hypoattenuation with mass effect in the posterior left periventricular white matter in the area of the left posterior lateral ventricle. There is effacement of the posterior left lateral ventricle. Within the area of hypoattenuation there appears to be an ill-defined mass estimated to measure up to 3-4 cm in diameter. There is some peripheral hyperdense attenuation. These findings represent an interval change. There is effacement of posterior left sulci. There is estimated to be approximately 2-3 mm rightward midline shift at the level of the foramen of Garcia. No definite extra-axial collection is seen. No other definite mass or significant edema. No significant hemorrhage is seen. The basal cisterns appear patent. There is opacification of the mastoid air cells and middle ear spaces, as before. Right ossicles are not visualized. There is hyperdense attenuation in the area of the right external auditory canal and posteriorly along the right mastoid. There is partial opacification and mucosal thickening of the ethmoid sinuses. Trace air-fluid levels are seen in the right frontal, sphenoid, and maxillary sinuses. No definite acute calvarial abnormality. There is a posterior right scalp contusion/hematoma. There also appears to be a small left frontal scalp contusion.     Impression: Impression: 1.Findings suspicious for a mass in the posterior left periventricular white matter with  surrounding edema, and mass effect. There is effacement of the posterior left lateral ventricle, effacement of the left posterior cerebral sulci, and 2-3 mm rightward midline shift. Finding is indeterminate but concerning for neoplasm or abscess. MRI with and without contrast is recommended for further evaluation. 2.There appears to be slight peripheral hyperdensity of the mass, and a component of hemorrhage cannot be excluded. This does not appear typical of an acute intraparenchymal hemorrhage, and no other hemorrhage is seen at this time. 3.Opacification of the right mastoid air cells and middle ear spaces with absence of the ossicles, as before. There is also hyperdense attenuation in the area of the right external auditory canal and posteriorly along the right mastoid. Findings could be  related to chronic otomastoiditis with possible postoperative changes on the right. 4.Paranasal sinus mucosal thickening with trace air-fluid levels which could indicate acute sinusitis. Results were called to the ordering provider by Dr. Louie at 2/12/2024 7:46 PM EST. Electronically Signed: Quincy Louie  2/12/2024 7:57 PM EST  Workstation ID: [id number]   MRI Brain Without Contrast    Result Date: 2/17/2024  MRI BRAIN WO CONTRAST Date of Exam: 2/17/2024 4:20 PM EST Indication: Brain/CNS neoplasm, assess treatment response.  Comparison: Brain MRI dated 2/13/2024 Technique:  Routine multiplanar/multisequence sequence images of the brain were obtained without contrast administration. FINDINGS: Extremely limited examination for assessment of treatment response. There is significant patient motion in most of the imaging sequences, and IV contrast was not administered as the patient was unable to sign contrast consent. Complex mass lesion is seen centered about the left parieto-occipital region crossing the midline along the splenium of the corpus callosum. Extensive surrounding vasogenic edema, most pronounced in the left  parieto-occipital region. The dominant left-sided component measuring 5.9 x 2.7 cm and the smaller right-sided component measuring approximately 2.8 x 1.5 cm. This appears similar or slightly smaller in comparison with 2/13/2024. Of note, the SWI signal hypointensity within the lesion has decreased and is now relatively confined to the periphery of the lesion. 3 to 4 mm left right midline shift is again suggested. Mass effect with sulcal effacement again noted within both parietal lobes. No hydrocephalus is seen. No diffusion restriction is identified to suggest acute infarct. The visualized intracranial flow-voids appear unremarkable. The paranasal sinuses and mastoid air cells show no fluid signal. Orbital structures appear grossly unremarkable. The visualized superficial soft tissues and cervical spine demonstrate no significant abnormality.     Impression: Extremely limited examination for assessment of treatment response. There is significant patient motion in most of the imaging sequences, and IV contrast was not administered as the patient was unable to sign contrast consent. Complex mass lesion is seen centered about the left parieto-occipital region crossing the midline along the splenium of the corpus callosum. Extensive surrounding vasogenic edema, most pronounced in the left parieto-occipital region. The dominant left-sided component measuring 5.9 x 2.7 cm and the smaller right-sided component measuring approximately 2.8 x 1.5 cm. This appears similar or slightly smaller in comparison with 2/13/2024. Of note, the SWI signal hypointensity within the lesion has decreased and is now relatively confined to the periphery of the lesion. 3 to 4 mm left right midline shift is again suggested. No findings to suggest acute infarct. Consider repeat examination, possibly utilizing patient sedation. Please see above for additional details. Electronically Signed: Toni De La Fuente MD  2/17/2024 5:33 PM EST  Workstation  ID: [id number]    MRI Brain With & Without Contrast    Result Date: 2/13/2024  MRI BRAIN W WO CONTRAST Date of Exam: 2/13/2024 1:01 AM EST Indication: Head trauma, skull fracture or hematoma (Age 18-64y).  Comparison: CT head 2/12/2024. Technique:  Routine multiplanar/multisequence sequence images of the brain were obtained before and after the uneventful administration of Prohance. Findings: There is redemonstration of a large mass that appears centered within the left parietal white matter. The mass involves the splenium of the corpus callosum extensively and extends into the posterior body and crosses the midline into the right parietal periventricular white matter. The solid discrete components of the mass measure up to 7.8 x 5.9 cm in the axial plane. There is also a large region of surrounding T2/FLAIR hyperintense signal that is nearly confluent in the periventricular white matter and extends into the body and genu of the corpus callosum, as well as the left frontal periventricular white matter and into the posterior aspects of both basal ganglia. Abnormal signal infiltrates the bilateral parietal and occipital white matter. The mass demonstrates an irregular discontinuous rim of enhancement and also exhibits some diffusion restriction suggesting necrosis and high cellularity. The mass also exhibits extensive susceptibility artifact suggesting tumoral hemorrhage. There is significant mass effect in both parietal lobes with sulcal effacement on the left. There is near complete effacement of the posterior aspect of the left lateral ventricle and partial effacement of the right lateral ventricle. There is some enlargement of  the left temporal horn, which could suggest a trapped component. The third and fourth ventricles appear normal size. There is approximately 4 mm rightward shift of midline structures due to mass effect. There is no evidence of an acute infarct. No herniation. There appears to be postoperative  change at the right mastoid. The left mastoid is underpneumatized. There is mild paranasal sinus mucosal disease. There is scalp edema most pronounced at the right parieto-occipital region. The orbits appear unremarkable. Cerebellum is within normal limits. There are cervical degenerative and postoperative findings.     Impression: Impression: 1.Large mass centered in the left parietal white matter and extensively involving the corpus callosum crossing to the right parietal white matter with extensive surrounding signal abnormality concerning for infiltrating tumor. This is most consistent with a high-grade glioma. 2.There is extensive intratumoral susceptibility artifact suggesting hemorrhagic component. 3.There is significant mass effect with sulcal effacement on the left and partial effacement of the right lateral ventricle. There is some enlargement of the left temporal horn, which could suggest a trapped component. 4.There is 4 mm rightward shift of midline structures due to mass effect. No herniation. 5.No evidence of acute infarct or acute intracranial hemorrhage. Electronically Signed: Dieter Quinones MD  2/13/2024 1:30 AM EST  Workstation ID: [id number]       ASSESSMENT/PLAN  This is a 68 y.o. 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  His neurologic status remains largely unchanged with generalized cognitive deficits but good sensorimotor function. Repeat MRI was completed, but without contrast.  There is also substantial motion artifact.  Overall it is unhelpful in terms of assessing response to steroid treatment.      Palliative care has been consulted and their note was reviewed.  Appears family is not interested in further workup and is planning for hospice.  Nothing further to offer from a neurosurgical standpoint.  Neurosurgery will sign off at this time.  Call with any  questions or concerns.      I discussed my assessment and recommendations with Dr. Kaleb Matos PA-C  02/19/24  13:15 EST      Part of this note may be an electronic transcription/translation of spoken language to printed text using the Dragon Dictation System.

## 2024-02-19 NOTE — CONSULTS
INITIAL CONSULT NOTE      Patient Name: Jersey Celaya  : 1955  MRN: [id number]  Primary Care Physician: Shailesh Thapa MD  Date of admission: 2024    Patient Care Team:  Shailesh Thapa MD as PCP - General  Mack French MD as Consulting Physician (Cardiology)        Reason for Consult:       High Na   Subjective   History of Present Illness:   Chief Complaint:   Chief Complaint   Patient presents with    Fall     HISTORY:  Jersey Celaya is a 68 y.o. male with past medical history of hypertension, COPD, coronary artery disease, anxiety, cervical degeneration,. who presents with change mental status decreased level of consciousness.  MRI without contrast showed density of the mass with a component of the hemorrhage.  Neurosurgery on the case want to have the sodium level around 145.  But sodium level that was 149 yesterday increased to 153 today.  Patient is on free water that was not given properly.  Patient to have equal pupils.  Patient is on Keppra and also on Decadron to decrease the cerebral edema.  Palliative is on the case.  No definite primary tumor in chest or abdomen on CAT scan.  Last echocardiogram unremarkable            Review of systems:    Constitutional: No fever, no chills, no lethargy, no weakness.  HEENT:  No headache, otalgia, itchy eyes, nasal discharge or sore throat.  Cardiac:  No chest pain, dyspnea, orthopnea or PND.  Chest:              No cough, phlegm or wheezing.  Abdomen:  No abdominal pain, nausea or vomiting.  Neuro:  No focal weakness, abnormal movements orseizure like activity.  :   No hematuria, no pyuria, no dysuria, no flank pain.  ROS was otherwise negative except as mentioned in the [place].       Personal History:     Past Medical History:   Past Medical History:   Diagnosis Date    CAD (coronary artery disease)     COPD (chronic obstructive pulmonary disease)     Depression     Depression     Dizziness     Falls     Hyperlipidemia      Hypertension     Low back pain        Surgical History:      Past Surgical History:   Procedure Laterality Date    CARDIAC CATHETERIZATION N/A 3/22/2021    Procedure: Left Heart Cath with angiogram;  Surgeon: Mack French MD;  Location:  [facility];  Service: Cardiovascular;  Laterality: N/A;    CARDIAC CATHETERIZATION N/A 3/22/2021    Procedure: Left ventriculography;  Surgeon: Mack French MD;  Location:  [facility];  Service: Cardiovascular;  Laterality: N/A;    CERVICAL SPINE SURGERY      INNER EAR SURGERY      WISDOM TOOTH EXTRACTION         Family History: family history includes Cancer in his father and mother; Hypertension in his mother. Otherwise pertinent FHx was reviewed and unremarkable.     Social History:  reports that he has been smoking cigarettes. He has been smoking an average of .5 packs per day. He has never used smokeless tobacco. He reports current drug use. Drugs: Amphetamines and Marijuana. He reports that he does not drink alcohol.    Medications:  Prior to Admission medications    Medication Sig Start Date End Date Taking? Authorizing Provider   albuterol sulfate  (90 Base) MCG/ACT inhaler Inhale 2 puffs Every 4 (Four) Hours As Needed for Wheezing.    ProviderNaveen MD   aspirin 81 MG EC tablet Take 1 tablet by mouth Daily.    Naveen Angeles MD   atorvastatin (LIPITOR) 10 MG tablet Take 1 tablet by mouth Every Night.    Naveen Angeles MD   citalopram (CeleXA) 40 MG tablet Take 1 tablet by mouth Every Night. 7/20/04   Naveen Angeles MD   diphenoxylate-atropine (LOMOTIL) 2.5-0.025 MG per tablet Take 1 tablet by mouth 2 (Two) Times a Day As Needed for Diarrhea.    Naveen Angeles MD   ferrous sulfate 324 MG tablet delayed-release Take 1 tablet by mouth Daily With Breakfast. 1/18/24   Pedro Blum PA-C   fluticasone (FLONASE) 50 MCG/ACT nasal spray 1 spray into the nostril(s) as directed by provider Daily.  4/3/20   Naveen Angeles MD   gabapentin (NEURONTIN) 300 MG capsule Take 1 capsule by mouth 3 (Three) Times a Day.    Naveen Angeles MD   meloxicam (MOBIC) 15 MG tablet Take 1 tablet by mouth Daily As Needed. 4/3/20   Naveen Angeles MD   metoprolol succinate XL (TOPROL-XL) 25 MG 24 hr tablet Take 1 tablet by mouth Daily. 2/2/22   Mack French MD   mirtazapine (REMERON) 15 MG tablet Take 1 tablet by mouth Every Night. 4/3/20   Naveen Angeles MD   tamsulosin (FLOMAX) 0.4 MG capsule 24 hr capsule Take 1 capsule by mouth Every Night. 4/3/20   Naveen Angeles MD   temazepam (RESTORIL) 15 MG capsule Take 1 capsule by mouth At Night As Needed for Sleep.    Naveen Angeles MD   tiZANidine (ZANAFLEX) 4 MG tablet Take 1 tablet by mouth Every Night. 4/4/20   Naveen Angeles MD   vitamin B-12 (CYANOCOBALAMIN) 1000 MCG tablet Take 1 tablet by mouth Daily. 1/18/24   Pedro Blum PA-C     Scheduled Meds:citalopram, 20 mg, Nasogastric, Nightly  dexAMETHasone, 4 mg, Intravenous, Q6H  insulin lispro, 2-9 Units, Subcutaneous, Q6H  levETIRAcetam, 500 mg, Oral, Q12H  metoprolol tartrate, 25 mg, Nasogastric, Q12H  midazolam, 2 mg, Intravenous, Once  sodium chloride, 10 mL, Intravenous, Q12H      Continuous Infusions:   PRN Meds:  senna-docusate sodium **AND** polyethylene glycol **AND** bisacodyl **AND** bisacodyl    Calcium Replacement - Follow Nurse / BPA Driven Protocol    dextrose    dextrose    glucagon (human recombinant)    hydrALAZINE    Magnesium Standard Dose Replacement - Follow Nurse / BPA Driven Protocol    metoprolol tartrate    nitroglycerin    ondansetron ODT **OR** ondansetron    Phosphorus Replacement - Follow Nurse / BPA Driven Protocol    Potassium Replacement - Follow Nurse / BPA Driven Protocol    [COMPLETED] Insert Peripheral IV **AND** sodium chloride    sodium chloride    sodium chloride    sodium chloride    temazepam  Allergies:    Allergies   Allergen  Reactions    Pregabalin Swelling    Gluten Meal GI Intolerance       Objective   Exam:     Vital Signs  Temp:  [97.8 °F (36.6 °C)-99.2 °F (37.3 °C)] 99.2 °F (37.3 °C)  Heart Rate:  [] 128  Resp:  [20-24] 20  BP: ()/(55-81) 102/55  SpO2:  [95 %-98 %] 95 %  on   ;   Device (Oxygen Therapy): room air  Body mass index is 17.41 kg/m².  EXAM  General: Elderly thin cachectic white male in no acute distress.  Decreased level of consciousness but arousable  Head:      Normocephalic and atraumatic.    Eyes:      PERRL/EOM intact, conjunctivae and sclerae clear without nystagmus.    Neck:      No masses, thyromegaly,  trachea central   Lungs:    Clear bilaterally to auscultation.    Heart:      Regular rate and rhythm, no murmur no gallop  Abd:        Soft, nontender, not distended, bowel sounds positive, no shifting dullness.  Msk:        No deformity or scoliosis noted of thoracic or lumbar spine.    Pulses:   Pulses normal in all 4 extremities.    Extremities:        No cyanosis or clubbing--no edema.    Neuro:    Moving all the 4 extremities  Skin:       Intact without lesions or rashes.    Psych:    Alert and cooperative; normal mood and affect; normal attention span       Results Review:  I have personally reviewed most recent Data :  [facility]  CBC    Results from last 7 days   Lab Units 02/18/24  0556 02/17/24  0601 02/16/24  0354 02/15/24  0441 02/14/24  0551 02/13/24  0433 02/12/24 2001 02/12/24  1859   WBC 10*3/mm3 16.60* 12.20* 8.00 7.80 13.40* 9.90  --  12.30*   HEMOGLOBIN g/dL 11.8* 11.0* 10.6* 9.7* 9.8* 10.5*  --  10.5*   HEMOGLOBIN, POC g/dL  --   --   --   --   --   --  10.9*  --    PLATELETS 10*3/mm3 127* 189 196 207 199 182  --  217     CMP   Results from last 7 days   Lab Units 02/19/24  0640 02/18/24  2121 02/18/24  0556 02/17/24  2020 02/17/24  0750 02/17/24  0601 02/17/24  0003 02/16/24  2048 02/16/24  1212 02/16/24  0810 02/16/24  0354 02/16/24  0101 02/15/24  0827  02/15/24  0441 02/14/24  0801 02/14/24  0551 02/13/24  1014 02/13/24  0433   SODIUM mmol/L 154* 153* 149*  149* 147* 145 146* 146*  --  145 148* 147* 144   < > 141   < > 136   < > 132*   POTASSIUM mmol/L 4.4  --  4.3  --   --  4.2  --  4.5 3.6 3.8 4.3 3.9   < > 4.0   < > 4.5   < > 4.3   CHLORIDE mmol/L 117*  --  114*  --   --  112*  --   --  110* 112* 112* 110*   < > 107   < > 101   < > 95*   CO2 mmol/L 27.0  --  28.0  --   --  28.0  --   --  27.0 25.0 24.0 25.0   < > 25.0   < > 23.0   < > 20.0*   BUN mg/dL 70*  --  47*  --   --  36*  --   --  28* 31* 31* 31*   < > 29*   < > 26*   < > 29*   CREATININE mg/dL 1.09  --  0.72*  --   --  0.73*  --   --  0.57* 0.63* 0.63* 0.61*   < > 0.67*   < > 0.64*   < > 0.72*   GLUCOSE mg/dL 170*  --  150*  --   --  152*  --   --  150* 161* 142* 184*   < > 151*   < > 127*   < > 121*   ALBUMIN g/dL 3.3*  --  3.5  --   --  3.7  --   --   --   --  3.4*  --   --  3.5  --  3.5  --  3.8   BILIRUBIN mg/dL 0.4  --  0.4  --   --  0.4  --   --   --   --  0.5  --   --  0.6  --  0.7  --  0.8   ALK PHOS U/L 96  --  103  --   --  121*  --   --   --   --  143*  --   --  148*  --  168*  --  199*   AST (SGOT) U/L 27  --  40  --   --  50*  --   --   --   --  89*  --   --  136*  --  145*  --  161*   ALT (SGPT) U/L 55*  --  70*  --   --  78*  --   --   --   --  96*  --   --  98*  --  74*  --  62*    < > = values in this interval not displayed.     ABG    Results from last 7 days   Lab Units 02/12/24 2001   PH, ARTERIAL pH units 7.478*   PCO2, ARTERIAL mm Hg 23.1*   PO2 ART mm Hg 132.2*   O2 SATURATION ART % 99.3*   BASE EXCESS ART mmol/L -4.9*     XR Abdomen KUB    Result Date: 2/19/2024  Impression: Enteric tube within the stomach. Electronically Signed: Venita Mcwilliams MD  2/19/2024 2:48 AM EST  Workstation ID: [id number]    MRI Brain Without Contrast    Result Date: 2/17/2024  Extremely limited examination for assessment of treatment response. There is significant patient motion in most of the imaging  sequences, and IV contrast was not administered as the patient was unable to sign contrast consent. Complex mass lesion is seen centered about the left parieto-occipital region crossing the midline along the splenium of the corpus callosum. Extensive surrounding vasogenic edema, most pronounced in the left parieto-occipital region. The dominant left-sided component measuring 5.9 x 2.7 cm and the smaller right-sided component measuring approximately 2.8 x 1.5 cm. This appears similar or slightly smaller in comparison with 2/13/2024. Of note, the SWI signal hypointensity within the lesion has decreased and is now relatively confined to the periphery of the lesion. 3 to 4 mm left right midline shift is again suggested. No findings to suggest acute infarct. Consider repeat examination, possibly utilizing patient sedation. Please see above for additional details. Electronically Signed: Toni De La Fuente MD  2/17/2024 5:33 PM EST  Workstation ID: [id number]    CT Chest With Contrast Diagnostic    Result Date: 2/16/2024  Impression: 1.No definitive metastatic disease in the chest, abdomen, or pelvis identified. 2.There is 7 mm nonspecific right hepatic lobe hypodensity, likely a cyst although too small to characterize. 3.Subacute to more chronic right-sided rib fractures. Correlate with patient history. Electronically Signed: Louis Richards MD  2/16/2024 11:27 AM EST  Workstation ID: [id number]    CT Abdomen Pelvis With Contrast    Result Date: 2/16/2024  Impression: 1.No definitive metastatic disease in the chest, abdomen, or pelvis identified. 2.There is 7 mm nonspecific right hepatic lobe hypodensity, likely a cyst although too small to characterize. 3.Subacute to more chronic right-sided rib fractures. Correlate with patient history. Electronically Signed: Louis Richards MD  2/16/2024 11:27 AM EST  Workstation ID: [id number]    XR Abdomen KUB    Result Date: 2/14/2024  Impression: Enteric tube tip in the antrum of the stomach.  Electronically Signed: Louis Richards MD  2/14/2024 3:03 PM EST  Workstation ID: [id number]    XR Abdomen KUB    Result Date: 2/14/2024  Impression: Enteric tube tip in the proximal stomach. Electronically Signed: Louis Richards MD  2/14/2024 10:55 AM EST  Workstation ID: [id number]    XR Chest 1 View    Result Date: 2/14/2024  Impression: PICC line tip is at the brachiocephalic confluence. Electronically Signed: Chelly Crabtree MD  2/14/2024 9:58 AM EST  Workstation ID: [id number]    MRI Brain With & Without Contrast    Result Date: 2/13/2024  Impression: 1.Large mass centered in the left parietal white matter and extensively involving the corpus callosum crossing to the right parietal white matter with extensive surrounding signal abnormality concerning for infiltrating tumor. This is most consistent with a high-grade glioma. 2.There is extensive intratumoral susceptibility artifact suggesting hemorrhagic component. 3.There is significant mass effect with sulcal effacement on the left and partial effacement of the right lateral ventricle. There is some enlargement of the left temporal horn, which could suggest a trapped component. 4.There is 4 mm rightward shift of midline structures due to mass effect. No herniation. 5.No evidence of acute infarct or acute intracranial hemorrhage. Electronically Signed: Dieter Quinones MD  2/13/2024 1:30 AM EST  Workstation ID: [id number]    CT Head Without Contrast    Result Date: 2/12/2024  Impression: 1.Findings suspicious for a mass in the posterior left periventricular white matter with surrounding edema, and mass effect. There is effacement of the posterior left lateral ventricle, effacement of the left posterior cerebral sulci, and 2-3 mm rightward midline shift. Finding is indeterminate but concerning for neoplasm or abscess. MRI with and without contrast is recommended for further evaluation. 2.There appears to be slight peripheral hyperdensity of the mass, and a component of  hemorrhage cannot be excluded. This does not appear typical of an acute intraparenchymal hemorrhage, and no other hemorrhage is seen at this time. 3.Opacification of the right mastoid air cells and middle ear spaces with absence of the ossicles, as before. There is also hyperdense attenuation in the area of the right external auditory canal and posteriorly along the right mastoid. Findings could be  related to chronic otomastoiditis with possible postoperative changes on the right. 4.Paranasal sinus mucosal thickening with trace air-fluid levels which could indicate acute sinusitis. Results were called to the ordering provider by Dr. Louie at 2/12/2024 7:46 PM EST. Electronically Signed: Quincy Louie  2/12/2024 7:57 PM EST  Workstation ID: [id number]    CT Cervical Spine Without Contrast    Result Date: 2/12/2024  Impression: No evidence of acute fracture or traumatic subluxation of the cervical spine. Postsurgical changes from C3-C6. Stable partially calcified right thyroid lobe nodule. Correlate with ultrasound if not already performed. Electronically Signed: Emil Norwood MD  2/12/2024 7:42 PM EST  Workstation ID: [id number]    XR Chest 1 View    Result Date: 2/12/2024  1.No radiographic findings of acute cardiopulmonary abnormality. 2.Chronic fractures of the lateral right seventh and eighth ribs. Electronically Signed: Quincy Louie  2/12/2024 7:29 PM EST  Workstation ID: [id number]     Results for orders placed during the hospital encounter of 01/17/24    Adult Transthoracic Echo Complete W/ Cont if Necessary Per Protocol    Interpretation Summary    Left ventricular ejection fraction appears to be 56 - 60%.        Assessment & Plan   Assessment and Plan:         AMS (altered mental status)    Severe malnutrition    ASSESSMENT:  Hypernatremia  Acute kidney injury at risk with greater than 30% increase in creatinine in last 24 hours  Brain tumor  History of hypertension  History of coronary artery  disease          PLAN :     Significant fluctuation of the sodium seem to be free water deficit  Will increase free water to every 1 hour at least 50 cc  Target sodium should be around 145 later today  Follow-up with a complete urine studies  Repeat labs at 4 PM  We will try to maintain sodium between 140-1 45 because of the cerebral edema  If needed 3% saline will be given on as needed basis      Mode Mckinley MD  [facility]  2/19/2024  09:45 EST

## 2024-02-20 NOTE — PROGRESS NOTES
"CARDIOLOGY PROGRESS NOTE:    Jersey Celaya  68 y.o.  male  1955  [id number]      Referring Provider: Hospitalist    Reason for follow-up: Sinus tachycardia     Patient Care Team:  Shailesh Thapa MD as PCP - General  Mack French MD as Consulting Physician (Cardiology)    Subjective .  Patient is not responsive at this time    Objective patient does not respond     Review of Systems   Unable to perform ROS: Patient unresponsive       Allergies: Pregabalin and Gluten meal    Scheduled Meds:citalopram, 20 mg, Nasogastric, Nightly  dexAMETHasone, 4 mg, Intravenous, Q6H  insulin lispro, 2-9 Units, Subcutaneous, Q6H  levETIRAcetam, 500 mg, Oral, Q12H  metoprolol tartrate, 25 mg, Nasogastric, Q12H  midazolam, 2 mg, Intravenous, Once  sodium chloride, 10 mL, Intravenous, Q12H      Continuous Infusions:sodium chloride, 100 mL/hr, Last Rate: 100 mL/hr (02/20/24 0837)      PRN Meds:.  senna-docusate sodium **AND** polyethylene glycol **AND** bisacodyl **AND** bisacodyl    Calcium Replacement - Follow Nurse / BPA Driven Protocol    dextrose    dextrose    glucagon (human recombinant)    hydrALAZINE    HYDROmorphone    Magnesium Standard Dose Replacement - Follow Nurse / BPA Driven Protocol    metoprolol tartrate    nitroglycerin    ondansetron ODT **OR** ondansetron    phenol    Phosphorus Replacement - Follow Nurse / BPA Driven Protocol    Potassium Replacement - Follow Nurse / BPA Driven Protocol    [COMPLETED] Insert Peripheral IV **AND** sodium chloride    sodium chloride    sodium chloride    sodium chloride    temazepam        VITAL SIGNS  Vitals:    02/20/24 0600 02/20/24 0700 02/20/24 0716 02/20/24 0800   BP: 124/79 97/70  101/64   Pulse: 87 87  83   Resp:    18   Temp:    96.6 °F (35.9 °C)   TempSrc:    Axillary   SpO2: 92% 94%  92%   Weight:   54.8 kg (120 lb 13 oz)    Height:           Flowsheet Rows      Flowsheet Row First Filed Value   Admission Height 180.3 cm (71\") Documented at 02/12/2024 1841 "   Admission Weight 70.3 kg (155 lb) Documented at 02/12/2024 1841             TELEMETRY: Sinus rhythm    Physical Exam:  Eyes:      General: No scleral icterus.  HENT:      Head: Normocephalic and atraumatic.   Neck:      Vascular: No carotid bruit or JVD.   Pulmonary:      Effort: Pulmonary effort is normal.      Breath sounds: Normal breath sounds. No wheezing. No rales.   Cardiovascular:      Normal rate. Regular rhythm.   Pulses:     Intact distal pulses.   Abdominal:      General: Bowel sounds are normal.      Palpations: Abdomen is soft.   Musculoskeletal: Normal range of motion. Skin:     General: Skin is warm and dry.      Findings: No rash.   Neurological:      Comments: No focal deficits          Results Review:   I reviewed the patient's new clinical results.  Lab Results (last 24 hours)       Procedure Component Value Units Date/Time    CBC & Differential [[id number]]  (Abnormal) Collected: 02/20/24 0418    Specimen: Blood Updated: 02/20/24 0614    Narrative:      The following orders were created for panel order CBC & Differential.  Procedure                               Abnormality         Status                     ---------                               -----------         ------                     CBC Auto Differential[[id number]]        Abnormal            Final result               Scan Slide[[id number]]                                       Final result                 Please view results for these tests on the individual orders.    CBC Auto Differential [[id number]]  (Abnormal) Collected: 02/20/24 0418    Specimen: Blood Updated: 02/20/24 0614     WBC 12.90 10*3/mm3      RBC 3.43 10*6/mm3      Hemoglobin 11.0 g/dL      Hematocrit 35.6 %      .8 fL      MCH 32.0 pg      MCHC 30.8 g/dL      RDW 21.6 %      RDW-SD 77.4 fl      MPV 10.6 fL      Platelets 62 10*3/mm3     Narrative:      The previously reported component NRBC is no longer being reported. Previous result was 0.1 /100 WBC  (Reference Range: 0.0-0.2 /100 WBC) on 2/20/2024 at 0531 EST.    Scan Slide [[id number]] Collected: 02/20/24 0418    Specimen: Blood Updated: 02/20/24 0614     Scan Slide --     Comment: See Manual Differential Results       Manual Differential [[id number]]  (Abnormal) Collected: 02/20/24 0418    Specimen: Blood Updated: 02/20/24 0614     Neutrophil % 70.0 %      Lymphocyte % 2.0 %      Monocyte % 9.0 %      Bands %  11.0 %      Metamyelocyte % 8.0 %      Neutrophils Absolute 10.45 10*3/mm3      Lymphocytes Absolute 0.26 10*3/mm3      Monocytes Absolute 1.16 10*3/mm3      [place] Cells Slight/1+     Dacrocytes Slight/1+     Macrocytes Slight/1+     Poikilocytes Mod/2+     RBC Fragments Slight/1+     Target Cells Slight/1+     Toxic Granulation Slight/1+     Vacuolated Neutrophils Slight/1+     Platelet Estimate Decreased     Giant Platelets Slight/1+    POC Glucose Once [[id number]]  (Abnormal) Collected: 02/20/24 0553    Specimen: Blood Updated: 02/20/24 0556     Glucose 137 mg/dL      Comment: Serial Number: [id number]:  [id number]       Magnesium [[id number]]  (Normal) Collected: 02/20/24 0418    Specimen: Blood Updated: 02/20/24 0502     Magnesium 2.4 mg/dL     Comprehensive Metabolic Panel [[id number]]  (Abnormal) Collected: 02/20/24 0418    Specimen: Blood Updated: 02/20/24 0502     Glucose 157 mg/dL      BUN 60 mg/dL      Creatinine 0.92 mg/dL      Sodium 156 mmol/L      Potassium 4.8 mmol/L      Comment: Slight hemolysis detected by analyzer. Result may be falsely elevated.        Chloride 120 mmol/L      CO2 27.0 mmol/L      Calcium 8.0 mg/dL      Total Protein 4.8 g/dL      Albumin 3.2 g/dL      ALT (SGPT) 44 U/L      AST (SGOT) 21 U/L      Alkaline Phosphatase 97 U/L      Total Bilirubin 0.5 mg/dL      Globulin 1.6 gm/dL      A/G Ratio 2.0 g/dL      BUN/Creatinine Ratio 65.2     Anion Gap 9.0 mmol/L      eGFR 91.2 mL/min/1.73     Narrative:      GFR Normal >60  Chronic Kidney Disease <60  Kidney Failure  <15      Phosphorus [[id number]]  (Abnormal) Collected: 02/20/24 0418    Specimen: Blood Updated: 02/20/24 0500     Phosphorus 4.6 mg/dL     POC Glucose Once [[id number]]  (Abnormal) Collected: 02/19/24 2354    Specimen: Blood Updated: 02/19/24 2356     Glucose 158 mg/dL      Comment: Serial Number: [id number]:  [id number]       Sodium [[id number]]  (Abnormal) Collected: 02/19/24 2135    Specimen: Blood Updated: 02/19/24 2158     Sodium 157 mmol/L     POC Glucose Q6H [[id number]]  (Abnormal) Collected: 02/19/24 1815    Specimen: Blood Updated: 02/19/24 1816     Glucose 161 mg/dL      Comment: Serial Number: [id number]:  [id number]       Basic Metabolic Panel [[id number]]  (Abnormal) Collected: 02/19/24 1637    Specimen: Blood Updated: 02/19/24 1715     Glucose 129 mg/dL      BUN 61 mg/dL      Creatinine 0.87 mg/dL      Sodium 156 mmol/L      Potassium 3.6 mmol/L      Chloride 125 mmol/L      CO2 23.0 mmol/L      Calcium 6.5 mg/dL      BUN/Creatinine Ratio 70.1     Anion Gap 8.0 mmol/L      eGFR 94.6 mL/min/1.73     Narrative:      GFR Normal >60  Chronic Kidney Disease <60  Kidney Failure <15      POC Glucose Q6H [[id number]]  (Abnormal) Collected: 02/19/24 1255    Specimen: Blood Updated: 02/19/24 1257     Glucose 176 mg/dL      Comment: Serial Number: [id number]:  [id number]       CBC & Differential [[id number]]  (Abnormal) Collected: 02/19/24 0640    Specimen: Blood from Arm, Right Updated: 02/19/24 1056    Narrative:      The following orders were created for panel order CBC & Differential.  Procedure                               Abnormality         Status                     ---------                               -----------         ------                     CBC Auto Differential[[id number]]        Abnormal            Final result               Scan Slide[[id number]]                                                                    Please view results for these tests on the individual orders.     CBC Auto Differential [[id number]]  (Abnormal) Collected: 24 1031    Specimen: Blood from Arm, Right Updated: 24 1056     WBC 14.00 10*3/mm3      RBC 3.77 10*6/mm3      Hemoglobin 12.2 g/dL      Hematocrit 38.9 %      .0 fL      MCH 32.3 pg      MCHC 31.4 g/dL      RDW 21.9 %      RDW-SD 77.4 fl      MPV 11.0 fL      Platelets 95 10*3/mm3      Neutrophil % 82.2 %      Lymphocyte % 4.1 %      Monocyte % 13.4 %      Eosinophil % 0.0 %      Basophil % 0.3 %      Neutrophils, Absolute 11.50 10*3/mm3      Lymphocytes, Absolute 0.60 10*3/mm3      Monocytes, Absolute 1.90 10*3/mm3      Eosinophils, Absolute 0.00 10*3/mm3      Basophils, Absolute 0.00 10*3/mm3      nRBC 0.1 /100 WBC             Imaging Results (Last 24 Hours)       ** No results found for the last 24 hours. **            EKG      I personally viewed and interpreted the patient's EKG/Telemetry data:    ECHOCARDIOGRAM:  Results for orders placed during the hospital encounter of 24    Adult Transthoracic Echo Complete W/ Cont if Necessary Per Protocol    Interpretation Summary    Left ventricular ejection fraction appears to be 56 - 60%.       STRESS MYOVIEW:  Results for orders placed during the hospital encounter of 03/15/21    Stress test with myocardial perfusion one day    Interpretation Summary  · Left ventricular ejection fraction is normal. (Calculated EF = 54%).  · Myocardial perfusion imaging indicates a large-sized, severe area of ischemia located in the inferior wall.  · Impressions are consistent with a high risk study.       CARDIAC CATHETERIZATION:  Results for orders placed during the hospital encounter of 21    Cardiac Catheterization/Vascular Study    Narrative  Heart Cath Report    NAME:              Jersey Celaya  :                1955  AGE/SEX:        65 y.o. male  MRN:                [id number]  ADM DATE:      [unfilled]  DOS:      Pre-Procedure Notes  H&P Performed  [x]  Yes []  No       []   N/A    Indications:  []  ACS <= 24 HRS  []  ACS >24 HRS  [x]  New Onset Angina <= 2 mos  []  Worsening Angina  []  Resuscitated Cardiac Arrest  []  Angina on Exertion:  []  Suspected CAD  []  Valvular Disease  []  Pericardial Disease  []  Cardiac Arrythmia  []  Cardiomyopathy  []  LV Dysfunction  []  Syncope  []  Post Cardiac Transplant  []  Eval. For Exercise Clearance  [x]  Abnormal Stress Test  []  Other  []  Pre-Operative Evaluation  If Pre-Op Eval:  Evaluation for Surgery Type:  []  Cardiac Surgery   []  Non-Cardiac Surgery  Functional Capacity:  []  <4 METS  []  >=4 METS w/o symptoms  []  >= 4 METS with symptoms  []  Unknown  Surgical Risk:  []  Low  []  Intermediate  []  High Risk: Vascular  []  High Risk Non-Vascular    Risks, Benefits, & Complications Discussed:  [x]  Yes  []  No  []  N/A    Questions Answered:  [x]  Yes  []  No  []  N/A    Consent Obtained:  [x]  Yes  []  No  []  N/A    CHF: []  Yes  [x]  No  If Yes:  Newly Diagnosed?  []  Yes  []  No  If Yes:  HF Type:  []  Diastolic  []  Systolic  []  Unknown      Procedure Description  The patient underwent successful [x]  Left  []  Right  []  Left & Right  Heart catheterization and coronary angiography via the  [x]  Femoral approach  []  Radial approach  []  Brachial approach    Procedure Narrative:  Informed consent was obtained from the patient after explaining risks and benefits.  Patient was brought to the cardiac catheterization laboratory and placed on the table in the usual fashion.  Right groin was shaved and prepped in sterile fashion. Moderate conscious sedation was given utilizing IV Versed and fentanyl administered by RN with continuous EKG oximetry and hemodynamic monitoring supervised by me throughout the entire case, conscious sedation time was 30 minutes.  I was present with the patient for the duration of moderate sedation and supervised staff who had no other duties and monitored the patient for the entire procedure patient had Grover  2-3 sedation scale. 2% lidocaine was used for local anesthesia to the right groin.  A total of 20 cc was used.  A 6 Marshallese sheath was placed in the right femoral artery.  A 6 Marshallese pigtail catheter, 6 Marshallese JR4 catheter and a 6 Marshallese JL4 catheter was used for the procedure.  After the cardiac catheterization is complete, all the catheters and sheath were removed.  Patient tolerated the procedure very well.  No complications noted.      Hemodynamic    LVEDP:8   Estimated EF %:60    Initial Aortic Pressure: 120/70    AV Gradient: No gradient    Rt. Heart Pressure:    Wall Motion:  Dominance:  []  Left  [x]  Right  []  Co-Dominant    Coronary Arteriography: (Please Code highest degree of stenosis)    Left Main %:   0  Proximal LAD %:  0  Mid/Distal LAD %: 50%  LCX %: 0  Ramus:  RCA %: 20 to 30%  Lima %:  SVG(s) %:      Complications: No complications    Estimated Blood Loss:  None      Impression and Recommendation: Nonobstructive coronary disease  Normal LV systolic function  Continue medical therapy  Electronically signed by Mack French MD, 03/22/21, 12:08 PM EDT       OTHER:         Assessment & Plan     Sinus tachycardia  Patient presented with mental status changes s and had sinus tachycardia and is currently on beta-blockers  No need for further evaluation because hospice consult has been obtained.     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  Neurosurgery has seen and recommended medical treatment with Decadron and Keppra and hypertonic saline  However family requested palliative care.  Hospice has seen the patient and family is yet to decide     Hypertension  Patient's blood pressure is stable on Toprol     Rhabdomyolysis  Patient had several falls and his CK is elevated and we are monitoring the renal function at this time.     History of coronary disease  Patient is a 50% disease  in the LAD and a 20 to 30% RCA and has normal function but will continue medical therapy only.  Will follow as needed    I discussed the patients findings and my recommendations with patient's nurse    Mack French MD  02/20/24  09:59 EST

## 2024-02-20 NOTE — CASE MANAGEMENT/SOCIAL WORK
Case Management/Social Work    Patient Name:  Jersey Celaya  YOB: 1955  MRN: [id number]  Admit Date:  2/12/2024        CM attempted to call son Macario Celaya at [phone] ([phone]), no answer, left voicemail. CM also texted son Macario at [phone], no response at this time.     Kimberley Sprague RN, BSN    [address] [address]  Phone: [phone]  Fax: [phone]

## 2024-02-20 NOTE — CONSULTS
Nutrition Services    Patient Name: Jersey Celaya  YOB: 1955  MRN: [id number]  Admission date: 2/12/2024    Comment:  -- Severe chronic disease related malnutrition related to chronic diseases including COPD as evidenced by severe fat/muscle loss per physical exam.  See MSA 2/13/24.    -- Begin bolus tube feeding of Nutren 2.0 at 275 mL q 4 hours + 150mL/hr water flush before and after each bolus      CLINICAL NUTRITION ASSESSMENT      Reason for Assessment Follow up protocol   2/13: BMI less than 19, MST of 5, Wound      H&P      Past Medical History:   Diagnosis Date    CAD (coronary artery disease)     COPD (chronic obstructive pulmonary disease)     Depression     Depression     Dizziness     Falls     Hyperlipidemia     Hypertension     Low back pain        Past Surgical History:   Procedure Laterality Date    CARDIAC CATHETERIZATION N/A 3/22/2021    Procedure: Left Heart Cath with angiogram;  Surgeon: Mack French MD;  Location:  [facility];  Service: Cardiovascular;  Laterality: N/A;    CARDIAC CATHETERIZATION N/A 3/22/2021    Procedure: Left ventriculography;  Surgeon: Mack French MD;  Location:  [facility];  Service: Cardiovascular;  Laterality: N/A;    CERVICAL SPINE SURGERY      INNER EAR SURGERY      WISDOM TOOTH EXTRACTION          Current Problems   Altered Mental Status  S/p fall at home, recurrent falls per documentation  - Neurosurgery following  - NG for tube feeding placed 2/14    Rhabdomyolysis     Chronic:  Hypertension  COPD  CAD  Anxiety/depression  Vitamin D deficiency       Encounter Information        Trending Narrative     2/20: Since last review, NG tube placed for tube feeding.  Patient received TF from 2/14-2/19.  On 2/19 TF was held due to aspiration risk of patient being non-compliant with HOB at 30 degrees.  TF order discontinued per RN with note stating APRN would like possibility to change to bolus feeds.  RD messaged  "attending MD Carney re: nutrition plan of care.  MD ok with bolus TF.  RD visited patient at bedside.  No family present.  Noted palliative care and hospice to see.      2/13: Admitted for AMS after fall at home.  RD visited patient at bedside.  Patient did not talk.  Son reports recent good PO intakes, no weight loss 1 year, drinks Boost/Ensure.  Son confirms patient has Celiac disease.  NFPE completed, consistent with nutrition diagnosis of malnutrition using AND/ASPEN criteria. See MSA below.  Patient discussed in AM rounds.  Noted with plans for Na 145-147 range.  Noted with aggressive brain tumor.  MD to discuss palliative care with son.         Anthropometrics        Current Height, Weight Height: 180.3 cm (70.98\")  Weight: 54.8 kg (120 lb 13 oz) (02/20/24 0716)       Usual Body Weight (UBW) Unable to obtain from patient        Trending Weight Hx     This admission: 2/20: 120# , 4.7% weight loss x 1 week, +4.7L since admission per I/Os  2/13: 126# (155# stated)             PTA: RD to follow up when re-weight obtained due to large variation     Wt Readings from Last 30 Encounters:   02/20/24 0716 54.8 kg (120 lb 13 oz)   02/17/24 0400 56.6 kg (124 lb 12.5 oz)   02/16/24 0600 56.6 kg (124 lb 12.5 oz)   02/15/24 0523 55.6 kg (122 lb 9.2 oz)   02/14/24 0600 57.7 kg (127 lb 3.3 oz)   02/13/24 0419 57.2 kg (126 lb 1.7 oz)   02/12/24 1841 70.3 kg (155 lb)   02/05/24 1445 70.3 kg (155 lb)   01/18/24 0224 70.5 kg (155 lb 6.8 oz)   01/17/24 1525 68.9 kg (152 lb)   01/17/24 1130 69.3 kg (152 lb 12.5 oz)   01/16/24 1444 70.5 kg (155 lb 8 oz)   12/02/21 1239 73.5 kg (162 lb)   05/06/21 1200 80.7 kg (178 lb)   03/22/21 1015 83 kg (182 lb 15.7 oz)   03/19/21 1235 83.9 kg (185 lb)   06/10/20 1346 86.2 kg (190 lb)   05/20/20 1023 88 kg (194 lb)      BMI kg/m2 Body mass index is 16.86 kg/m².       Labs        Pertinent Labs    Results from last 7 days   Lab Units 02/20/24  0418 02/19/24  2135 02/19/24  1637 02/19/24  0640 " 02/18/24  2121 02/18/24  0556   SODIUM mmol/L 156* 157* 156* 154*   < > 149*  149*   POTASSIUM mmol/L 4.8  --  3.6 4.4  --  4.3   CHLORIDE mmol/L 120*  --  125* 117*  --  114*   CO2 mmol/L 27.0  --  23.0 27.0  --  28.0   BUN mg/dL 60*  --  61* 70*  --  47*   CREATININE mg/dL 0.92  --  0.87 1.09  --  0.72*   CALCIUM mg/dL 8.0*  --  6.5* 8.4*  --  8.4*   BILIRUBIN mg/dL 0.5  --   --  0.4  --  0.4   ALK PHOS U/L 97  --   --  96  --  103   ALT (SGPT) U/L 44*  --   --  55*  --  70*   AST (SGOT) U/L 21  --   --  27  --  40   GLUCOSE mg/dL 157*  --  129* 170*  --  150*    < > = values in this interval not displayed.     Results from last 7 days   Lab Units 02/20/24  0418 02/19/24  1031 02/19/24  0640 02/18/24  0556   MAGNESIUM mg/dL 2.4  --  2.5* 2.3   PHOSPHORUS mg/dL 4.6*  --  4.3 3.4   HEMOGLOBIN g/dL 11.0*   < >  --  11.8*   HEMATOCRIT % 35.6*   < >  --  37.4*    < > = values in this interval not displayed.     Lab Results   Component Value Date    HGBA1C 5.50 01/17/2024        Medications    Scheduled Medications citalopram, 20 mg, Nasogastric, Nightly  dexAMETHasone, 4 mg, Intravenous, Q6H  insulin lispro, 2-9 Units, Subcutaneous, Q6H  levETIRAcetam, 500 mg, Oral, Q12H  metoprolol tartrate, 25 mg, Nasogastric, Q12H  midazolam, 2 mg, Intravenous, Once  sodium chloride, 10 mL, Intravenous, Q12H        Infusions sodium chloride, 100 mL/hr, Last Rate: 100 mL/hr (02/19/24 1819)        PRN Medications   senna-docusate sodium **AND** polyethylene glycol **AND** bisacodyl **AND** bisacodyl    Calcium Replacement - Follow Nurse / BPA Driven Protocol    dextrose    dextrose    glucagon (human recombinant)    hydrALAZINE    HYDROmorphone    Magnesium Standard Dose Replacement - Follow Nurse / BPA Driven Protocol    metoprolol tartrate    nitroglycerin    ondansetron ODT **OR** ondansetron    phenol    Phosphorus Replacement - Follow Nurse / BPA Driven Protocol    Potassium Replacement - Follow Nurse / BPA Driven Protocol     [COMPLETED] Insert Peripheral IV **AND** sodium chloride    sodium chloride    sodium chloride    sodium chloride    temazepam     Physical Findings        Trending Physical   Appearance, NFPE 2/20: NFPE completed, consistent with nutrition diagnosis of malnutrition using AND/ASPEN criteria. See MSA below.     2/13: NFPE completed, consistent with nutrition diagnosis of malnutrition using AND/ASPEN criteria. See MSA below.      --  Edema  No edema documented      Bowel Function Last documented BM 2/18 (x 2 days ago)     Tubes NG tube      Chewing/Swallowing Unknown baseline, NPO at this time      Skin Left scalp area  Stage 2 to right coccyx  Stage 2 to right trochanter    No WOCN note at this time      --  Estimated/Assessed Needs    Estimated 2/13/24, remains current    Energy Requirements    EST Needs, Method, Wt used 3314-2733 kcal/day (30-40 kcal/kg of CBW 57.7 kg)        Protein Requirements    EST Needs, Method, Wt used  g/day (1.2-2.0 g/kg of CBW 57.7 kg)        Fluid Requirements     Estimated Needs (mL/day) 1 mL/kcal, will monitor hydration status       Fluid Deficit    Current Na Level (mEq/L) 156   Desired Na Level (mEq/L) 148-150   Estimated Fluid Deficit (L)  1.1-1.5 L     Current Nutrition Orders & Evaluation of Intake       Oral Nutrition     Food Allergies Gluten   Current PO Diet NPO Diet NPO Type: Strict NPO   Supplement None ordered   PO Evaluation     Trending % PO Intake 2/20: NPO  2/13: NPO   --  Enteral Nutrition    Enteral Route NG tube    Order, Modulars, Flushes TF order discontinued per RN 2/19   Residual/Tolerance    TF Observation         Parenteral Nutrition     TPN Route    Total # Days on TPN    TPN Order, Lipid Details    MVI & Trace Element Freq    TPN Observation         Nutrition Course Details    PO Diets: NPO since admission    Nutrition Support: TF from 2/14-2/19     Nutritional Risk Screening        NRS-2002 Score          Nutrition Diagnosis         Nutrition Dx  Problem 1 Increased nutrient needs related to increased needs for healing as evidenced by wounds.      Nutrition Dx Problem 2 Severe chronic disease related malnutrition related to chronic diseases including COPD as evidenced by severe fat/muscle loss per physical exam.         Intervention Goal         Intervention Goal(s) Tolerate EN  Improve skin integrity   No weight loss     Nutrition Intervention        RD Action Continue EN     Nutrition Prescription          Diet Prescription NPO   Supplement Prescription    --  Enteral Prescription End Goal:    Nutren 2.0 at 275 mL q 4 hours + 150mL/hr water flush before and after each bolus     Calories  2200 kcal    Protein  92 g   Free water  761 mL   Flushes  1200 mL     Water flushes adjusted based on clinical picture + Rx flushes/IV fluids          TPN Prescription      Monitor/Evaluation        Monitor Per protocol, I&O, Pertinent labs, Weight, Skin status, GI status, Symptoms, POC/GOC, Swallow function     Electronically signed by:  Janie Vo RD  02/20/24 08:22 EST

## 2024-02-20 NOTE — PROGRESS NOTES
"  Critical Care Progress Note      Jersey Celaya : 1955 MRN:[id number] LOS:4  Rm: 2316/1      Principal Problem: AMS (altered mental status)      Reason for follow up: All the medical problems listed below     Summary      Jersey Celaya is a 68 y.o. male with PMH of hypertension, COPD, CAD, anxiety, depression, cervical disc degeneration was brought to the hospital via EMS and was admitted with a principal diagnosis of AMS (altered mental status).  Information for HPI taken from chart review and discussion with patient's son Macario as patient is minimally responsive at time of assessment.  It is unclear who called EMS as there was no family/friends that came with the patient.  In the emergency department patient was responding only to painful stimuli.  He had no purposeful movement to his extremities.  CT head was obtained which showed \"Findings suspicious for a mass in the posterior left periventricular white matter with surrounding edema, and mass effect. There is effacement of the posterior left lateral ventricle, effacement of the left posterior cerebral sulci, and 2-3 mm rightward midline shift. Finding is indeterminate but concerning for neoplasm or abscess. MRI with and without contrast is recommended for further evaluation. 2.There appears to be slight peripheral hyperdensity of the mass, and a component of hemorrhage cannot be excluded. This does not appear typical of an acute intraparenchymal hemorrhage, and no other hemorrhage is seen at this time.  3.Opacification of the right mastoid air cells and middle ear spaces with absence of the ossicles, as before. There is also hyperdense attenuation in the judah of the right external auditory canal and posteriorly along the right mastoid. Findings could be  related to chronic otomastoiditis with possible postoperative changes on the right.  4.Paranasal sinus mucosal thickening with trace air-fluid levels which could indicate acute sinusitis\".  " "Per ER documentation, neurosurgery was consulted and recommended MRI in the morning, and treatment with IV Keppra loading dose and IV Decadron 10 mg every 6 hours.  Patient was in c-collar on arrival, after chart review this was applied during emergency room visit in January of this year (see below).     Speaking with patient's son Macario, he states that his father \"has been declining for over a year\".  States that he has had to bathe his father, help him eat, and dress him.  States that he has been trying to get him placed in an assisted living or establish home health.  On assessment patient's pupils are unequal, and he only responds to vigorous stimulation.  He is oxygenating, and an ABG did not show hypoxia or hypercapnia.  MRI was obtained which showed \"large mass centered in the left parietal white matter and extensively involving the corpus callosum crossing to the right parietal white matter with extensive surrounding signal abnormality concerning for infiltrating tumor. This is most consistent with a high-grade glioma. There is extensive intratumoral susceptibility artifact suggesting hemorrhagic component. There is significant mass effect with sulcal effacement on the left and partial effacement of the right lateral ventricle. There is some enlargement of the left temporal horn, which could suggest a trapped component. There is 4 mm rightward shift of midline structures due to mass effect. No herniation. No evidence of acute infarct or acute intracranial hemorrhage\".  Neurosurgery was contacted concerning these findings, and instructed to continue with current regimen of decadron.  Neurosurgery evaluating patient, recommending IV steroids and hypertonic IVFs and monitoring.  Hospice consulted upon family request.  Will hold on hypertonic IVF until decision made whether patient will discharge home with hospice or continue treatment.     Significant Events      02/17/24 : Mentation waxing and waning, restless " and agitated at times. Tolerating room air.  3% saline discontinued per QI.  CT C/A/P did not reveal any obvious metastatic disease.  MRI brain has not been done due to pt agitation and movement.  Stable for transfer out of the ICU.     Of note, patient was deemed ineligible for [facility] hosp[facility], would have to be discharged home with hospice.  Hospice discussed with patient's family.  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 with mass effect  2/20  2 continue to be confused  Seen per nephrology  Sodium is up to 156  Patient with glioma followed up with neurosurgery  His prognosis is poor  Tube feeding was held as patient would not sit still with 30 degrees open may need bolus feeding consider continuous feeding  Patient has been seen per cardiology for sinus tachycardia currently on beta-blocker with no need for further evaluation  Continue to be encephalopathic  Neurosurgery is not planning on any intervention  Continue Decadron and Keppra on hypertonic saline  Family requested palliative care  Will get hospice consult for further evaluation  Assessment / Plan      Acute encephalopathy  Large left parietal mass in white matter  S/p fall at home, recurrent falls per documentation  -Remains confused/agitated  -CT head 2/12 and MRI brain 2/13 with large mass in the left parietal  noted.   -Repeat MRI brain, has not been done due to patient agitation, movement would produce low quality diagnostic imaging  -CT C/A/P 2/16 with no obvious metastatic disease  -Neurosurgery following.  Await results of MRI brain for further treatment/surgical  recommendations  -IV Decadron every 6 hours  -IV Keppra for seizure prophylaxis  -Hypertonic saline discontinued, goal Na of 145-150 has been stable.  -Heme/Onc following for treatment recommendations   -Palliative care consulted as requested, doesn't meet GIP criteria, but may discharge home with hospice.     Rhabdomyolysis  -Likely traumatic, patient with multiple falls per documentation  -CK 4195, repeat CK  2755  -Patient does not meet SIRS criteria, do not suspect infection at this time  -Continue IVFs.     Steroid induced hyperglycemia  Add SSI q6H     Chronic:  Hypertension: home antihypertensive of Toprol XL.  Due to marginal hypotension restarted BB at low-dose   COPD: Not in exacerbation, oxygen supplementation as needed. Tolerating room air  CAD: Continue home aspirin once able clinically appropriate  Anxiety/depression: Resume home citalopram, hold remeron for now  Vitamin D deficiency: restart home cyanocobalamin once clinically appropriate  Peripheral neuropathy/RLS: Continue gabapentin, zanaflex when appropriate. Currently held for neuro exams and wakefulness  Enlarged prostate: Continue Flomax when able to take PO medications.     Disposition:  NABIL.  Possible discharge home with hospice if treatment showing no improvement.  Hospice following.     Code status:   Medical Intervention Limits: NO intubation (DNI)  Code Status (Patient has no pulse and is not breathing): No CPR (Do Not Attempt to Resuscitate)  Medical Interventions (Patient has pulse or is breathing): Limited Support        Nutrition:   NPO Diet NPO Type: Strict NPO   Diet, Tube Feeding Tube Feeding Formula: Nutren 2.0 (TwoCal); Tube Feeding Type: Continuous; Continuous Tube Feeding Start Rate (mL/hr): 30; Then Advance Rate By (mL/hr): 10; Every __ Hours: 6; To Goal Rate of (mL/hr): 50      DVT prophylaxis:  Mechanical DVT prophylaxis orders are present.           Subjective / Review of systems      Review of Systems   Unable to perform  ROS: Mental status change   Nonverbal      Objective / Physical Exam      Vital signs:  Temp: 98.5 °F (36.9 °C)  BP: 126/70  Heart Rate: (!) 121  Resp: 24  SpO2: 95 %  Weight: 56.6 kg (124 lb 12.5 oz)     Admission Weight: Weight: 70.3 kg (155 lb)  Current Weight: Weight: 56.6 kg (124 lb 12.5 oz)     Input/Output in last 24 hours:     Intake/Output Summary (Last 24 hours) at 2/17/2024 0816  Last data filed at 2/17/2024 0345      Gross per 24 hour   Intake 1817 ml   Output --   Net 1817 ml      Net IO Since Admission: 3,835 mL [02/17/24 0816]      Physical Exam  Vitals and nursing note reviewed.   Constitutional:       Appearance: He is ill-appearing. He is not diaphoretic.      Comments: Lethargic at rest, agitated with stimulation.  Appears older than stated age. Cachectic and emaciated    HENT:      Head: Normocephalic and atraumatic.      Comments: Bitemporal wasting. Periorbital tissue wasting     Nose: Nose normal.      Mouth/Throat:      Mouth: Mucous membranes are dry.   Eyes:      General: No scleral icterus.     Conjunctiva/sclera: Conjunctivae normal.      Pupils: Pupils are equal, round, and reactive to light.      Comments: Pupils dilated but equal and briskly responsive   Cardiovascular:      Heart sounds: Normal heart sounds. No murmur heard.     Comments: SR-ST  Pulmonary:      Effort: Pulmonary effort is normal. No respiratory distress.      Breath sounds: Normal breath sounds. No wheezing, rhonchi or rales.      Comments: Bases diminished  Abdominal:      General: There is no distension.      Tenderness: There is no guarding.      Comments: Scaphoid   Hypoactive bowel sounds   Musculoskeletal:         General: No deformity.      Cervical back: Neck supple. No rigidity.      Right lower leg: No edema.      Left lower leg: No edema.   Skin:     General: Skin is warm and dry.      Coloration: Skin is pale.   Neurological:      Comments: Nonverbal to conversation, unable to assess orientation.  Lethargic, restless at times. Moves all extremities spontaneously. Not following commands.  W/d from noxious stimuli   Psychiatric:      Comments: Anxious/restless.            Radiology and Labs               Results from last 7 days   Lab Units 02/17/24  0601 02/16/24  0354 02/15/24  0441 02/14/24  0551 02/13/24  0433   WBC 10*3/mm3 12.20* 8.00 7.80 13.40* 9.90   HEMOGLOBIN g/dL 11.0* 10.6* 9.7* 9.8* 10.5*   HEMATOCRIT % 34.8* 33.4* 29.5* 30.0* 31.9*   PLATELETS 10*3/mm3 189 196 207 199 182                             Results from last 7 days   Lab Units 02/17/24  0750 02/17/24  0601 02/17/24  0003 02/16/24  2048 02/16/24  1212 02/16/24  0810 02/16/24  0354 02/16/24  0101 02/15/24  0827 02/15/24  0441 02/14/24  0801 02/14/24  0551 02/13/24  1014 02/13/24  0433   SODIUM mmol/L 145 146* 146*  --  145 148* 147* 144   < > 141   < > 136   < > 132*   POTASSIUM mmol/L  --  4.2  --  4.5 3.6 3.8 4.3 3.9   < > 4.0   < > 4.5   < > 4.3   CHLORIDE mmol/L  --  112*  --   --  110* 112* 112* 110*   < > 107   < > 101   < > 95*   CO2 mmol/L  --  28.0  --   --  27.0 25.0 24.0 25.0   < > 25.0   < > 23.0   < > 20.0*   BUN mg/dL  --  36*  --   --  28* 31* 31* 31*   < > 29*   < > 26*   < > 29*   CREATININE mg/dL  --  0.73*  --   --  0.57* 0.63* 0.63* 0.61*   < > 0.67*   < > 0.64*   < > 0.72*   GLUCOSE mg/dL  --  152*  --   --  150* 161* 142* 184*   < > 151*   < > 127*   < > 121*   MAGNESIUM mg/dL  --  2.1  --   --   --   --  2.0  --   --  2.0  --  2.0  --  1.9   PHOSPHORUS mg/dL  --  2.1*  --   --   --  3.0 2.1*  --   --  2.6  --  2.9  --  4.2    < > = values in this interval not displayed.               Results from last 7 days   Lab Units 02/17/24  0601 02/16/24  0354 02/15/24  0441 02/14/24  0551 02/13/24  0433   ALK PHOS U/L 121* 143* 148* 168* 199*   AST (SGOT) U/L 50* 89* 136* 145* 161*   ALT (SGPT) U/L 78* 96* 98* 74* 62*           Results from last 7 days   Lab Units 02/12/24 2001   PH, ARTERIAL pH units 7.478*   PCO2, ARTERIAL  mm Hg 23.1*   PO2 ART mm Hg 132.2*   O2 SATURATION ART % 99.3*   FIO2 % 28   HCO3 ART mmol/L 17.1*   BASE EXCESS ART mmol/L -4.9*         CT Chest With Contrast Diagnostic     Result Date: 2/16/2024  Impression: 1.No definitive metastatic disease in the chest, abdomen, or pelvis identified. 2.There is 7 mm nonspecific right hepatic lobe hypodensity, likely a cyst although too small to characterize. 3.Subacute to more chronic right-sided rib fractures. Correlate with patient history. Electronically Signed: Louis Richards MD  2/16/2024 11:27 AM EST  Workstation ID: [id number]     CT Abdomen Pelvis With Contrast     Result Date: 2/16/2024  Impression: 1.No definitive metastatic disease in the chest, abdomen, or pelvis identified. 2.There is 7 mm nonspecific right hepatic lobe hypodensity, likely a cyst although too small to characterize. 3.Subacute to more chronic right-sided rib fractures. Correlate with patient history. Electronically Signed: Louis Richards MD  2/16/2024 11:27 AM EST  Workstation ID: [id number]         Current medications      Scheduled Meds:   citalopram, 20 mg, Nasogastric, Nightly  dexAMETHasone, 4 mg, Intravenous, Q6H  insulin lispro, 2-9 Units, Subcutaneous, Q6H  levETIRAcetam, 500 mg, Intravenous, Q12H  metoprolol tartrate, 12.5 mg, Nasogastric, Q12H  midazolam, 2 mg, Intravenous, Once  sodium chloride, 10 mL, Intravenous, Q12H  sodium phosphate, 15 mmol, Intravenous, Once           Continuous Infusions:            Plan discussed with RN. Reviewed all other data in the last 24 hours, including but not limited to vitals, labs, microbiology, imaging and pertinent notes from other providers.

## 2024-02-20 NOTE — DISCHARGE SUMMARY
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 not candidate for any surgery  Pt with hx of htn and cad  Pt was made dnr ,comfort measures and was transferred to [facility] inpatient hospice status  Hx of copd    Date of Discharge:    Primary Care Physician: Shailesh Thapa MD      Presenting Problem:   Fall, initial encounter [W19.XXXA]  Traumatic rhabdomyolysis, initial encounter [T79.6XXA]  Altered mental status, unspecified altered mental status type [R41.82]  AMS (altered mental status) [R41.82]    Active and Resolved Hospital Problems:  Active Hospital Problems    Diagnosis POA    **AMS (altered mental status) [R41.82] Yes    Severe malnutrition [E43] Yes      Resolved Hospital Problems   No resolved problems to display.         Hospital Course     Hospital Course:  Jersey Celaya is a 68 y.o. male wiliam Celaya is a 68 y.o. male with PMH of hypertension, COPD, CAD, anxiety, depression, cervical disc degeneration was brought to the hospital via EMS and was admitted with a principal diagnosis of AMS (altered mental status).  Information for HPI taken from chart review and discussion with patient's son Macario as patient is minimally responsive at time of assessment.  It is unclear who called EMS as there was no family/friends that came with the patient.  In the emergency department patient was responding only to painful stimuli.  He had no purposeful movement to his extremities.  CT head was obtained which showed \"Findings suspicious for a mass in the posterior left periventricular white matter with surrounding edema, and mass effect. There is effacement of the posterior left lateral ventricle, effacement of the left posterior cerebral sulci, and 2-3 mm rightward midline " "shift. Finding is indeterminate but concerning for neoplasm or abscess. MRI with and without contrast is recommended for further evaluation. 2.There appears to be slight peripheral hyperdensity of the mass, and a component of hemorrhage cannot be excluded. This does not appear typical of an acute intraparenchymal hemorrhage, and no other hemorrhage is seen at this time.  3.Opacification of the right mastoid air cells and middle ear spaces with absence of the ossicles, as before. There is also hyperdense attenuation in the judah of the right external auditory canal and posteriorly along the right mastoid. Findings could be  related to chronic otomastoiditis with possible postoperative changes on the right.  4.Paranasal sinus mucosal thickening with trace air-fluid levels which could indicate acute sinusitis\".  Per ER documentation, neurosurgery was consulted and recommended MRI in the morning, and treatment with IV Keppra loading dose and IV Decadron 10 mg every 6 hours.  Patient was in c-collar on arrival, after chart review this was applied during emergency room visit in January of this year (see below)         DISCHARGE Follow Up Recommendations for labs and diagnostics: with hospice      Reasons For Change In Medications and Indications for New Medications:      Day of Discharge     Vital Signs:  Temp:  [96.6 °F (35.9 °C)-98.5 °F (36.9 °C)] 97.4 °F (36.3 °C)  Heart Rate:  [] 81  Resp:  [18-20] 18  BP: ()/(55-79) 98/64    Physical Exam:  Physical Exam   Confised non communicative agitated  Chest decreased bs  Cvs rrr  Abd soft nt      Pertinent  and/or Most Recent Results     LAB RESULTS:      Lab 02/20/24  0418 02/19/24  1031 02/18/24  0556 02/17/24  0601 02/16/24  0354 02/15/24  0441   WBC 12.90* 14.00* 16.60* 12.20* 8.00 7.80   HEMOGLOBIN 11.0* 12.2* 11.8* 11.0* 10.6* 9.7*   HEMATOCRIT 35.6* 38.9 37.4* 34.8* 33.4* 29.5*   PLATELETS 62* 95* 127* 189 196 207   NEUTROS ABS 10.45* 11.50* 15.20* 10.90* " 6.70 6.70   LYMPHS ABS  --  0.60* 0.30* 0.20* 0.20* 0.20*   MONOS ABS  --  1.90* 1.10* 1.00* 1.00* 0.90   EOS ABS  --  0.00 0.00 0.00 0.00 0.00   .8* 103.0* 102.7* 101.4* 102.3* 102.1*         Lab 02/20/24  1210 02/20/24  0418 02/19/24  2135 02/19/24  1637 02/19/24  0640 02/18/24  2121 02/18/24  0556 02/17/24  2020 02/17/24  1441 02/17/24  0750 02/17/24  0601 02/16/24  0810 02/16/24  0354   SODIUM 152*  153* 156* 157* 156* 154*   < > 149*  149*   < >  --    < > 146*   < > 147*   POTASSIUM 4.8 4.8  --  3.6 4.4  --  4.3  --   --   --  4.2   < > 4.3   CHLORIDE 118* 120*  --  125* 117*  --  114*  --   --   --  112*   < > 112*   CO2 26.0 27.0  --  23.0 27.0  --  28.0  --   --   --  28.0   < > 24.0   ANION GAP 8.0 9.0  --  8.0 10.0  --  7.0  --   --   --  6.0   < > 11.0   BUN 53* 60*  --  61* 70*  --  47*  --   --   --  36*   < > 31*   CREATININE 0.80 0.92  --  0.87 1.09  --  0.72*  --   --   --  0.73*   < > 0.63*   EGFR 97.0 91.2  --  94.6 74.4  --  100.1  --   --   --  99.7   < > 104.3   GLUCOSE 156* 157*  --  129* 170*  --  150*  --   --   --  152*   < > 142*   CALCIUM 8.2* 8.0*  --  6.5* 8.4*  --  8.4*  --   --   --  8.6   < > 8.5*   MAGNESIUM  --  2.4  --   --  2.5*  --  2.3  --   --   --  2.1  --  2.0   PHOSPHORUS  --  4.6*  --   --  4.3  --  3.4  --  3.6  --  2.1*   < > 2.1*    < > = values in this interval not displayed.         Lab 02/20/24  0418 02/19/24  0640 02/18/24  0556 02/17/24  0601 02/16/24  0354   TOTAL PROTEIN 4.8* 5.0* 5.1* 5.5* 5.6*   ALBUMIN 3.2* 3.3* 3.5 3.7 3.4*   GLOBULIN 1.6 1.7 1.6 1.8 2.2   ALT (SGPT) 44* 55* 70* 78* 96*   AST (SGOT) 21 27 40 50* 89*   BILIRUBIN 0.5 0.4 0.4 0.4 0.5   ALK PHOS 97 96 103 121* 143*                     Brief Urine Lab Results  (Last result in the past 365 days)        Color   Clarity   Blood   Leuk Est   Nitrite   Protein   CREAT   Urine HCG        02/12/24 1931 Yellow   Clear   Large (3+)   Negative   Negative   30 mg/dL (1+)                 Microbiology  Results (last 10 days)       Procedure Component Value - Date/Time    MRSA Screen, PCR (Inpatient) - Swab, Nares [[id number]]  (Normal) Collected: 02/13/24 0419    Lab Status: Final result Specimen: Swab from Nares Updated: 02/13/24 0552     MRSA PCR No MRSA Detected    Narrative:      The negative predictive value of this diagnostic test is high and should only be used to consider de-escalating anti-MRSA therapy. A positive result may indicate colonization with MRSA and must be correlated clinically.            XR Abdomen KUB    Result Date: 2/19/2024  Impression: Impression: Enteric tube within the stomach. Electronically Signed: Venita Mcwilliams MD  2/19/2024 2:48 AM EST  Workstation ID: [id number]    MRI Brain Without Contrast    Result Date: 2/17/2024  Impression: Extremely limited examination for assessment of treatment response. There is significant patient motion in most of the imaging sequences, and IV contrast was not administered as the patient was unable to sign contrast consent. Complex mass lesion is seen centered about the left parieto-occipital region crossing the midline along the splenium of the corpus callosum. Extensive surrounding vasogenic edema, most pronounced in the left parieto-occipital region. The dominant left-sided component measuring 5.9 x 2.7 cm and the smaller right-sided component measuring approximately 2.8 x 1.5 cm. This appears similar or slightly smaller in comparison with 2/13/2024. Of note, the SWI signal hypointensity within the lesion has decreased and is now relatively confined to the periphery of the lesion. 3 to 4 mm left right midline shift is again suggested. No findings to suggest acute infarct. Consider repeat examination, possibly utilizing patient sedation. Please see above for additional details. Electronically Signed: Toni De La Fuente MD  2/17/2024 5:33 PM EST  Workstation ID: [id number]    CT Chest With Contrast Diagnostic    Result Date: 2/16/2024  Impression: Impression:  1.No definitive metastatic disease in the chest, abdomen, or pelvis identified. 2.There is 7 mm nonspecific right hepatic lobe hypodensity, likely a cyst although too small to characterize. 3.Subacute to more chronic right-sided rib fractures. Correlate with patient history. Electronically Signed: Louis Richards MD  2/16/2024 11:27 AM EST  Workstation ID: [id number]    CT Abdomen Pelvis With Contrast    Result Date: 2/16/2024  Impression: Impression: 1.No definitive metastatic disease in the chest, abdomen, or pelvis identified. 2.There is 7 mm nonspecific right hepatic lobe hypodensity, likely a cyst although too small to characterize. 3.Subacute to more chronic right-sided rib fractures. Correlate with patient history. Electronically Signed: Louis Richards MD  2/16/2024 11:27 AM EST  Workstation ID: [id number]    XR Abdomen KUB    Result Date: 2/14/2024  Impression: Impression: Enteric tube tip in the antrum of the stomach. Electronically Signed: Louis Richards MD  2/14/2024 3:03 PM EST  Workstation ID: [id number]    XR Abdomen KUB    Result Date: 2/14/2024  Impression: Impression: Enteric tube tip in the proximal stomach. Electronically Signed: Louis Richards MD  2/14/2024 10:55 AM EST  Workstation ID: [id number]    XR Chest 1 View    Result Date: 2/14/2024  Impression: Impression: PICC line tip is at the brachiocephalic confluence. Electronically Signed: Chelly Crabtree MD  2/14/2024 9:58 AM EST  Workstation ID: [id number]    MRI Brain With & Without Contrast    Result Date: 2/13/2024  Impression: Impression: 1.Large mass centered in the left parietal white matter and extensively involving the corpus callosum crossing to the right parietal white matter with extensive surrounding signal abnormality concerning for infiltrating tumor. This is most consistent with a high-grade glioma. 2.There is extensive intratumoral susceptibility artifact suggesting hemorrhagic component. 3.There is significant mass effect with sulcal  effacement on the left and partial effacement of the right lateral ventricle. There is some enlargement of the left temporal horn, which could suggest a trapped component. 4.There is 4 mm rightward shift of midline structures due to mass effect. No herniation. 5.No evidence of acute infarct or acute intracranial hemorrhage. Electronically Signed: Dieter Quinones MD  2/13/2024 1:30 AM EST  Workstation ID: [id number]    CT Head Without Contrast    Result Date: 2/12/2024  Impression: Impression: 1.Findings suspicious for a mass in the posterior left periventricular white matter with surrounding edema, and mass effect. There is effacement of the posterior left lateral ventricle, effacement of the left posterior cerebral sulci, and 2-3 mm rightward midline shift. Finding is indeterminate but concerning for neoplasm or abscess. MRI with and without contrast is recommended for further evaluation. 2.There appears to be slight peripheral hyperdensity of the mass, and a component of hemorrhage cannot be excluded. This does not appear typical of an acute intraparenchymal hemorrhage, and no other hemorrhage is seen at this time. 3.Opacification of the right mastoid air cells and middle ear spaces with absence of the ossicles, as before. There is also hyperdense attenuation in the area of the right external auditory canal and posteriorly along the right mastoid. Findings could be  related to chronic otomastoiditis with possible postoperative changes on the right. 4.Paranasal sinus mucosal thickening with trace air-fluid levels which could indicate acute sinusitis. Results were called to the ordering provider by Dr. Louie at 2/12/2024 7:46 PM EST. Electronically Signed: Quincy Louie  2/12/2024 7:57 PM EST  Workstation ID: [id number]    CT Cervical Spine Without Contrast    Result Date: 2/12/2024  Impression: Impression: No evidence of acute fracture or traumatic subluxation of the cervical spine. Postsurgical changes from C3-C6. Stable  partially calcified right thyroid lobe nodule. Correlate with ultrasound if not already performed. Electronically Signed: Emil Norwood MD  2/12/2024 7:42 PM EST  Workstation ID: [id number]    XR Chest 1 View    Result Date: 2/12/2024  Impression: 1.No radiographic findings of acute cardiopulmonary abnormality. 2.Chronic fractures of the lateral right seventh and eighth ribs. Electronically Signed: Quincy Liban  2/12/2024 7:29 PM EST  Workstation ID: [id number]    XR Chest 1 View    Result Date: 2/5/2024  Impression: Impression: Mild vascular congestion.. Electronically Signed: Macario Mcclain MD  2/5/2024 4:18 PM EST  Workstation ID: [id number]             Results for orders placed during the hospital encounter of 01/17/24    Adult Transthoracic Echo Complete W/ Cont if Necessary Per Protocol    Interpretation Summary    Left ventricular ejection fraction appears to be 56 - 60%.      Labs Pending at Discharge:      Procedures Performed           Consults:   Consults       Date and Time Order Name Status Description    2/19/2024  9:29 AM Inpatient Nephrology Consult      2/18/2024  7:14 PM Inpatient Cardiology Consult Completed     2/17/2024  8:12 AM Inpatient Hospitalist Consult      2/14/2024  8:28 AM Hematology & Oncology Inpatient Consult Completed     2/12/2024  9:02 PM Intensivist (on-call MD unless specified)      2/12/2024  8:08 PM Neurosurgery (on-call MD unless specified) Completed     1/17/2024  1:31 PM Neurosurgery (on-call MD unless specified) Completed               Discharge Details        Discharge Medications        ASK your doctor about these medications        Instructions Start Date   albuterol sulfate  (90 Base) MCG/ACT inhaler  Commonly known as: PROVENTIL HFA;VENTOLIN HFA;PROAIR HFA   2 puffs, Inhalation, Every 4 Hours PRN      aspirin 81 MG EC tablet   81 mg, Oral, Daily      atorvastatin 10 MG tablet  Commonly known as: LIPITOR   10 mg, Oral, Nightly      citalopram 40 MG tablet  Commonly  known as: CeleXA   40 mg, Oral, Nightly      diphenoxylate-atropine 2.5-0.025 MG per tablet  Commonly known as: LOMOTIL   1 tablet, Oral, 2 Times Daily PRN      ferrous sulfate 324 MG tablet delayed-release   324 mg, Oral, Daily With Breakfast      fluticasone 50 MCG/ACT nasal spray  Commonly known as: FLONASE   1 spray, Nasal, Daily      gabapentin 300 MG capsule  Commonly known as: NEURONTIN   300 mg, Oral, 3 Times Daily      meloxicam 15 MG tablet  Commonly known as: MOBIC   15 mg, Oral, Daily PRN      metoprolol succinate XL 25 MG 24 hr tablet  Commonly known as: TOPROL-XL   25 mg, Oral, Daily      mirtazapine 15 MG tablet  Commonly known as: REMERON   15 mg, Oral, Nightly      tamsulosin 0.4 MG capsule 24 hr capsule  Commonly known as: FLOMAX   1 capsule, Oral, Nightly      temazepam 15 MG capsule  Commonly known as: RESTORIL   15 mg, Oral, Nightly PRN      tiZANidine 4 MG tablet  Commonly known as: ZANAFLEX   4 mg, Oral, Nightly      vitamin B-12 1000 MCG tablet  Commonly known as: CYANOCOBALAMIN   1,000 mcg, Oral, Daily               Allergies   Allergen Reactions    Pregabalin Swelling    Gluten Meal GI Intolerance         Discharge Disposition: ip hospice  Hospice/Medical Facility ([facility])    Diet:  Hospital:  Diet Order   Procedures    NPO Diet NPO Type: Strict NPO         Discharge Activity:         CODE STATUS:  Code Status and Medical Interventions:   Ordered at: 02/16/24 1032     Medical Intervention Limits:    NO intubation (DNI)     Code Status (Patient has no pulse and is not breathing):    No CPR (Do Not Attempt to Resuscitate)     Medical Interventions (Patient has pulse or is breathing):    Limited Support         Future Appointments   Date Time Provider Department Center   2/29/2024  3:15 PM [facility] CT 2  [facility]   7/24/2024  2:00 PM Mack French MD [facility]           Time spent on Discharge including face to face service:  35 minutes        Signature:  Electronically signed by Stephen Carney MD, 02/20/24, 16:07 EST.  [facility] [place] Hospitalist Team

## 2024-02-20 NOTE — PLAN OF CARE
Goal Outcome Evaluation:      Pt transitioned to comfort care with [facility]. Pt restraints removed.

## 2024-02-20 NOTE — CASE MANAGEMENT/SOCIAL WORK
Social Work Assessment  [facility]     Patient Name: Jersey Celaya  MRN: [id number]  Today's Date: 2/20/2024    Admit Date: 2/12/2024       Discharge Plan       Row Name 02/20/24 1407       Plan    Plan Comments Consulted by  supervisor regarding case. Unable to reach NOK. See care coordinations documentation. LSW attempted to call patient’s son (Macario Celaya, [phone]) regarding connecting him to treatment team and discharge planning members. Voicemail left 2/20 @ 2:00pm. Attempted to call listed friend (Nahum Torrez, [phone]) to see if he could assist with connecting us to patient’s son, voicemail left 2/20 @ 2:02pm. Text sent to patient’s son’s number.        CECI Osorio, MSSW, [facility]    Phone: [phone]  Cell: [phone]  Fax: [phone]  [email or web address]

## 2024-02-20 NOTE — PLAN OF CARE
Goal Outcome Evaluation:      Pt disoriented x4. Restraints continued. TF held all shift (see previous note). 1/2 NS gtt infusing. Pt restless continuously throughout shift. Possible plan to transition to comfort care today; awaiting decision from pt's son. Vital signs stable, continuing to monitor.

## 2024-02-20 NOTE — NURSING NOTE
TF held since start of shift due to frequent shifting down in the bed and being an aspiration risk. RN given order from Francheska SRIVASTAVA to discontinue the TF order at this time. Suggested that RD revaluate and possibly change to bolus feeds. Continuing to monitor.

## 2024-02-20 NOTE — CASE MANAGEMENT/SOCIAL WORK
Continued Stay Note   [facility]     Patient Name: Jersey Celaya  MRN: [id number]  Today's Date: 2/20/2024    Admit Date: 2/12/2024    Plan: Patient lives alone, son NOK.  Possible GIP vs [facility] inpt placement. Precert required if SNF. PASRR approved.   Discharge Plan       Row Name 02/20/24 1342       Plan    Plan Patient lives alone, son NOK.  Possible GIP vs [facility] inpt placement. Precert required if SNF. PASRR approved.    Plan Comments [facility] at pt bedside to discuss current DC plan.  CM, LSW & floor RN attempted to contact cyn - Raul.  Cm consulted with BERNARD Chu to escalate DC plan - possible GIP vs [facility] inpt [facility].             Expected Discharge Date and Time       Expected Discharge Date Expected Discharge Time    Feb 20, 2024               MARA Bocanegra RN  SIPS/ICU   O: [phone]  C: [phone]  [email or web address]

## 2024-02-20 NOTE — CASE MANAGEMENT/SOCIAL WORK
Social Work Assessment  [facility]     Patient Name: Jersey Celaya  MRN: [id number]  Today's Date: 2/20/2024    Admit Date: 2/12/2024     Discharge Plan       Row Name 02/20/24 8654       Plan    Plan GIP v. hospice [facility]. Patient lives alone. Precert required if SNF. PASRR approved.      Plan Comments RASHAD was consulted re: family meeting to make decisions for hospice/discharge. RASHAD met with pt, his female friend, nurse, RNBERNARD, and liaisons Leilani and Taty with [facility]. Macario shared that he located a small home-like facility called [facility] that [facility] works with where pt could potentially d/c, pending his tour at the facility. He reports this will occur tomorrow after he leaves work at . Macario shared pt has no spouse and has 3 children, but the other 2 are not as involved. Macario provided contact info for each of them. RASHAD LVM for pt’s son Manuelito @7181 - no return call as of the time of this note. RASHAD spoke with pt’s dr Juan @1309, in which she gave verbal consent for Macario to be the decision-maker and she would support him. According to HB 1119, pt’s children are his legal NOK (majority rules), and since Macario has the support of his sister, Macario is able to decide pt’s healthcare plans. At the conclusion of the meeting, the [facility] nurse arrived to complete assessment for GIP. Of note, pt’s son Macario reports the best way to contact him is via phone call, but also provided email address: [email or web address].         Demographic Summary       Row Name 02/20/24 1428       General Information    Reason for Consult discharge planning    General Information Comments RASHAD received VM from pt's son Macario Celaya @0550 today. RASHAD returned call @7750, but no answer - LVM with return contact info. No response as of the time of this note. Per RNCM, staff was able to connect with pt's son, who agreed to meet with hospice company and care coordination at the hospital today at 3p.              JAVED Stone, [facility]  Medical Social Worker  Ph [phone]  Fax [phone]  [email or web address]

## 2024-02-20 NOTE — CASE MANAGEMENT/SOCIAL WORK
Continued Stay Note  [facility]     Patient Name: Jersey Celaya  MRN: [id number]  Today's Date: 2/20/2024    Admit Date: 2/12/2024                 Plan Comments CM recieved phone call from patient's son, Macario Celaya at 1410. CM explained to son about patient possibly qualifing for GIP. CM informed son in order to do this, he would need to come to the hospital to meet with liakimberly Posey with [facility]. Son agreeable and stated he can be at the hospital at 1515. CM asked son if he has thought anymore about placement with hospice, if patient does not qualify for GIP. Son stated he has been in contact with [facility] Leilani SOLORZANO. He stated they have found a facility that would cost $2,700/month, son could not remember the name of the facility at this time. Son stated his father does have enough in his bank account to afford $2,700/month. Son is in the process of becoming Durable POA, once able, he would then be able to retrieve funds for payment. CM unsure if son would be willing to pay $2,700/month himself, and then recieve payment once able to get into patient's bank account. CM called manpreet Posey with [facility], she stated she will meet with son in the patient's room at 1515. CM called RNBERNARD and SW assigned to the case, they stated they will meet in patient's room at this time. CM will follow up once [facility] completes evaluation.             Kimberley Sprague RN, BSN    [address] [address]  Phone: [phone]  Fax: [phone]

## 2024-02-20 NOTE — DISCHARGE PLACEMENT REQUEST
"Please contact Kimberley Sprague at [phone] if able to accept.    Kimberley Sprague, RN, BSN    [address] [place] [address]  Phone: [phone]  Fax: [phone]      AddisonDoni cortés (68 y.o. Male)       Date of Birth   1955    Social Security Number       Address   [address] [place] [address] [place] [address]    Home Phone   [phone]    MRN   [id number]       Congregation   [employer]    Marital Status   Single                            Admission Date   2/12/24    Admission Type   Urgent    Admitting Provider   Pedro Rashid DO    Attending Provider   Stephen Carney MD    Department, Room/Bed   [facility] INTENSIVE CARE UNIT, 2316/1       Discharge Date       Discharge Disposition       Discharge Destination                                 Attending Provider: Stephen Carney MD    Allergies: Pregabalin, Gluten Meal    Isolation: None   Infection: None   Code Status: No CPR    Ht: 180.3 cm (70.98\")   Wt: 54.8 kg (120 lb 13 oz)    Admission Cmt: None   Principal Problem: AMS (altered mental status) [R41.82]                   Active Insurance as of 2/12/2024       Primary Coverage       Payor Plan Insurance Group Employer/Plan Group    ANTHEM MEDICARE REPLACEMENT ANTHEM MEDICARE ADVANTAGE [id number]       Payor Plan Address Payor Plan Phone Number Payor Plan Fax Number Effective Dates    [address] [phone]  1/1/2022 - None Entered    [facility] [id number]         Subscriber Name Subscriber Birth Date Member ID       DONI JAIME 1955 [id number]                     Emergency Contacts        (Rel.) Home Phone Work Phone Mobile Phone    ADDISONMARI (Son) -- -- [phone]    Nahum Torrez (Friend) -- -- [phone]                 History & Physical        Francheska Anders, ATIF at 02/12/24 3062       Attestation signed by Pedro Rashid DO at 02/13/24 1122    I have reviewed this " documentation and agree.                    Critical Care History and Physical     Jersey Celaya : 1955 MRN:[id number] LOS:0 ROOM:      Reason for admission: AMS (altered mental status)     Assessment / Plan     Altered Mental Status  S/p fall at home, recurrent falls per documentation  -Patient brought in via EMS, however uncertain of who called  -Patient minimally responsive to stimuli  -Oxygenation status normal  -CT head and MRI head noted as below  -Neurosurgery consulted in ED, will see in a.m.  -IV Decadron every 6 hours  -IV Keppra for seizure prophylaxis    Rhabdomyolysis  -Patient with multiple falls per documentation  -CK 4195  -Patient does not meet SIRS criteria, do not suspect infection at this time  -Recently seen by Dr. French for syncope work-up  -Echo obtained 2024 for syncope with EF 56-60%  -Will start IVFs    Chronic:  Hypertension: Patient currently normotensive, resume home antihypertensives once clinically appropriate   COPD: Not in exacerbation, continue with oxygen supplementation as needed  CAD: Continue home aspirin once able clinically appropriate  Anxiety/depression: Resume home citalopram once clinically appropriate  Vitamin D deficiency: Resume home cyanocobalamin once clinically appropriate    Level Of Support Discussed With: Next of Kin (If No Surrogate)  Code Status (Patient has no pulse and is not breathing): No CPR (Do Not Attempt to Resuscitate)  Medical Interventions (Patient has pulse or is breathing): Full Support       Nutrition:   NPO Diet NPO Type: Strict NPO     DVT prophylaxis:  Mechanical DVT prophylaxis orders are present.      History of Present illness     Jersey Celaya is a 68 y.o. male with PMH of hypertension, COPD, CAD, anxiety, depression, cervical disc degeneration was brought to the hospital via EMS and was admitted with a principal diagnosis of AMS (altered mental status).  Information for HPI taken from chart review and discussion  "with patient's son Macario as patient is minimally responsive at time of assessment.  It is unclear who called EMS as there was no family/friends that came with the patient.  In the emergency department patient was responding only to painful stimuli.  He had no purposeful movement to his extremities.  CT head was obtained which showed \"Findings suspicious for a mass in the posterior left periventricular white matter with surrounding edema, and mass effect. There is effacement of the posterior left lateral ventricle, effacement of the left posterior cerebral sulci, and 2-3 mm rightward midline shift. Finding is indeterminate but concerning for neoplasm or abscess. MRI with and without contrast is recommended for further evaluation. 2.There appears to be slight peripheral hyperdensity of the mass, and a component of hemorrhage cannot be excluded. This does not appear typical of an acute intraparenchymal hemorrhage, and no other hemorrhage is seen at this time.  3.Opacification of the right mastoid air cells and middle ear spaces with absence of the ossicles, as before. There is also hyperdense attenuation in the judah of the right external auditory canal and posteriorly along the right mastoid. Findings could be  related to chronic otomastoiditis with possible postoperative changes on the right.  4.Paranasal sinus mucosal thickening with trace air-fluid levels which could indicate acute sinusitis\".  Per ER documentation, neurosurgery was consulted and recommended MRI in the morning, and treatment with IV Keppra loading dose and IV Decadron 10 mg every 6 hours.  Patient was in c-collar on arrival, after chart review this was applied during emergency room visit in January of this year (see below).    Speaking with patient's son Macario, he states that his father \"has been declining for over a year\".  States that he has had to bathe his father, help him eat, and dress him.  States that he has been trying to get him placed in " "an assisted living or establish home health.  On assessment patient's pupils are unequal, and he only responds to vigorous stimulation.  He is oxygenating, and an ABG did not show hypoxia or hypercapnia.  MRI was obtained which showed \"large mass centered in the left parietal white matter and extensively involving the corpus callosum crossing to the right parietal white matter with extensive surrounding signal abnormality concerning for infiltrating tumor. This is most consistent with a high-grade glioma. There is extensive intratumoral susceptibility artifact suggesting hemorrhagic component. There is significant mass effect with sulcal effacement on the left and partial effacement of the right lateral ventricle. There is some enlargement of the left temporal horn, which could suggest a trapped component. There is 4 mm rightward shift of midline structures due to mass effect. No herniation. No evidence of acute infarct or acute intracranial hemorrhage\".  Neurosurgery was contacted concerning these findings, and instructed to continue with current regimen of decadron.  Will see in am.     Upon further chart review patient has had multiple ED visits since January of this year.    01/18/2024:  Patient is a 68 y.o. male presented with multiple falls with lightheadedness and possible syncope.  Hemoglobins initially found to be low at 9.8 slightly increased MCV.  UA was obtained and found to be unremarkable.  Chest x-ray showed acute right-sided rib fracture and CT of head showed no acute intracranial process with postoperative changes of the right mastoid/middle ear as well as left mastoid middle ear effusion.  CT of C-spine ordered in the ED showed lucency of the posterior superior C1 lateral mass with radiologist noting they could not exclude a nondisplaced acute fracture in the setting of trauma. Changes of ACDF from C3-C6 were noted as well as partially calcified thyroid nodule were also noted with recommended for " thyroid ultrasound.  EKG showed sinus rhythm at 66 with PACs.  Rigid cervical collar was applied in the ED and neurosurgery was consulted who evaluated patient recommending continued cervical collar placement at all times and outpatient follow-up in 8 weeks.  TSH was assessed and found to be 2.370.  A1c 5.50, lipid panel showed total cholesterol of 76 with an LDL of 31 and HDL of 32, B12 found to be less than 150.  Iron profile showed an iron level of 33 with a TSAT of 7 and IV Ferrlecit was ordered x 1.  He will be started on B12 supplementation as well as p.o. iron with instructions to follow-up with PCP for continued monitoring.  Patient had recent visit with cardiologist who had ordered outpatient echocardiogram although given his symptoms and recent falls this was ordered while he was admitted showed an EF of 56-60%.  PT/OT were consulted with recommendations for home health occupational therapy and rolling walker which be ordered at discharge.  On the morning after presentation patient reported that he does use methamphetamine with some frequency and that his falls may be related to this use.  Urine drug screen was ordered and found to be positive for amphetamines.   evaluated patient and discussed options for cessation assistance with referrals made to [facility] for assistance with home health as well as Medicaid enrollment.  Discussed complete case and findings with patient who reports he feels ready for discharge and able to manage at home.  At this time patient is felt to be in good condition for discharge with close follow-up with his PCP as well as cardiology on an outpatient basis.  His full testing/results and plan were discussed with patient along with concerning/alarm symptoms for which to call 911/return to the ED..  All questions were answered he verbalizes his understanding and agreement.     02/01/2024:  Patient presented with intermittent lightheadedness/dizziness.  Was placed on a  cardiac monitor and an IV established, as he reported frequent falls over the last few weeks, CT of the head was obtained was negative intracranial hemorrhage no evidence of fracture CT of the cervical spine did note a C1 lateral fracture, patient was placed in a hard c-collar, spoke with Zander nurse practitioner with neurosurgery, advised hard c-collar and follow-up in 6 weeks. CBC noted no leukocytosis hemoglobin noted at 9.8 patient denies known blood loss, reviewed 2 years ago notes a hemoglobin of 12.3. CMP notes no acute renal insufficiency or electrolyte imbalance chest x-ray was negative for pneumonia. As patient presented with frequent falls he will be placed in the ED observation unit for plan for consult with PT OT, Zander with neurosurgery did evaluate the patient at bedside as well. I did discuss plan for admission and continued monitoring to which patient is given verbal understanding. Spoke with Vikas BARRY in the ED observation unit who agreed to admission.     02/05/2024:  Patient is a 68-year-old male who was sent in by caretaker due to shortness of breath. He has no complaints at this time. Denies chest pain cough fever shortness of breath or other complaint.  Chest x-ray shows no cardiomegaly fusion or infiltrate. Patient has no EKG changes. Reexamination patient has no complaints with a benign exam and normal vital signs. Will be discharged. He will have home health start visitations. Family will follow with her family physician.       ACP: Spoke with Patient son, and only KRISHNAXUAN Sorto who states patient is a DNR with full intervention.    Patient was seen and examined on 02/12/24 at 03:20 EST .    Subjective / Review of systems     Review of Systems   Unable to perform ROS: Mental status change        Past Medical/Surgical/Social/Family History & Allergies     Past Medical History:   Diagnosis Date    CAD (coronary artery disease)     COPD (chronic obstructive pulmonary disease)     Depression      Depression     Dizziness     Falls     Hyperlipidemia     Hypertension     Low back pain       Past Surgical History:   Procedure Laterality Date    CARDIAC CATHETERIZATION N/A 3/22/2021    Procedure: Left Heart Cath with angiogram;  Surgeon: Mack French MD;  Location: [facility] CATH INVASIVE LOCATION;  Service: Cardiovascular;  Laterality: N/A;    CARDIAC CATHETERIZATION N/A 3/22/2021    Procedure: Left ventriculography;  Surgeon: Makc French MD;  Location: [facility] CATH INVASIVE LOCATION;  Service: Cardiovascular;  Laterality: N/A;    CERVICAL SPINE SURGERY      INNER EAR SURGERY      WISDOM TOOTH EXTRACTION        Social History     Socioeconomic History    Marital status: Single   Tobacco Use    Smoking status: Every Day     Packs/day: .5     Types: Cigarettes    Smokeless tobacco: Never   Vaping Use    Vaping Use: Never used   Substance and Sexual Activity    Alcohol use: Never    Drug use: Yes     Types: Amphetamines, Marijuana     Comment: last used meth smoked 1/16/24    Sexual activity: Defer      Family History   Problem Relation Age of Onset    Cancer Mother     Hypertension Mother     Cancer Father       Allergies   Allergen Reactions    Pregabalin Swelling    Gluten Meal GI Intolerance      Social Determinants of Health     Tobacco Use: High Risk (1/18/2024)    Patient History     Smoking Tobacco Use: Every Day     Smokeless Tobacco Use: Never     Passive Exposure: Not on file   Alcohol Use: Not At Risk (1/17/2024)    AUDIT-C     Frequency of Alcohol Consumption: Never     Average Number of Drinks: Patient does not drink     Frequency of Binge Drinking: Never   Financial Resource Strain: Not on file   Food Insecurity: No Food Insecurity (1/18/2024)    Hunger Vital Sign     Worried About Running Out of Food in the Last Year: Never true     Ran Out of Food in the Last Year: Never true   Transportation Needs: No Transportation Needs (1/18/2024)    PRAPARE - Transportation     Lack of Transportation  (Medical): No     Lack of Transportation (Non-Medical): No   Physical Activity: Not on file   Stress: Not on file   Social Connections: Unknown (10/8/2023)    Family and Community Support     Help with Day-to-Day Activities: Not on file     Lonely or Isolated: Not on file   Interpersonal Safety: Unknown (2/12/2024)    Abuse Screen     Unsafe at Home or Work/School: unable to answer (comment required)     Feels Threatened by Someone?: unable to answer (comment required)     Does Anyone Keep You from Contacting Others or Doint Things Outside the Home?: unable to answer (comment required)     Physical Sign of Abuse Present: no   Depression: Not on file   Housing Stability: Not At Risk (1/18/2024)    Housing Stability     Current Living Arrangements: home     Potentially Unsafe Housing Conditions: none   Utilities: Not At Risk (1/18/2024)    [facility] Utilities     Threatened with loss of utilities: No   Health Literacy: Unknown (1/18/2024)    Education     Help with school or training?: Not on file     Preferred Language: English   Employment: Unknown (10/8/2023)    Employment     Do you want help finding or keeping work or a job?: Not on file   Disabilities: Not At Risk (1/17/2024)    Disabilities     Concentrating, Remembering, or Making Decisions Difficulty: no     Doing Errands Independently Difficulty: no        Home Medications     Prior to Admission medications    Medication Sig Start Date End Date Taking? Authorizing Provider   albuterol sulfate  (90 Base) MCG/ACT inhaler Inhale 2 puffs Every 4 (Four) Hours As Needed for Wheezing.    Naveen Angeles MD   aspirin 81 MG EC tablet Take 1 tablet by mouth Daily.    ProviderNaveen MD   atorvastatin (LIPITOR) 10 MG tablet Take 1 tablet by mouth Every Night.    Naveen Angeles MD   citalopram (CeleXA) 40 MG tablet Take 1 tablet by mouth Every Night. 7/20/04   Naveen Angeles MD   diphenoxylate-atropine (LOMOTIL) 2.5-0.025 MG per tablet Take 1  tablet by mouth 2 (Two) Times a Day As Needed for Diarrhea.    ProviderNaveen MD   ferrous sulfate 324 MG tablet delayed-release Take 1 tablet by mouth Daily With Breakfast. 1/18/24   Pedro Blum PA-C   fluticasone (FLONASE) 50 MCG/ACT nasal spray 1 spray into the nostril(s) as directed by provider Daily. 4/3/20   Naveen Angeles MD   gabapentin (NEURONTIN) 300 MG capsule Take 1 capsule by mouth 3 (Three) Times a Day.    Naveen Angeles MD   meloxicam (MOBIC) 15 MG tablet Take 1 tablet by mouth Daily As Needed. 4/3/20   Naveen Angeles MD   metoprolol succinate XL (TOPROL-XL) 25 MG 24 hr tablet Take 1 tablet by mouth Daily. 2/2/22   Mack French MD   mirtazapine (REMERON) 15 MG tablet Take 1 tablet by mouth Every Night. 4/3/20   Naveen Angeles MD   tamsulosin (FLOMAX) 0.4 MG capsule 24 hr capsule Take 1 capsule by mouth Every Night. 4/3/20   Naveen Angeles MD   temazepam (RESTORIL) 15 MG capsule Take 1 capsule by mouth At Night As Needed for Sleep.    Naveen Angeles MD   tiZANidine (ZANAFLEX) 4 MG tablet Take 1 tablet by mouth Every Night. 4/4/20   Naveen Angeles MD   vitamin B-12 (CYANOCOBALAMIN) 1000 MCG tablet Take 1 tablet by mouth Daily. 1/18/24   Pedro Blum PA-C        Objective / Physical Exam     Vital signs:  Temp: 99.5 °F (37.5 °C)  BP: 124/62  Heart Rate: 60  Resp: 23  SpO2: 100 %  Weight: 70.3 kg (155 lb)    Admission Weight: Weight: 70.3 kg (155 lb)    Physical Exam  Constitutional:       Appearance: He is toxic-appearing.   Eyes:      Pupils: Pupils are unequal.      Right eye: Pupil is sluggish.      Left eye: Pupil is sluggish.   Neck:      Comments: C-collar in place  Cardiovascular:      Rate and Rhythm: Normal rate. Rhythm regularly irregular.   Pulmonary:      Effort: Pulmonary effort is normal.      Breath sounds: Normal breath sounds.   Abdominal:      General: Abdomen is flat.      Palpations: Abdomen is soft.   Skin:      General: Skin is warm.      Coloration: Skin is pale.   Neurological:      Comments: Minimally responsive to noxious stimuli  No purposeful movement in extremities  Pupils unequal and sluggish   Psychiatric:      Comments: Unable to assess due to unresponsiveness        Labs     Results from last 7 days   Lab Units 02/12/24 2001 02/12/24  1859   WBC 10*3/mm3  --  12.30*   HEMATOCRIT %  --  33.8*   HEMATOCRIT POC % 32*  --    PLATELETS 10*3/mm3  --  217      Results from last 7 days   Lab Units 02/12/24 2011 02/12/24 2001   SODIUM mmol/L 132*  --    POTASSIUM mmol/L 4.1  --    CHLORIDE mmol/L 98  --    CO2 mmol/L 17.0*  --    BUN mg/dL 30*  --    CREATININE mg/dL 0.80 0.86        Imaging     Chest X ray: My independent assessment showed no infiltrates or effusions; chronic rib fractures noted    EKG: My independent evaluation showed normal sinus rhythm, no ST -T changes    Current Medications     Scheduled Meds:  dexAMETHasone, 4 mg, Intravenous, Q6H  levETIRAcetam, 500 mg, Intravenous, Q12H  senna-docusate sodium, 2 tablet, Oral, BID  sodium chloride, 10 mL, Intravenous, Q12H         Continuous Infusions:        Patient continues to be critically ill, remains at risk of clinical deterioration or death and needed high complexity decision making. I have spent a total of 75 minutes providing critical care services to this patient including but not limited to: review of labs/ microbiology/imaging/medications, serial monitoring of vital signs,  review of other consultant's notes, review of events in the last 24 hrs, monitoring input/output, review of treatment plan with bedside nurse, RT and other treatment team, management of life support and nutrition needs. I also spoke with patient son about the plan of care and answered all questions.    Time spent in performing separately billable procedures and updating family is not included in the critical care time.       ATIF Bolden   Critical Care  02/13/24    03:20 EST      Electronically signed by Pedro Rashid DO at 24 1122             Satinder Matos PA   Physician Assistant  Neurosurgery     Progress Notes     Attested     Date of Service: 24  Creation Time: 24     Attested           Attestation signed by Schuyler Manuel MD at 24 0833     I have reviewed this documentation and agree.               Expand All Collapse All    Neurosurgery Progress Note     Date: 24      Patient: Jersey Celaya                      Sex: male                                : 1955        SUBJECTIVE     CC: Altered mental status     Interval history:  No significant changes to mental status or general neurologic function today.            Current Medications:  Scheduled Meds:citalopram, 20 mg, Nasogastric, Nightly  dexAMETHasone, 4 mg, Intravenous, Q6H  insulin lispro, 2-9 Units, Subcutaneous, Q6H  levETIRAcetam, 500 mg, Oral, Q12H  metoprolol tartrate, 25 mg, Nasogastric, Q12H  midazolam, 2 mg, Intravenous, Once  sodium chloride, 10 mL, Intravenous, Q12H        Continuous Infusions:      PRN Meds:   senna-docusate sodium **AND** polyethylene glycol **AND** bisacodyl **AND** bisacodyl    Calcium Replacement - Follow Nurse / BPA Driven Protocol    dextrose    dextrose    glucagon (human recombinant)    hydrALAZINE    Magnesium Standard Dose Replacement - Follow Nurse / BPA Driven Protocol    metoprolol tartrate    nitroglycerin    ondansetron ODT **OR** ondansetron    Phosphorus Replacement - Follow Nurse / BPA Driven Protocol    Potassium Replacement - Follow Nurse / BPA Driven Protocol    [COMPLETED] Insert Peripheral IV **AND** sodium chloride    sodium chloride    sodium chloride    sodium chloride    temazepam        OBJECTIVE  Vitals          Vitals:     24 0500 24 0600 24 0800 24 1200   BP: 100/72 102/55       Pulse: 117 (!) 128       Resp:           Temp:     99.2 °F (37.3 °C) 98.6 °F (37 °C)    TempSrc:     Axillary Axillary   SpO2: 97% 95%       Weight:           Height:                    Physical exam     General  -  awake, slightly agitated  -Confused  HEENT  - Normocephalic, atraumatic  - PERRLA, EOM intact  Respiratory  - Normal respiratory rate and effort  Musculoskeletal  - Range of motion and strength intact all 4 extremities  Skin  - Warm and dry, no bleeding, bruising, or rash  NEUROLOGIC  - Awake, oriented to self  - GCS: 13 (4-4-5)  - Moves all extremities symmetrically with good strength  - Does not follow commands        Results review  CBC Results   CBC            2/17/2024    06:01 2/18/2024    05:56 2/19/2024    10:31   CBC   WBC 12.20  16.60  14.00    RBC 3.43  3.64  3.77    Hemoglobin 11.0  11.8  12.2    Hematocrit 34.8  37.4  38.9    .4  102.7  103.0    MCH 32.1  32.5  32.3    MCHC 31.7  31.6  31.4    RDW 21.1  21.7  21.9    Platelets 189  127  95             Basic Metabolic Profile Reults:   BMP            2/17/2024    00:03 2/17/2024    06:01 2/17/2024    07:50 2/17/2024    20:20 2/18/2024    05:56 2/18/2024    21:21 2/19/2024    06:40   BMP   BUN   36      47    70    Creatinine   0.73      0.72    1.09    Sodium 146  146  145  147  149     149  153  154    Potassium   4.2      4.3    4.4    Chloride   112      114    117    CO2   28.0      28.0    27.0    Calcium   8.6      8.4    8.4                Spine plain films from last 21 days:           CT spine results from last 21 days:   CT Cervical Spine Without Contrast     Result Date: 2/12/2024  CT CERVICAL SPINE WO CONTRAST Date of Exam: 2/12/2024 7:10 PM EST Indication: fall. Comparison: CT of the cervical spine performed on January 17, 2024 Technique: Axial CT images were obtained of the cervical spine without contrast administration.  Sagittal and coronal reconstructions were performed.  Automated exposure control and iterative reconstruction methods were used. Findings: No evidence of acute fracture or traumatic  subluxation.  No evidence of aggressive lesions. The prevertebral soft tissues appear unremarkable.Surrounding soft tissues appear within normal limits. Again noted are postsurgical changes of ACDF from C3-C6. Patient is post C4 corpectomy. Osseous fusion at C6-C7 is again visualized. No CT evidence of high-grade canal stenosis. Streak artifact from hardware limits evaluation. The lung apices appear clear. Partially calcified right thyroid lobe nodule is again visualized. Correlate with ultrasound if not already performed.      Impression: Impression: No evidence of acute fracture or traumatic subluxation of the cervical spine. Postsurgical changes from C3-C6. Stable partially calcified right thyroid lobe nodule. Correlate with ultrasound if not already performed. Electronically Signed: Emil Norwood MD  2/12/2024 7:42 PM EST  Workstation ID: [id number]      MRI spine results from the last 21 days:           CT head results from the last 21 days:   CT Head Without Contrast     Result Date: 2/12/2024  CT HEAD WO CONTRAST Date of Exam: 2/12/2024 7:10 PM EST Indication: fall. Comparison: January 17, 2024 Technique: Axial CT images were obtained of the head without contrast administration.  Coronal reconstructions were performed.  Automated exposure control and iterative reconstruction methods were used. Findings: There is hypoattenuation with mass effect in the posterior left periventricular white matter in the area of the left posterior lateral ventricle. There is effacement of the posterior left lateral ventricle. Within the area of hypoattenuation there appears to be an ill-defined mass estimated to measure up to 3-4 cm in diameter. There is some peripheral hyperdense attenuation. These findings represent an interval change. There is effacement of posterior left sulci. There is estimated to be approximately 2-3 mm rightward midline shift at the level of the foramen of Garcia. No definite extra-axial collection is  seen. No other definite mass or significant edema. No significant hemorrhage is seen. The basal cisterns appear patent. There is opacification of the mastoid air cells and middle ear spaces, as before. Right ossicles are not visualized. There is hyperdense attenuation in the area of the right external auditory canal and posteriorly along the right mastoid. There is partial opacification and mucosal thickening of the ethmoid sinuses. Trace air-fluid levels are seen in the right frontal, sphenoid, and maxillary sinuses. No definite acute calvarial abnormality. There is a posterior right scalp contusion/hematoma. There also appears to be a small left frontal scalp contusion.      Impression: Impression: 1.Findings suspicious for a mass in the posterior left periventricular white matter with surrounding edema, and mass effect. There is effacement of the posterior left lateral ventricle, effacement of the left posterior cerebral sulci, and 2-3 mm rightward midline shift. Finding is indeterminate but concerning for neoplasm or abscess. MRI with and without contrast is recommended for further evaluation. 2.There appears to be slight peripheral hyperdensity of the mass, and a component of hemorrhage cannot be excluded. This does not appear typical of an acute intraparenchymal hemorrhage, and no other hemorrhage is seen at this time. 3.Opacification of the right mastoid air cells and middle ear spaces with absence of the ossicles, as before. There is also hyperdense attenuation in the area of the right external auditory canal and posteriorly along the right mastoid. Findings could be  related to chronic otomastoiditis with possible postoperative changes on the right. 4.Paranasal sinus mucosal thickening with trace air-fluid levels which could indicate acute sinusitis. Results were called to the ordering provider by Dr. Louie at 2/12/2024 7:46 PM EST. Electronically Signed: Quincy Louie  2/12/2024 7:57 PM EST  Workstation ID:  [id number]        MRI of head results from the last 21 days:   MRI Brain Without Contrast     Result Date: 2/17/2024  MRI BRAIN WO CONTRAST Date of Exam: 2/17/2024 4:20 PM EST Indication: Brain/CNS neoplasm, assess treatment response.  Comparison: Brain MRI dated 2/13/2024 Technique:  Routine multiplanar/multisequence sequence images of the brain were obtained without contrast administration. FINDINGS: Extremely limited examination for assessment of treatment response. There is significant patient motion in most of the imaging sequences, and IV contrast was not administered as the patient was unable to sign contrast consent. Complex mass lesion is seen centered about the left parieto-occipital region crossing the midline along the splenium of the corpus callosum. Extensive surrounding vasogenic edema, most pronounced in the left parieto-occipital region. The dominant left-sided component measuring 5.9 x 2.7 cm and the smaller right-sided component measuring approximately 2.8 x 1.5 cm. This appears similar or slightly smaller in comparison with 2/13/2024. Of note, the SWI signal hypointensity within the lesion has decreased and is now relatively confined to the periphery of the lesion. 3 to 4 mm left right midline shift is again suggested. Mass effect with sulcal effacement again noted within both parietal lobes. No hydrocephalus is seen. No diffusion restriction is identified to suggest acute infarct. The visualized intracranial flow-voids appear unremarkable. The paranasal sinuses and mastoid air cells show no fluid signal. Orbital structures appear grossly unremarkable. The visualized superficial soft tissues and cervical spine demonstrate no significant abnormality.      Impression: Extremely limited examination for assessment of treatment response. There is significant patient motion in most of the imaging sequences, and IV contrast was not administered as the patient was unable to sign contrast consent. Complex  mass lesion is seen centered about the left parieto-occipital region crossing the midline along the splenium of the corpus callosum. Extensive surrounding vasogenic edema, most pronounced in the left parieto-occipital region. The dominant left-sided component measuring 5.9 x 2.7 cm and the smaller right-sided component measuring approximately 2.8 x 1.5 cm. This appears similar or slightly smaller in comparison with 2/13/2024. Of note, the SWI signal hypointensity within the lesion has decreased and is now relatively confined to the periphery of the lesion. 3 to 4 mm left right midline shift is again suggested. No findings to suggest acute infarct. Consider repeat examination, possibly utilizing patient sedation. Please see above for additional details. Electronically Signed: Toni De La Fuente MD  2/17/2024 5:33 PM EST  Workstation ID: [id number]     MRI Brain With & Without Contrast     Result Date: 2/13/2024  MRI BRAIN W WO CONTRAST Date of Exam: 2/13/2024 1:01 AM EST Indication: Head trauma, skull fracture or hematoma (Age 18-64y).  Comparison: CT head 2/12/2024. Technique:  Routine multiplanar/multisequence sequence images of the brain were obtained before and after the uneventful administration of Prohance. Findings: There is redemonstration of a large mass that appears centered within the left parietal white matter. The mass involves the splenium of the corpus callosum extensively and extends into the posterior body and crosses the midline into the right parietal periventricular white matter. The solid discrete components of the mass measure up to 7.8 x 5.9 cm in the axial plane. There is also a large region of surrounding T2/FLAIR hyperintense signal that is nearly confluent in the periventricular white matter and extends into the body and genu of the corpus callosum, as well as the left frontal periventricular white matter and into the posterior aspects of both basal ganglia. Abnormal signal infiltrates the  bilateral parietal and occipital white matter. The mass demonstrates an irregular discontinuous rim of enhancement and also exhibits some diffusion restriction suggesting necrosis and high cellularity. The mass also exhibits extensive susceptibility artifact suggesting tumoral hemorrhage. There is significant mass effect in both parietal lobes with sulcal effacement on the left. There is near complete effacement of the posterior aspect of the left lateral ventricle and partial effacement of the right lateral ventricle. There is some enlargement of  the left temporal horn, which could suggest a trapped component. The third and fourth ventricles appear normal size. There is approximately 4 mm rightward shift of midline structures due to mass effect. There is no evidence of an acute infarct. No herniation. There appears to be postoperative change at the right mastoid. The left mastoid is underpneumatized. There is mild paranasal sinus mucosal disease. There is scalp edema most pronounced at the right parieto-occipital region. The orbits appear unremarkable. Cerebellum is within normal limits. There are cervical degenerative and postoperative findings.      Impression: Impression: 1.Large mass centered in the left parietal white matter and extensively involving the corpus callosum crossing to the right parietal white matter with extensive surrounding signal abnormality concerning for infiltrating tumor. This is most consistent with a high-grade glioma. 2.There is extensive intratumoral susceptibility artifact suggesting hemorrhagic component. 3.There is significant mass effect with sulcal effacement on the left and partial effacement of the right lateral ventricle. There is some enlargement of the left temporal horn, which could suggest a trapped component. 4.There is 4 mm rightward shift of midline structures due to mass effect. No herniation. 5.No evidence of acute infarct or acute intracranial hemorrhage.  Electronically Signed: Dieter Quinones MD  2024 1:30 AM EST  Workstation ID: [id number]            ASSESSMENT/PLAN  This is a 68 y.o. 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  His neurologic status remains largely unchanged with generalized cognitive deficits but good sensorimotor function. Repeat MRI was completed, but without contrast.  There is also substantial motion artifact.  Overall it is unhelpful in terms of assessing response to steroid treatment.       Palliative care has been consulted and their note was reviewed.  Appears family is not interested in further workup and is planning for hospice.  Nothing further to offer from a neurosurgical standpoint.  Neurosurgery will sign off at this time.  Call with any questions or concerns.        I discussed my assessment and recommendations with Dr. Kaleb Matos PA-C  24  13:15 EST        Part of this note may be an electronic transcription/translation of spoken language to printed text using the Dragon Dictation System.               Cosigned by: Schuyler Manuel MD at 24 0833     Revision History          Stephen Carney MD   Physician  Hospitalist     Progress Notes     Signed     Date of Service: 24 1046  Creation Time: 24 104     Signed           Critical Care Progress Note      Jersey Celaya : 1955 MRN:[id number] LOS:4  Rm: 2316/1      Principal Problem: AMS (altered mental status)      Reason for follow up: All the medical problems listed below     Summary      Jersey Celaya is a 68 y.o. male with PMH of hypertension, COPD, CAD, anxiety, depression, cervical disc degeneration was brought to the hospital via EMS and was admitted with a principal diagnosis of AMS (altered mental status).  Information for HPI taken from chart review and discussion with patient's son Macario as  "patient is minimally responsive at time of assessment.  It is unclear who called EMS as there was no family/friends that came with the patient.  In the emergency department patient was responding only to painful stimuli.  He had no purposeful movement to his extremities.  CT head was obtained which showed \"Findings suspicious for a mass in the posterior left periventricular white matter with surrounding edema, and mass effect. There is effacement of the posterior left lateral ventricle, effacement of the left posterior cerebral sulci, and 2-3 mm rightward midline shift. Finding is indeterminate but concerning for neoplasm or abscess. MRI with and without contrast is recommended for further evaluation. 2.There appears to be slight peripheral hyperdensity of the mass, and a component of hemorrhage cannot be excluded. This does not appear typical of an acute intraparenchymal hemorrhage, and no other hemorrhage is seen at this time.  3.Opacification of the right mastoid air cells and middle ear spaces with absence of the ossicles, as before. There is also hyperdense attenuation in the judah of the right external auditory canal and posteriorly along the right mastoid. Findings could be  related to chronic otomastoiditis with possible postoperative changes on the right.  4.Paranasal sinus mucosal thickening with trace air-fluid levels which could indicate acute sinusitis\".  Per ER documentation, neurosurgery was consulted and recommended MRI in the morning, and treatment with IV Keppra loading dose and IV Decadron 10 mg every 6 hours.  Patient was in c-collar on arrival, after chart review this was applied during emergency room visit in January of this year (see below).     Speaking with patient's son Macario, he states that his father \"has been declining for over a year\".  States that he has had to bathe his father, help him eat, and dress him.  States that he has been trying to get him placed in an assisted living or " "establish home health.  On assessment patient's pupils are unequal, and he only responds to vigorous stimulation.  He is oxygenating, and an ABG did not show hypoxia or hypercapnia.  MRI was obtained which showed \"large mass centered in the left parietal white matter and extensively involving the corpus callosum crossing to the right parietal white matter with extensive surrounding signal abnormality concerning for infiltrating tumor. This is most consistent with a high-grade glioma. There is extensive intratumoral susceptibility artifact suggesting hemorrhagic component. There is significant mass effect with sulcal effacement on the left and partial effacement of the right lateral ventricle. There is some enlargement of the left temporal horn, which could suggest a trapped component. There is 4 mm rightward shift of midline structures due to mass effect. No herniation. No evidence of acute infarct or acute intracranial hemorrhage\".  Neurosurgery was contacted concerning these findings, and instructed to continue with current regimen of decadron.  Neurosurgery evaluating patient, recommending IV steroids and hypertonic IVFs and monitoring.  Hospice consulted upon family request.  Will hold on hypertonic IVF until decision made whether patient will discharge home with hospice or continue treatment.     Significant Events      02/17/24 : Mentation waxing and waning, restless and agitated at times. Tolerating room air.  3% saline discontinued per QI.  CT C/A/P did not reveal any obvious metastatic disease.  MRI brain has not been done due to pt agitation and movement.  Stable for transfer out of the ICU.     Of note, patient was deemed ineligible for [facility] hospice, would have to be discharged home with hospice.  Hospice discussed with patient's family.  He remains DNR/DNI   2/18     Seen with RN  Continues to be confused  Agitated  Unable to do MRI  Downgraded from ICU  Will get palliative care for goals of care   "   2./19  Patient sodium is up to 152  Continue to be confused agitated noncommunicative  NG tube in place  Nephrology will be consulted for hypernatremia  Patient is to not resuscitate  Patient with a high grade glioma in the left parieto-occipital region with mass effect  2/20  2 continue to be confused  Seen per nephrology  Sodium is up to 156  Patient with glioma followed up with neurosurgery  His prognosis is poor  Tube feeding was held as patient would not sit still with 30 degrees open may need bolus feeding consider continuous feeding  Patient has been seen per cardiology for sinus tachycardia currently on beta-blocker with no need for further evaluation  Continue to be encephalopathic  Neurosurgery is not planning on any intervention  Continue Decadron and Keppra on hypertonic saline  Family requested palliative care  Will get hospice consult for further evaluation  Assessment / Plan      Acute encephalopathy  Large left parietal mass in white matter  S/p fall at home, recurrent falls per documentation  -Remains confused/agitated  -CT head 2/12 and MRI brain 2/13 with large mass in the left parietal  noted.   -Repeat MRI brain, has not been done due to patient agitation, movement would produce low quality diagnostic imaging  -CT C/A/P 2/16 with no obvious metastatic disease  -Neurosurgery following.  Await results of MRI brain for further treatment/surgical recommendations  -IV Decadron every 6 hours  -IV Keppra for seizure prophylaxis  -Hypertonic saline discontinued, goal Na of 145-150 has been stable.  -Heme/Onc following for treatment recommendations   -Palliative care consulted as requested, doesn't meet GIP criteria, but may discharge home with hospice.     Rhabdomyolysis  -Likely traumatic, patient with multiple falls per documentation  -CK 4195, repeat CK  2755  -Patient does not meet SIRS criteria, do not suspect infection at this time  -Continue IVFs.     Steroid induced hyperglycemia  Add SSI  q6H     Chronic:  Hypertension: home antihypertensive of Toprol XL.  Due to marginal hypotension restarted BB at low-dose   COPD: Not in exacerbation, oxygen supplementation as needed. Tolerating room air  CAD: Continue home aspirin once able clinically appropriate  Anxiety/depression: Resume home citalopram, hold remeron for now  Vitamin D deficiency: restart home cyanocobalamin once clinically appropriate  Peripheral neuropathy/RLS: Continue gabapentin, zanaflex when appropriate. Currently held for neuro exams and wakefulness  Enlarged prostate: Continue Flomax when able to take PO medications.     Disposition:  NABIL.  Possible discharge home with hospice if treatment showing no improvement.  Hospice following.     Code status:   Medical Intervention Limits: NO intubation (DNI)  Code Status (Patient has no pulse and is not breathing): No CPR (Do Not Attempt to Resuscitate)  Medical Interventions (Patient has pulse or is breathing): Limited Support        Nutrition:   NPO Diet NPO Type: Strict NPO   Diet, Tube Feeding Tube Feeding Formula: Nutren 2.0 (TwoCal); Tube Feeding Type: Continuous; Continuous Tube Feeding Start Rate (mL/hr): 30; Then Advance Rate By (mL/hr): 10; Every __ Hours: 6; To Goal Rate of (mL/hr): 50      DVT prophylaxis:  Mechanical DVT prophylaxis orders are present.           Subjective / Review of systems      Review of Systems   Unable to perform ROS: Mental status change   Nonverbal      Objective / Physical Exam      Vital signs:  Temp: 98.5 °F (36.9 °C)  BP: 126/70  Heart Rate: (!) 121  Resp: 24  SpO2: 95 %  Weight: 56.6 kg (124 lb 12.5 oz)     Admission Weight: Weight: 70.3 kg (155 lb)  Current Weight: Weight: 56.6 kg (124 lb 12.5 oz)     Input/Output in last 24 hours:     Intake/Output Summary (Last 24 hours) at 2/17/2024 0816  Last data filed at 2/17/2024 0345        Gross per 24 hour   Intake 1817 ml   Output --   Net 1817 ml      Net IO Since Admission: 3,835 mL [02/17/24 0816]       Physical Exam  Vitals and nursing note reviewed.   Constitutional:       Appearance: He is ill-appearing. He is not diaphoretic.      Comments: Lethargic at rest, agitated with stimulation.  Appears older than stated age. Cachectic and emaciated    HENT:      Head: Normocephalic and atraumatic.      Comments: Bitemporal wasting. Periorbital tissue wasting     Nose: Nose normal.      Mouth/Throat:      Mouth: Mucous membranes are dry.   Eyes:      General: No scleral icterus.     Conjunctiva/sclera: Conjunctivae normal.      Pupils: Pupils are equal, round, and reactive to light.      Comments: Pupils dilated but equal and briskly responsive   Cardiovascular:      Heart sounds: Normal heart sounds. No murmur heard.     Comments: SR-ST  Pulmonary:      Effort: Pulmonary effort is normal. No respiratory distress.      Breath sounds: Normal breath sounds. No wheezing, rhonchi or rales.      Comments: Bases diminished  Abdominal:      General: There is no distension.      Tenderness: There is no guarding.      Comments: Scaphoid   Hypoactive bowel sounds   Musculoskeletal:         General: No deformity.      Cervical back: Neck supple. No rigidity.      Right lower leg: No edema.      Left lower leg: No edema.   Skin:     General: Skin is warm and dry.      Coloration: Skin is pale.   Neurological:      Comments: Nonverbal to conversation, unable to assess orientation. Lethargic, restless at times. Moves all extremities spontaneously. Not following commands.  W/d from noxious stimuli   Psychiatric:      Comments: Anxious/restless.            Radiology and Labs                      Results from last 7 days   Lab Units 02/17/24  0601 02/16/24  0354 02/15/24  0441 02/14/24  0551 02/13/24  0433   WBC 10*3/mm3 12.20* 8.00 7.80 13.40* 9.90   HEMOGLOBIN g/dL 11.0* 10.6* 9.7* 9.8* 10.5*   HEMATOCRIT % 34.8* 33.4* 29.5* 30.0* 31.9*   PLATELETS 10*3/mm3 189 196 207 199 182                                             Results from  last 7 days   Lab Units 02/17/24  0750 02/17/24  0601 02/17/24  0003 02/16/24 2048 02/16/24  1212 02/16/24  0810 02/16/24  0354 02/16/24  0101 02/15/24  0827 02/15/24  0441 02/14/24  0801 02/14/24  0551 02/13/24  1014 02/13/24  0433   SODIUM mmol/L 145 146* 146*  --  145 148* 147* 144   < > 141   < > 136   < > 132*   POTASSIUM mmol/L  --  4.2  --  4.5 3.6 3.8 4.3 3.9   < > 4.0   < > 4.5   < > 4.3   CHLORIDE mmol/L  --  112*  --   --  110* 112* 112* 110*   < > 107   < > 101   < > 95*   CO2 mmol/L  --  28.0  --   --  27.0 25.0 24.0 25.0   < > 25.0   < > 23.0   < > 20.0*   BUN mg/dL  --  36*  --   --  28* 31* 31* 31*   < > 29*   < > 26*   < > 29*   CREATININE mg/dL  --  0.73*  --   --  0.57* 0.63* 0.63* 0.61*   < > 0.67*   < > 0.64*   < > 0.72*   GLUCOSE mg/dL  --  152*  --   --  150* 161* 142* 184*   < > 151*   < > 127*   < > 121*   MAGNESIUM mg/dL  --  2.1  --   --   --   --  2.0  --   --  2.0  --  2.0  --  1.9   PHOSPHORUS mg/dL  --  2.1*  --   --   --  3.0 2.1*  --   --  2.6  --  2.9  --  4.2    < > = values in this interval not displayed.                      Results from last 7 days   Lab Units 02/17/24  0601 02/16/24  0354 02/15/24  0441 02/14/24  0551 02/13/24  0433   ALK PHOS U/L 121* 143* 148* 168* 199*   AST (SGOT) U/L 50* 89* 136* 145* 161*   ALT (SGPT) U/L 78* 96* 98* 74* 62*              Results from last 7 days   Lab Units 02/12/24 2001   PH, ARTERIAL pH units 7.478*   PCO2, ARTERIAL mm Hg 23.1*   PO2 ART mm Hg 132.2*   O2 SATURATION ART % 99.3*   FIO2 % 28   HCO3 ART mmol/L 17.1*   BASE EXCESS ART mmol/L -4.9*         CT Chest With Contrast Diagnostic     Result Date: 2/16/2024  Impression: 1.No definitive metastatic disease in the chest, abdomen, or pelvis identified. 2.There is 7 mm nonspecific right hepatic lobe hypodensity, likely a cyst although too small to characterize. 3.Subacute to more chronic right-sided rib fractures. Correlate with patient history. Electronically Signed: Louis Richards MD   2/16/2024 11:27 AM EST  Workstation ID: [id number]     CT Abdomen Pelvis With Contrast     Result Date: 2/16/2024  Impression: 1.No definitive metastatic disease in the chest, abdomen, or pelvis identified. 2.There is 7 mm nonspecific right hepatic lobe hypodensity, likely a cyst although too small to characterize. 3.Subacute to more chronic right-sided rib fractures. Correlate with patient history. Electronically Signed: Louis Richards MD  2/16/2024 11:27 AM EST  Workstation ID: [id number]         Current medications      Scheduled Meds:   citalopram, 20 mg, Nasogastric, Nightly  dexAMETHasone, 4 mg, Intravenous, Q6H  insulin lispro, 2-9 Units, Subcutaneous, Q6H  levETIRAcetam, 500 mg, Intravenous, Q12H  metoprolol tartrate, 12.5 mg, Nasogastric, Q12H  midazolam, 2 mg, Intravenous, Once  sodium chloride, 10 mL, Intravenous, Q12H  sodium phosphate, 15 mmol, Intravenous, Once           Continuous Infusions:            Plan discussed with RN. Reviewed all other data in the last 24 hours, including but not limited to vitals, labs, microbiology, imaging and pertinent notes from other providers.                                 Sharita Wasserman APRN   Nurse Practitioner  Palliative Care     Consults     Attested     Date of Service: 02/19/24 1034  Creation Time: 02/19/24 1034  Consult Orders   Inpatient Palliative Care Team Consult [[id number]] ordered by Shweta Blanc DO at 02/17/24 1327          Attested           Attestation signed by Mich Anders MD at 02/20/24 2114     I have reviewed this documentation and agree.  Palliative care team is visiting and supporting patient and family physically, mentally, emotionally, and spiritually.  Patient and family have decided on DNR/DNI status and all treatment should be geared towards comfort measures only at this time.  We will continue to follow and be supportive in any way that we can.  Hospice consulted.  They will be following.  Thank you for this consult.              "  Expand All Collapse All    Palliative Care Consultation     Patient Name: Jersey Celaya  : 1955  MRN: [id number]  Allergies: Pregabalin and Gluten meal     Requesting clinician:  Rommel  Reason for consult: Consultation for clarification of goals of care and code status.        Patient Code Status:       Code Status and Medical Interventions:   Ordered at: 24 1032     Medical Intervention Limits:     NO intubation (DNI)     Code Status (Patient has no pulse and is not breathing):     No CPR (Do Not Attempt to Resuscitate)     Medical Interventions (Patient has pulse or is breathing):     Limited Support               Chief Complaint:     Fall      History of Present Illness     Jersey Celaya is a 68 y.o. male who presented to [facility] ED on  with reports of a fall. Patient minimally able to participate in conversation at time of presentation due to clinical status and baseline medical history.      In ED: WBC 12.3, Platelets 217, Glucose 103, Na 132, ALT 50, Alk phos 213, proBNP 4195     UA with protein, ketones, blood     CT head was obtained which showed \"Findings suspicious for a mass in the posterior left periventricular white matter with surrounding edema, and mass effect. There is effacement of the posterior left lateral ventricle, effacement of the left posterior cerebral sulci, and 2-3 mm rightward midline shift. Finding is indeterminate but concerning for neoplasm or abscess. MRI with and without contrast is recommended for further evaluation. 2.There appears to be slight peripheral hyperdensity of the mass, and a component of hemorrhage cannot be excluded. This does not appear typical of an acute intraparenchymal hemorrhage, and no other hemorrhage is seen at this time.  3.Opacification of the right mastoid air cells and middle ear spaces with absence of the ossicles, as before. There is also hyperdense attenuation in the judah of the right external auditory canal and posteriorly " "along the right mastoid. Findings could be  related to chronic otomastoiditis with possible postoperative changes on the right.  4.Paranasal sinus mucosal thickening with trace air-fluid levels which could indicate acute sinusitis\".       Neurosurgery consulted, recommended MRI. Started on Keppra and steroids.                  VITAL SIGNS:   Temp:  [97.8 °F (36.6 °C)-99.2 °F (37.3 °C)] 99.2 °F (37.3 °C)  Heart Rate:  [] 128  Resp:  [20-24] 20  BP: ()/(55-81) 102/55         PMH: CAD, COPD, Depression, HLD, HTN     Surgical History         Past Surgical History:   Procedure Laterality Date    CARDIAC CATHETERIZATION N/A 3/22/2021     Procedure: Left Heart Cath with angiogram;  Surgeon: Mack French MD;  Location:  [facility] CATH INVASIVE LOCATION;  Service: Cardiovascular;  Laterality: N/A;    CARDIAC CATHETERIZATION N/A 3/22/2021     Procedure: Left ventriculography;  Surgeon: Mack French MD;  Location: [facility] CATH INVASIVE LOCATION;  Service: Cardiovascular;  Laterality: N/A;    CERVICAL SPINE SURGERY        INNER EAR SURGERY        WISDOM TOOTH EXTRACTION                      Family History   Problem Relation Age of Onset    Cancer Mother      Hypertension Mother      Cancer Father           Social History            Tobacco Use    Smoking status: Every Day       Packs/day: .5       Types: Cigarettes    Smokeless tobacco: Never   Vaping Use    Vaping Use: Never used   Substance Use Topics    Alcohol use: Never    Drug use: Yes       Types: Amphetamines, Marijuana       Comment: last used meth smoked 1/16/24               LABS:          Results from last 7 days   Lab Units 02/18/24  0556   WBC 10*3/mm3 16.60*   HEMOGLOBIN g/dL 11.8*   HEMATOCRIT % 37.4*   PLATELETS 10*3/mm3 127*           Results from last 7 days   Lab Units 02/19/24  0640   SODIUM mmol/L 154*   POTASSIUM mmol/L 4.4   CHLORIDE mmol/L 117*   CO2 mmol/L 27.0   BUN mg/dL 70*   CREATININE mg/dL 1.09   GLUCOSE mg/dL 170*   CALCIUM mg/dL 8.4* "           Results from last 7 days   Lab Units 02/19/24  0640   SODIUM mmol/L 154*   POTASSIUM mmol/L 4.4   CHLORIDE mmol/L 117*   CO2 mmol/L 27.0   BUN mg/dL 70*   CREATININE mg/dL 1.09   CALCIUM mg/dL 8.4*   BILIRUBIN mg/dL 0.4   ALK PHOS U/L 96   ALT (SGPT) U/L 55*   AST (SGOT) U/L 27   GLUCOSE mg/dL 170*            IMAGING STUDIES:  XR Abdomen KUB     Result Date: 2/19/2024  Impression: Enteric tube within the stomach. Electronically Signed: Venita Mcwilliams MD  2/19/2024 2:48 AM EST  Workstation ID: [id number]     MRI Brain Without Contrast     Result Date: 2/17/2024  Extremely limited examination for assessment of treatment response. There is significant patient motion in most of the imaging sequences, and IV contrast was not administered as the patient was unable to sign contrast consent. Complex mass lesion is seen centered about the left parieto-occipital region crossing the midline along the splenium of the corpus callosum. Extensive surrounding vasogenic edema, most pronounced in the left parieto-occipital region. The dominant left-sided component measuring 5.9 x 2.7 cm and the smaller right-sided component measuring approximately 2.8 x 1.5 cm. This appears similar or slightly smaller in comparison with 2/13/2024. Of note, the SWI signal hypointensity within the lesion has decreased and is now relatively confined to the periphery of the lesion. 3 to 4 mm left right midline shift is again suggested. No findings to suggest acute infarct. Consider repeat examination, possibly utilizing patient sedation. Please see above for additional details. Electronically Signed: Toni De La Fuente MD  2/17/2024 5:33 PM EST  Workstation ID: [id number]         I reviewed the patient's new clinical results including labs, imaging, and vitals.           Scheduled Meds:  citalopram, 20 mg, Nasogastric, Nightly  dexAMETHasone, 4 mg, Intravenous, Q6H  insulin lispro, 2-9 Units, Subcutaneous, Q6H  levETIRAcetam, 500 mg, Oral,  "Q12H  metoprolol tartrate, 25 mg, Nasogastric, Q12H  midazolam, 2 mg, Intravenous, Once  sodium chloride, 10 mL, Intravenous, Q12H        Continuous Infusions:        I have reviewed patient's current medication list.      Review of Systems   Unable to perform ROS: Mental status change   All other systems reviewed and are negative.           Physical Exam  Vitals and nursing note reviewed.   Constitutional:       Appearance: He is ill-appearing.   HENT:      Head: Normocephalic and atraumatic.      Nose: Nose normal.      Mouth/Throat:      Mouth: Mucous membranes are dry.      Pharynx: Oropharynx is clear.   Eyes:      Conjunctiva/sclera: Conjunctivae normal.   Cardiovascular:      Rate and Rhythm: Tachycardia present.      Pulses: Normal pulses.   Pulmonary:      Effort: Pulmonary effort is normal.   Abdominal:      Palpations: Abdomen is soft.   Musculoskeletal:         General: Normal range of motion.      Cervical back: Normal range of motion.   Skin:     General: Skin is dry.      Coloration: Skin is pale.   Neurological:      General: No focal deficit present.      Mental Status: He is alert.                  PROBLEM LIST:    AMS (altered mental status)    Severe malnutrition              ASSESSMENT/PLAN:     Altered mental status: CT head was obtained which showed \"Findings suspicious for a mass in the posterior left periventricular white matter with surrounding edema, and mass effect. There is effacement of the posterior left lateral ventricle, effacement of the left posterior cerebral sulci, and 2-3 mm rightward midline shift. Finding is indeterminate but concerning for neoplasm or abscess. MRI with and without contrast is recommended for further evaluation. 2.There appears to be slight peripheral hyperdensity of the mass, and a component of hemorrhage cannot be excluded. This does not appear typical of an acute intraparenchymal hemorrhage, and no other hemorrhage is seen at this time.  3.Opacification of " "the right mastoid air cells and middle ear spaces with absence of the ossicles, as before. There is also hyperdense attenuation in the judah of the right external auditory canal and posteriorly along the right mastoid. Findings could be  related to chronic otomastoiditis with possible postoperative changes on the right.  4.Paranasal sinus mucosal thickening with trace air-fluid levels which could indicate acute sinusitis\".  Started on IV keppra and steroids. Neurosurgery consulted, also hem onc. Likely high grade glio.      Rhabdomyolysis: with multiple falls. CK 4195. On IV fluids.      HTN/COPD/CAD/Anxiety: chronic conditions per primary.      These illnesses and their management contribute to the need for a palliative consult and advanced care planning.       ADVANCED CARE PLANNIN/19 Patient is a DNR/DNI. Per family, patient has been steadily declining for several months now, that he is minimally able to do things on his own. They are not interested in extensive workup and are interested in hospice services for the patient. [facility] hosp[facility] following, planning to meet with family this am.         Advanced Directives: Patient does not have advance directive  Health Care Directive on file:  No  Health Care Surrogate:      Palliative Performance Scale Score:    Comments:               Decisional Capacity: no  Patient's understanding of illness: unknown  Patient goals of care:  DNR/DNI        Thank you for this consult and allowing us to participate in patient's plan of care. Palliative Care Team will continue to follow patient.         I spent 48 minutes reviewing providers documentation, medication records, assessing and examining patient, discussing with family, answering questions, providing guidance about a plan of care, and coordinating care with other healthcare members. More than 50% of time spent face to face discussing disease education, current clinical status, and medication management.       "   Sharita Wasserman, APRN  2/19/2024            Cosigned by: Mich Anders MD at 02/20/24 0754     Revision History

## 2024-02-20 NOTE — CASE MANAGEMENT/SOCIAL WORK
Continued Stay Note   Mika     Patient Name: Jersey Celaya  MRN: [id number]  Today's Date: 2/20/2024    Admit Date: 2/12/2024    Plan: Patient lives alone, pending clinical course and PT/OT eval. SNF preference [facility]. Precert required. PASRR approved.   Discharge Plan       Row Name 02/20/24 0920       Plan    Plan Patient lives alone, pending clinical course and PT/OT eval. SNF preference [facility]. Precert required. PASRR approved.    Plan Comments DC Barrier: NG, [facility] following (not GIP appropriate at this time), PICC, repeat MRI Brain pending (?Lymphoma), possible biopsy mass, Neuro Sx/Palliative/HemOnc/Cardio/Nephrology following.               Expected Discharge Date and Time       Expected Discharge Date Expected Discharge Time    Feb 20, 2024               MARA Bocanegra RN  SIPS/ICU   O: [phone]  C: [phone]  [email or web address]

## 2024-02-20 NOTE — PROGRESS NOTES
PROGRESS NOTE      Patient Name: Jersey Celaya  : 1955  MRN: [id number]  Primary Care Physician: Shailesh Thapa MD  Date of admission: 2024    Patient Care Team:  Shailesh Thapa MD as PCP - General  Mack French MD as Consulting Physician (Cardiology)        Subjective   Subjective:     Patient feel same Not responding  Review of systems:  NA      Allergies:    Allergies   Allergen Reactions    Pregabalin Swelling    Gluten Meal GI Intolerance       Objective   Exam:     Vital Signs  Temp:  [96.6 °F (35.9 °C)-97.6 °F (36.4 °C)] 97.4 °F (36.3 °C)  Heart Rate:  [] 88  Resp:  [18-20] 18  BP: ()/(55-79) 89/65  SpO2:  [87 %-96 %] 96 %  on   ;   Device (Oxygen Therapy): room air  Body mass index is 16.86 kg/m².    General:  elderly   male in no acute distress.    Head:      Normocephalic and atraumatic.    Eyes:      PERRL/EOM intact, conjunctiva and sclera clear with out nystagmus.    Neck:      No masses, thyromegaly,  trachea central with normal respiratory effort   Lungs:    Clear bilaterally to auscultation.    Heart:      Regular rate and rhythm, no murmur no gallop  Abd:        Soft, nontender, not distended, bowel sounds positive, no shifting dullness   Pulses:   Pulses palpable  Extr:        No cyanosis or clubbing--no edema.    Neuro:    No focal deficits.   alert oriented x3  Skin:       Intact without lesions or rashes.    Psych:    Alert and cooperative; normal mood and affect; .      Results Review:  I have personally reviewed most recent Data :  CBC    Results from last 7 days   Lab Units 24  0418 24  1031 24  0556 24  0601 24  0354 02/15/24  0441 24  0551   WBC 10*3/mm3 12.90* 14.00* 16.60* 12.20* 8.00 7.80 13.40*   HEMOGLOBIN g/dL 11.0* 12.2* 11.8* 11.0* 10.6* 9.7* 9.8*   PLATELETS 10*3/mm3 62* 95* 127* 189 196 207 199     CMP   Results from last 7 days   Lab Units 24  1210 24  0418 24  2135 24  1638  02/19/24  0640 02/18/24  2121 02/18/24  0556 02/17/24  0750 02/17/24  0601 02/17/24  0003 02/16/24  2048 02/16/24  1212 02/16/24  0810 02/16/24  0354 02/15/24  0827 02/15/24  0441 02/14/24  0801 02/14/24  0551   SODIUM mmol/L 152*  153* 156* 157* 156* 154* 153* 149*  149*   < > 146*   < >  --  145   < > 147*   < > 141   < > 136   POTASSIUM mmol/L 4.8 4.8  --  3.6 4.4  --  4.3  --  4.2  --  4.5 3.6   < > 4.3   < > 4.0   < > 4.5   CHLORIDE mmol/L 118* 120*  --  125* 117*  --  114*  --  112*  --   --  110*   < > 112*   < > 107   < > 101   CO2 mmol/L 26.0 27.0  --  23.0 27.0  --  28.0  --  28.0  --   --  27.0   < > 24.0   < > 25.0   < > 23.0   BUN mg/dL 53* 60*  --  61* 70*  --  47*  --  36*  --   --  28*   < > 31*   < > 29*   < > 26*   CREATININE mg/dL 0.80 0.92  --  0.87 1.09  --  0.72*  --  0.73*  --   --  0.57*   < > 0.63*   < > 0.67*   < > 0.64*   GLUCOSE mg/dL 156* 157*  --  129* 170*  --  150*  --  152*  --   --  150*   < > 142*   < > 151*   < > 127*   ALBUMIN g/dL  --  3.2*  --   --  3.3*  --  3.5  --  3.7  --   --   --   --  3.4*  --  3.5  --  3.5   BILIRUBIN mg/dL  --  0.5  --   --  0.4  --  0.4  --  0.4  --   --   --   --  0.5  --  0.6  --  0.7   ALK PHOS U/L  --  97  --   --  96  --  103  --  121*  --   --   --   --  143*  --  148*  --  168*   AST (SGOT) U/L  --  21  --   --  27  --  40  --  50*  --   --   --   --  89*  --  136*  --  145*   ALT (SGPT) U/L  --  44*  --   --  55*  --  70*  --  78*  --   --   --   --  96*  --  98*  --  74*    < > = values in this interval not displayed.     ABG      XR Abdomen KUB    Result Date: 2/19/2024  Impression: Enteric tube within the stomach. Electronically Signed: Venita Mcwilliams MD  2/19/2024 2:48 AM EST  Workstation ID: [id number]     Results for orders placed during the hospital encounter of 01/17/24    Adult Transthoracic Echo Complete W/ Cont if Necessary Per Protocol    Interpretation Summary    Left ventricular ejection fraction appears to be 56 -  60%.    Scheduled Meds:citalopram, 20 mg, Nasogastric, Nightly  dexAMETHasone, 4 mg, Intravenous, Q6H  insulin lispro, 2-9 Units, Subcutaneous, Q6H  levETIRAcetam, 500 mg, Oral, Q12H  metoprolol tartrate, 25 mg, Nasogastric, Q12H  midazolam, 2 mg, Intravenous, Once  sodium chloride, 10 mL, Intravenous, Q12H      Continuous Infusions:sodium chloride, 125 mL/hr, Last Rate: 125 mL/hr (02/20/24 1242)      PRN Meds:  senna-docusate sodium **AND** polyethylene glycol **AND** bisacodyl **AND** bisacodyl    Calcium Replacement - Follow Nurse / BPA Driven Protocol    dextrose    dextrose    glucagon (human recombinant)    hydrALAZINE    HYDROmorphone    Magnesium Standard Dose Replacement - Follow Nurse / BPA Driven Protocol    metoprolol tartrate    midazolam    Morphine    nitroglycerin    ondansetron ODT **OR** ondansetron    phenol    Phosphorus Replacement - Follow Nurse / BPA Driven Protocol    Potassium Replacement - Follow Nurse / BPA Driven Protocol    [COMPLETED] Insert Peripheral IV **AND** sodium chloride    sodium chloride    sodium chloride    sodium chloride    temazepam    Assessment & Plan   Assessment and Plan:         AMS (altered mental status)    Severe malnutrition    ASSESSMENT:  Hypernatremia  Acute kidney injury at risk with greater than 30% increase in creatinine in last 24 hours  Brain tumor  History of hypertension  History of coronary artery disease    PLAN :     Significant fluctuation of the sodium seem to be free water deficit  Free water was stopped as patient  was aspirating  Started on 1/2 NS at this time and dose increased to 125ml/hr  palliative /hospice on case due to poor prognosis trying to contact family  Feel better NAD   We will try to maintain sodium between 140-1 45 because of the cerebral edema  If needed 3% saline will be given on as needed basis but not needed           Electronically signed by Mode Mckinley MD,   [facility] kidney consultant  [phone]  2/20/2024  17:02  EST

## 2024-02-21 PROBLEM — Z51.5 END OF LIFE CARE: Status: ACTIVE | Noted: 2024-01-01

## 2024-02-21 NOTE — CASE MANAGEMENT/SOCIAL WORK
Case Management Discharge Note      Final Note: [facility]         Selected Continued Care - Discharged on 2/20/2024 Admission date: 2/12/2024 - Discharge disposition: Hospice/Medical Facility ([facility])       Selected Continued Care - Prior Encounters Includes continued care and service providers with selected services from prior encounters from 11/14/2023 to 2/20/2024      Discharged on 2/5/2024 Admission date: 2/5/2024 - Discharge disposition: Home or Self Care      Home Medical Care       Service Provider Selected Services Address Phone Fax Patient Preferred    [facility] [place] [facility] [address] [place] [address] [phone] --                            Final Discharge Disposition Code: 51 - hospice medical facility      MARA Bocanegra RN  SIPS/ICU   O: [phone]  C: [phone]  [email or web address]     Pt inform that dr sent in some Plaxcovid and put in a order for Infusion.Pt states she did not pickup med yet but she is still feeling bad

## 2024-02-21 NOTE — PLAN OF CARE
Goal Outcome Evaluation:                                    Patient remains in palliative/hospice care. Medications corrected/updated. [facility] visited several times. Patient was flailing/uncomfortable most of shift until meds adjusted.    [facility] Nurse: Olamide Manning -[phone]  [facility] : Lee Johnson [phone]

## 2024-02-21 NOTE — CASE MANAGEMENT/SOCIAL WORK
Discharge Planning Assessment  [facility]     Patient Name: Jersey Celaya  MRN: [id number]  Today's Date: 2/21/2024    Admit Date: 2/20/2024    Plan: GIP - [facility]   Discharge Needs Assessment    No documentation.                  Discharge Plan       Row Name 02/21/24 0919       Plan    Plan GIP - [facility]    Plan Comments GIP - [facility]               Selected Continued Care - Prior Encounters Includes continued care and service providers with selected services from prior encounters from 11/22/2023 to 2/21/2024      Discharged on 2/5/2024 Admission date: 2/5/2024 - Discharge disposition: Home or Self Care      Home Medical Care       Service Provider Selected Services Address Phone Fax Patient Preferred    [facility] [place] [address] [phone] --                      Discharged on 1/18/2024 Admission date: 1/17/2024 - Discharge disposition: Home or Self Care      Durable Medical Equipment       Service Provider Selected Services Address Phone Fax Patient Preferred    [facility] [address] [facility] [address] [address] [phone] --                          Expected Discharge Date and Time       Expected Discharge Date Expected Discharge Time    Feb 22, 2024         MARA Bocanegra RN  SIPS/ICU   O: [phone]  C: [phone]  [email or web address]

## 2024-02-21 NOTE — H&P
[facility]   HISTORY AND PHYSICAL    Patient Name: Jersey Celaya  : 1955  MRN: [id number]  Primary Care Physician:  Shailesh Thapa MD  Date of admission: 2024    Subjective   Subjective     Chief Complaint: GIB status under hospice    HPI:    Jersey Celaya is a 68 y.o. male patient admitted with acute metabolic encephalopathy patient had MRI consistent was brain tumor assumed to be glioma  Patient was not a surgical candidate  Patient was treated with Decadron and Keppra and hypertonic saline  Patient was hyponatremic and followed up per nephrology  Patient was discharged to inpatient hospice status    Review of Systems   Unable from the patient due to mental status    Personal History     Past Medical History:   Diagnosis Date   • CAD (coronary artery disease)    • COPD (chronic obstructive pulmonary disease)    • Depression    • Depression    • Dizziness    • Falls    • Hyperlipidemia    • Hypertension    • Low back pain        Past Surgical History:   Procedure Laterality Date   • CARDIAC CATHETERIZATION N/A 3/22/2021    Procedure: Left Heart Cath with angiogram;  Surgeon: Mack French MD;  Location: [facility] CATH INVASIVE LOCATION;  Service: Cardiovascular;  Laterality: N/A;   • CARDIAC CATHETERIZATION N/A 3/22/2021    Procedure: Left ventriculography;  Surgeon: Mack French MD;  Location: [facility] CATH INVASIVE LOCATION;  Service: Cardiovascular;  Laterality: N/A;   • CERVICAL SPINE SURGERY     • INNER EAR SURGERY     • WISDOM TOOTH EXTRACTION         Family History: family history includes Cancer in his father and mother; Hypertension in his mother. Otherwise pertinent FHx was reviewed and not pertinent to current issue.    Social History:  reports that he has been smoking cigarettes. He has been smoking an average of .5 packs per day. He has never used smokeless tobacco. He reports current drug use. Drugs: Amphetamines and Marijuana. He reports that he does not drink  alcohol.    Home Medications:  albuterol sulfate HFA, aspirin, atorvastatin, citalopram, diphenoxylate-atropine, ferrous sulfate, fluticasone, gabapentin, meloxicam, metoprolol succinate XL, mirtazapine, tamsulosin, temazepam, tiZANidine, and vitamin B-12    Allergies:  Allergies   Allergen Reactions   • Pregabalin Swelling   • Gluten Meal GI Intolerance       Objective   Objective     Vitals:   Temp:  [96.6 °F (35.9 °C)-97.6 °F (36.4 °C)] 97.2 °F (36.2 °C)  Heart Rate:  [] 89  Resp:  [16-18] 18  BP: ()/(55-70) 99/69  Physical Exam    Constitutional: Agitated.   Eyes: PERRLA, sclerae anicteric, no conjunctival injection   HENT: NCAT, mucous membranes moist   Neck: Supple, no thyromegaly, no lymphadenopathy, trachea midline   Respiratory: Coarse in the bases   Cardiovascular: RRR, no murmurs, rubs, or gallops, palpable pedal pulses bilaterally   Gastrointestinal: Positive bowel sounds, soft, nontender, nondistended   Musculoskeletal: No bilateral ankle edema, no clubbing or cyanosis to extremities   Psychiatric sedated move all extremities spontaneously  Result Review    Result Review:  I have personally reviewed the results from the time of this admission to 2/21/2024 07:45 EST and agree with these findings:  [x]  Laboratory list / accordion  [x]  Microbiology  []  Radiology  []  EKG/Telemetry   []  Cardiology/Vascular   []  Pathology  []  Old records  []  Other:  Most notable findings include:       Assessment & Plan   Assessment / Plan     Brief Patient Summary:  Jersey Celaya is a 68 y.o. male who to GIP hospice status with change mental status encephalopathy with underlying brain tumor in the left parietal consistent with glioma  Patient was transferred to hospice palliative care  Is DNR comfort measures  Patient with history of coronary disease and an acute on chronic renal insufficiency with multiple falls  Comfort measures for now    Active Hospital Problems:  There are no active hospital  problems to display for this patient.    Plan:     Comfort measures with morphine and Versed continue IV fluids  DVT prophylaxis:  No DVT prophylaxis order currently exists.        CODE STATUS:       Admission Status:  I believe this patient meets ip status.    Stephen Carney MD

## 2024-02-22 NOTE — DISCHARGE SUMMARY
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  Admitted with change mental status and encephalopathy  Patient underlying dementia  Date of Discharge:    Primary Care Physician: Shailesh Thapa MD        Presenting Problem:   Fall, initial encounter [W19.XXXA]  Traumatic rhabdomyolysis, initial encounter [T79.6XXA]  Altered mental status, unspecified altered mental status type [R41.82]  AMS (altered mental status) [R41.82]     Active and Resolved Hospital Problems:       Active Hospital Problems     Diagnosis POA   • **AMS (altered mental status) [R41.82] Yes   • Severe malnutrition [E43] Yes       Resolved Hospital Problems   No resolved problems to display.            Hospital Course      Hospital Course:  Jersey Celaya is a 68 y.o. male patient was hospitalized as inpatient till  and then transferred to [facility] status  Patient continue comfort measures  Patient was diagnosed with brain tumor on L portable  Family decided to go with comfort measures and hospice  Patient  on  after midnight  Reason for death multiorgan failure with brain tumor and underlying COPD           DISCHARGE Follow Up Recommendations for labs and diagnostics: pt            Reasons For Change In Medications and Indications for New Medications:        Day of Discharge      Vital Signs:  Heart Rate:  [110-125] 117  BP: (92)/(62) 92/62           Pertinent  and/or Most Recent Results      LAB RESULTS:              Lab 24  0418 24  1031 24  0556 24  0601 24  0354   WBC 12.90* 14.00* 16.60* 12.20* 8.00   HEMOGLOBIN 11.0* 12.2* 11.8* 11.0* 10.6*   HEMATOCRIT 35.6* 38.9 37.4* 34.8* 33.4*   PLATELETS 62* 95* 127* 189 196   NEUTROS ABS 10.45* 11.50* 15.20* 10.90*  6.70   LYMPHS ABS  --  0.60* 0.30* 0.20* 0.20*   MONOS ABS  --  1.90* 1.10* 1.00* 1.00*   EOS ABS  --  0.00 0.00 0.00 0.00   .8* 103.0* 102.7* 101.4* 102.3*                          Lab 02/20/24  1210 02/20/24  0418 02/19/24  2135 02/19/24  1637 02/19/24  0640 02/18/24  2121 02/18/24  0556 02/17/24 2020 02/17/24  1441 02/17/24  0750 02/17/24  0601 02/16/24  0810 02/16/24  0354   SODIUM 152*  153* 156* 157* 156* 154*   < > 149*  149*   < >  --    < > 146*   < > 147*   POTASSIUM 4.8 4.8  --  3.6 4.4  --  4.3  --   --   --  4.2   < > 4.3   CHLORIDE 118* 120*  --  125* 117*  --  114*  --   --   --  112*   < > 112*   CO2 26.0 27.0  --  23.0 27.0  --  28.0  --   --   --  28.0   < > 24.0   ANION GAP 8.0 9.0  --  8.0 10.0  --  7.0  --   --   --  6.0   < > 11.0   BUN 53* 60*  --  61* 70*  --  47*  --   --   --  36*   < > 31*   CREATININE 0.80 0.92  --  0.87 1.09  --  0.72*  --   --   --  0.73*   < > 0.63*   EGFR 97.0 91.2  --  94.6 74.4  --  100.1  --   --   --  99.7   < > 104.3   GLUCOSE 156* 157*  --  129* 170*  --  150*  --   --   --  152*   < > 142*   CALCIUM 8.2* 8.0*  --  6.5* 8.4*  --  8.4*  --   --   --  8.6   < > 8.5*   MAGNESIUM  --  2.4  --   --  2.5*  --  2.3  --   --   --  2.1  --  2.0   PHOSPHORUS  --  4.6*  --   --  4.3  --  3.4  --  3.6  --  2.1*   < > 2.1*    < > = values in this interval not displayed.                  Lab 02/20/24  0418 02/19/24  0640 02/18/24  0556 02/17/24  0601 02/16/24  0354   TOTAL PROTEIN 4.8* 5.0* 5.1* 5.5* 5.6*   ALBUMIN 3.2* 3.3* 3.5 3.7 3.4*   GLOBULIN 1.6 1.7 1.6 1.8 2.2   ALT (SGPT) 44* 55* 70* 78* 96*   AST (SGOT) 21 27 40 50* 89*   BILIRUBIN 0.5 0.4 0.4 0.4 0.5   ALK PHOS 97 96 103 121* 143*                      Brief Urine Lab Results  (Last result in the past 365 days)          Color   Clarity   Blood   Leuk Est   Nitrite   Protein   CREAT   Urine HCG         02/12/24 1931 Yellow    Clear    Large (3+)    Negative    Negative    30 mg/dL (1+)                           Microbiology Results (last 10 days)         Procedure Component Value - Date/Time     MRSA Screen, PCR (Inpatient) - Swab, Nares [[id number]]  (Normal) Collected: 02/13/24 0419     Lab Status: Final result Specimen: Swab from Nares Updated: 02/13/24 0552       MRSA PCR No MRSA Detected     Narrative:       The negative predictive value of this diagnostic test is high and should only be used to consider de-escalating anti-MRSA therapy. A positive result may indicate colonization with MRSA and must be correlated clinically.                  Last 30 day radiology impressions   XR Abdomen KUB     Result Date: 2/19/2024  Impression: Impression: Enteric tube within the stomach. Electronically Signed: Venita Mcwilliams MD  2/19/2024 2:48 AM EST  Workstation ID: [id number]     MRI Brain Without Contrast     Result Date: 2/17/2024  Impression: Extremely limited examination for assessment of treatment response. There is significant patient motion in most of the imaging sequences, and IV contrast was not administered as the patient was unable to sign contrast consent. Complex mass lesion is seen centered about the left parieto-occipital region crossing the midline along the splenium of the corpus callosum. Extensive surrounding vasogenic edema, most pronounced in the left parieto-occipital region. The dominant left-sided component measuring 5.9 x 2.7 cm and the smaller right-sided component measuring approximately 2.8 x 1.5 cm. This appears similar or slightly smaller in comparison with 2/13/2024. Of note, the SWI signal hypointensity within the lesion has decreased and is now relatively confined to the periphery of the lesion. 3 to 4 mm left right midline shift is again suggested. No findings to suggest acute infarct. Consider repeat examination, possibly utilizing patient sedation. Please see above for additional details. Electronically Signed: Toni De La Fuente MD  2/17/2024 5:33 PM EST  Workstation ID: [id number]     CT Chest  With Contrast Diagnostic     Result Date: 2/16/2024  Impression: Impression: 1.No definitive metastatic disease in the chest, abdomen, or pelvis identified. 2.There is 7 mm nonspecific right hepatic lobe hypodensity, likely a cyst although too small to characterize. 3.Subacute to more chronic right-sided rib fractures. Correlate with patient history. Electronically Signed: Louis Richards MD  2/16/2024 11:27 AM EST  Workstation ID: [id number]     CT Abdomen Pelvis With Contrast     Result Date: 2/16/2024  Impression: Impression: 1.No definitive metastatic disease in the chest, abdomen, or pelvis identified. 2.There is 7 mm nonspecific right hepatic lobe hypodensity, likely a cyst although too small to characterize. 3.Subacute to more chronic right-sided rib fractures. Correlate with patient history. Electronically Signed: Louis Richards MD  2/16/2024 11:27 AM EST  Workstation ID: [id number]     XR Abdomen KUB     Result Date: 2/14/2024  Impression: Impression: Enteric tube tip in the antrum of the stomach. Electronically Signed: Louis Richards MD  2/14/2024 3:03 PM EST  Workstation ID: [id number]     XR Abdomen KUB     Result Date: 2/14/2024  Impression: Impression: Enteric tube tip in the proximal stomach. Electronically Signed: Louis Richards MD  2/14/2024 10:55 AM EST  Workstation ID: [id number]     XR Chest 1 View     Result Date: 2/14/2024  Impression: Impression: PICC line tip is at the brachiocephalic confluence. Electronically Signed: Chelly Crabtree MD  2/14/2024 9:58 AM EST  Workstation ID: [id number]     MRI Brain With & Without Contrast     Result Date: 2/13/2024  Impression: Impression: 1.Large mass centered in the left parietal white matter and extensively involving the corpus callosum crossing to the right parietal white matter with extensive surrounding signal abnormality concerning for infiltrating tumor. This is most consistent with a high-grade glioma. 2.There is extensive intratumoral susceptibility artifact  suggesting hemorrhagic component. 3.There is significant mass effect with sulcal effacement on the left and partial effacement of the right lateral ventricle. There is some enlargement of the left temporal horn, which could suggest a trapped component. 4.There is 4 mm rightward shift of midline structures due to mass effect. No herniation. 5.No evidence of acute infarct or acute intracranial hemorrhage. Electronically Signed: Dieter Quinones MD  2/13/2024 1:30 AM EST  Workstation ID: [id number]     CT Head Without Contrast     Result Date: 2/12/2024  Impression: Impression: 1.Findings suspicious for a mass in the posterior left periventricular white matter with surrounding edema, and mass effect. There is effacement of the posterior left lateral ventricle, effacement of the left posterior cerebral sulci, and 2-3 mm rightward midline shift. Finding is indeterminate but concerning for neoplasm or abscess. MRI with and without contrast is recommended for further evaluation. 2.There appears to be slight peripheral hyperdensity of the mass, and a component of hemorrhage cannot be excluded. This does not appear typical of an acute intraparenchymal hemorrhage, and no other hemorrhage is seen at this time. 3.Opacification of the right mastoid air cells and middle ear spaces with absence of the ossicles, as before. There is also hyperdense attenuation in the area of the right external auditory canal and posteriorly along the right mastoid. Findings could be  related to chronic otomastoiditis with possible postoperative changes on the right. 4.Paranasal sinus mucosal thickening with trace air-fluid levels which could indicate acute sinusitis. Results were called to the ordering provider by Dr. Louie at 2/12/2024 7:46 PM EST. Electronically Signed: Quincy Louie  2/12/2024 7:57 PM EST  Workstation ID: [id number]     CT Cervical Spine Without Contrast     Result Date: 2/12/2024  Impression: Impression: No evidence of acute fracture or  traumatic subluxation of the cervical spine. Postsurgical changes from C3-C6. Stable partially calcified right thyroid lobe nodule. Correlate with ultrasound if not already performed. Electronically Signed: Emil Norwood MD  2/12/2024 7:42 PM EST  Workstation ID: [id number]     XR Chest 1 View     Result Date: 2/12/2024  Impression: 1.No radiographic findings of acute cardiopulmonary abnormality. 2.Chronic fractures of the lateral right seventh and eighth ribs. Electronically Signed: Quincy Louie  2/12/2024 7:29 PM EST  Workstation ID: [id number]     XR Chest 1 View     Result Date: 2/5/2024  Impression: Impression: Mild vascular congestion.. Electronically Signed: Macario Mcclain MD  2/5/2024 4:18 PM EST  Workstation ID: [id number]                     Results for orders placed during the hospital encounter of 01/17/24     Adult Transthoracic Echo Complete W/ Cont if Necessary Per Protocol     Interpretation Summary  •  Left ventricular ejection fraction appears to be 56 - 60%.        Labs Pending at Discharge:        Procedures Performed           Consults:   Consults         Date and Time Order Name Status Description     2/18/2024  7:14 PM Inpatient Cardiology Consult Completed       2/14/2024  8:28 AM Hematology & Oncology Inpatient Consult Completed       2/12/2024  8:08 PM Neurosurgery (on-call MD unless specified) Completed       1/17/2024  1:31 PM Neurosurgery (on-call MD unless specified) Completed                     Discharge Details          Discharge Medications          ASK your doctor about these medications         Instructions Start Date   albuterol sulfate  (90 Base) MCG/ACT inhaler  Commonly known as: PROVENTIL HFA;VENTOLIN HFA;PROAIR HFA    2 puffs, Inhalation, Every 4 Hours PRN        aspirin 81 MG EC tablet    81 mg, Oral, Daily        atorvastatin 10 MG tablet  Commonly known as: LIPITOR    10 mg, Oral, Nightly        citalopram 40 MG tablet  Commonly known as: CeleXA    40 mg, Oral, Nightly         diphenoxylate-atropine 2.5-0.025 MG per tablet  Commonly known as: LOMOTIL    1 tablet, Oral, 2 Times Daily PRN        ferrous sulfate 324 MG tablet delayed-release    324 mg, Oral, Daily With Breakfast        fluticasone 50 MCG/ACT nasal spray  Commonly known as: FLONASE    1 spray, Nasal, Daily        gabapentin 300 MG capsule  Commonly known as: NEURONTIN    300 mg, Oral, 3 Times Daily        meloxicam 15 MG tablet  Commonly known as: MOBIC    15 mg, Oral, Daily PRN        metoprolol succinate XL 25 MG 24 hr tablet  Commonly known as: TOPROL-XL    25 mg, Oral, Daily        mirtazapine 15 MG tablet  Commonly known as: REMERON    15 mg, Oral, Nightly        tamsulosin 0.4 MG capsule 24 hr capsule  Commonly known as: FLOMAX    1 capsule, Oral, Nightly        temazepam 15 MG capsule  Commonly known as: RESTORIL    15 mg, Oral, Nightly PRN        tiZANidine 4 MG tablet  Commonly known as: ZANAFLEX    4 mg, Oral, Nightly        vitamin B-12 1000 MCG tablet  Commonly known as: CYANOCOBALAMIN    1,000 mcg, Oral, Daily                          Allergies   Allergen Reactions   • Pregabalin Swelling   • Gluten Meal GI Intolerance            Discharge Disposition: pt   Hospice/Medical Facility ([facility])     Diet:  Hospital:No active diet order           Discharge Activity:            CODE STATUS:      Code Status and Medical Interventions:   Ordered at: 24 1032     Medical Intervention Limits:     NO intubation (DNI)     Code Status (Patient has no pulse and is not breathing):     No CPR (Do Not Attempt to Resuscitate)     Medical Interventions (Patient has pulse or is breathing):     Limited Support            No future appointments.           Time spent on Discharge including face to face service:  35 minutes           Signature: Electronically signed by Stephen Carney MD, 24, 12:06 EST.  [facility]

## 2024-02-22 NOTE — PLAN OF CARE
Problem: Skin Injury Risk Increased  Goal: Skin Health and Integrity  Outcome: Unable to Meet, Plan Revised  Intervention: Optimize Skin Protection  Recent Flowsheet Documentation  Taken 2/21/2024 2130 by Radha Allen LPN  Pressure Reduction Techniques:   heels elevated off bed   positioned off wounds   pressure points protected   weight shift assistance provided  Pressure Reduction Devices: pressure-redistributing mattress utilized  Skin Protection:   adhesive use limited   incontinence pads utilized   silicone foam dressing in place   skin-to-skin areas padded   tubing/devices free from skin contact     Problem: Fall Injury Risk  Goal: Absence of Fall and Fall-Related Injury  Outcome: Unable to Meet, Plan Revised  Intervention: Identify and Manage Contributors  Recent Flowsheet Documentation  Taken 2/22/2024 0200 by Radha Allen LPN  Medication Review/Management: medications reviewed  Taken 2/22/2024 0000 by Radha Allen LPN  Medication Review/Management: medications reviewed  Taken 2/21/2024 2200 by Rahda Allen LPN  Medication Review/Management: medications reviewed  Taken 2/21/2024 2130 by Radha Allen LPN  Medication Review/Management: medications reviewed  Intervention: Promote Injury-Free Environment  Recent Flowsheet Documentation  Taken 2/22/2024 0200 by Radha Allen LPN  Safety Promotion/Fall Prevention:   fall prevention program maintained   safety round/check completed  Taken 2/22/2024 0000 by Radha Allen LPN  Safety Promotion/Fall Prevention:   fall prevention program maintained   safety round/check completed  Taken 2/21/2024 2200 by Radha Allen LPN  Safety Promotion/Fall Prevention:   lighting adjusted   safety round/check completed  Taken 2/21/2024 2130 by Radha Allen LPN  Safety Promotion/Fall Prevention:   lighting adjusted   safety round/check completed     Problem: Palliative Care  Goal: Enhanced Quality of Life  Outcome: Unable to Meet, Plan  Revised  Intervention: Maximize Comfort  Recent Flowsheet Documentation  Taken 2/21/2024 2130 by Radha Allen LPN  Pain Management Interventions:   unnecessary movement minimized   see MAR   quiet environment facilitated   position adjusted   pillow support provided   care clustered     Problem: Adult Inpatient Plan of Care  Goal: Plan of Care Review  Outcome: Unable to Meet, Plan Revised  Flowsheets (Taken 2/22/2024 0221)  Progress: declining  Plan of Care Reviewed With: patient  Goal: Patient-Specific Goal (Individualized)  Outcome: Unable to Meet, Plan Revised  Goal: Absence of Hospital-Acquired Illness or Injury  Outcome: Unable to Meet, Plan Revised  Intervention: Identify and Manage Fall Risk  Recent Flowsheet Documentation  Taken 2/22/2024 0200 by Radha Allen LPN  Safety Promotion/Fall Prevention:   fall prevention program maintained   safety round/check completed  Taken 2/22/2024 0000 by Radha Allen LPN  Safety Promotion/Fall Prevention:   fall prevention program maintained   safety round/check completed  Taken 2/21/2024 2200 by Radha Allen LPN  Safety Promotion/Fall Prevention:   lighting adjusted   safety round/check completed  Taken 2/21/2024 2130 by Radha Allen LPN  Safety Promotion/Fall Prevention:   lighting adjusted   safety round/check completed  Intervention: Prevent Skin Injury  Recent Flowsheet Documentation  Taken 2/21/2024 2130 by Radha Allen LPN  Skin Protection:   adhesive use limited   incontinence pads utilized   silicone foam dressing in place   skin-to-skin areas padded   tubing/devices free from skin contact  Intervention: Prevent and Manage VTE (Venous Thromboembolism) Risk  Recent Flowsheet Documentation  Taken 2/21/2024 2130 by Radha Allen LPN  VTE Prevention/Management:   bilateral   sequential compression devices off  Intervention: Prevent Infection  Recent Flowsheet Documentation  Taken 2/22/2024 0200 by Radha Allen LPN  Infection Prevention:    environmental surveillance performed   equipment surfaces disinfected   hand hygiene promoted   personal protective equipment utilized   rest/sleep promoted   single patient room provided  Taken 2/22/2024 0000 by Radha Allen LPN  Infection Prevention:   environmental surveillance performed   equipment surfaces disinfected   hand hygiene promoted   personal protective equipment utilized   rest/sleep promoted   single patient room provided  Taken 2/21/2024 2200 by Radha Allen LPN  Infection Prevention:   environmental surveillance performed   equipment surfaces disinfected   hand hygiene promoted   personal protective equipment utilized   rest/sleep promoted   single patient room provided  Taken 2/21/2024 2130 by Radha Allen LPN  Infection Prevention:   environmental surveillance performed   equipment surfaces disinfected   hand hygiene promoted   personal protective equipment utilized   rest/sleep promoted   single patient room provided  Goal: Optimal Comfort and Wellbeing  Outcome: Unable to Meet, Plan Revised  Intervention: Monitor Pain and Promote Comfort  Recent Flowsheet Documentation  Taken 2/21/2024 2130 by Radha Allen LPN  Pain Management Interventions:   unnecessary movement minimized   see MAR   quiet environment facilitated   position adjusted   pillow support provided   care clustered  Intervention: Provide Person-Centered Care  Recent Flowsheet Documentation  Taken 2/21/2024 2130 by Radha Allen LPN  Trust Relationship/Rapport:   care explained   reassurance provided  Goal: Readiness for Transition of Care  Outcome: Unable to Meet, Plan Revised   Goal Outcome Evaluation:  Plan of Care Reviewed With: patient        Progress: declining                                  Problem: Skin Injury Risk Increased  Goal: Skin Health and Integrity  Outcome: Unable to Meet, Plan Revised     Problem: Fall Injury Risk  Goal: Absence of Fall and Fall-Related Injury  Outcome: Unable to Meet, Plan  Revised     Problem: Palliative Care  Goal: Enhanced Quality of Life  Outcome: Unable to Meet, Plan Revised     Problem: Adult Inpatient Plan of Care  Goal: Plan of Care Review  Outcome: Unable to Meet, Plan Revised  Flowsheets (Taken 2/22/2024 0221)  Progress: declining  Plan of Care Reviewed With: patient  Goal: Patient-Specific Goal (Individualized)  Outcome: Unable to Meet, Plan Revised  Goal: Absence of Hospital-Acquired Illness or Injury  Outcome: Unable to Meet, Plan Revised  Goal: Optimal Comfort and Wellbeing  Outcome: Unable to Meet, Plan Revised  Goal: Readiness for Transition of Care  Outcome: Unable to Meet, Plan Revised

## 2024-02-22 NOTE — DISCHARGE SUMMARY
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  Admitted with change mental status and encephalopathy  Patient underlying dementia  Date of Discharge:    Primary Care Physician: Shailesh Thapa MD      Presenting Problem:   Fall, initial encounter [W19.XXXA]  Traumatic rhabdomyolysis, initial encounter [T79.6XXA]  Altered mental status, unspecified altered mental status type [R41.82]  AMS (altered mental status) [R41.82]    Active and Resolved Hospital Problems:  Active Hospital Problems    Diagnosis POA    **AMS (altered mental status) [R41.82] Yes    Severe malnutrition [E43] Yes      Resolved Hospital Problems   No resolved problems to display.         Hospital Course     Hospital Course:  Jersey Celaya is a 68 y.o. male patient was hospitalized as inpatient till  and then transferred to [facility] status  Patient continue comfort measures  Patient was diagnosed with brain tumor on L portable  Family decided to go with comfort measures and hospice  Patient  on  after midnight  Reason for death multiorgan failure with brain tumor and underlying COPD        DISCHARGE Follow Up Recommendations for labs and diagnostics: pt         Reasons For Change In Medications and Indications for New Medications:      Day of Discharge     Vital Signs:  Heart Rate:  [110-125] 117  BP: (92)/(62) 92/62        Pertinent  and/or Most Recent Results     LAB RESULTS:      Lab 24  0418 24  1031 24  0556 24  0601 24  0354   WBC 12.90* 14.00* 16.60* 12.20* 8.00   HEMOGLOBIN 11.0* 12.2* 11.8* 11.0* 10.6*   HEMATOCRIT 35.6* 38.9 37.4* 34.8* 33.4*   PLATELETS 62* 95* 127* 189 196   NEUTROS ABS 10.45* 11.50* 15.20* 10.90* 6.70    LYMPHS ABS  --  0.60* 0.30* 0.20* 0.20*   MONOS ABS  --  1.90* 1.10* 1.00* 1.00*   EOS ABS  --  0.00 0.00 0.00 0.00   .8* 103.0* 102.7* 101.4* 102.3*         Lab 02/20/24  1210 02/20/24  0418 02/19/24  2135 02/19/24  1637 02/19/24  0640 02/18/24  2121 02/18/24  0556 02/17/24 2020 02/17/24  1441 02/17/24  0750 02/17/24  0601 02/16/24  0810 02/16/24  0354   SODIUM 152*  153* 156* 157* 156* 154*   < > 149*  149*   < >  --    < > 146*   < > 147*   POTASSIUM 4.8 4.8  --  3.6 4.4  --  4.3  --   --   --  4.2   < > 4.3   CHLORIDE 118* 120*  --  125* 117*  --  114*  --   --   --  112*   < > 112*   CO2 26.0 27.0  --  23.0 27.0  --  28.0  --   --   --  28.0   < > 24.0   ANION GAP 8.0 9.0  --  8.0 10.0  --  7.0  --   --   --  6.0   < > 11.0   BUN 53* 60*  --  61* 70*  --  47*  --   --   --  36*   < > 31*   CREATININE 0.80 0.92  --  0.87 1.09  --  0.72*  --   --   --  0.73*   < > 0.63*   EGFR 97.0 91.2  --  94.6 74.4  --  100.1  --   --   --  99.7   < > 104.3   GLUCOSE 156* 157*  --  129* 170*  --  150*  --   --   --  152*   < > 142*   CALCIUM 8.2* 8.0*  --  6.5* 8.4*  --  8.4*  --   --   --  8.6   < > 8.5*   MAGNESIUM  --  2.4  --   --  2.5*  --  2.3  --   --   --  2.1  --  2.0   PHOSPHORUS  --  4.6*  --   --  4.3  --  3.4  --  3.6  --  2.1*   < > 2.1*    < > = values in this interval not displayed.         Lab 02/20/24  0418 02/19/24  0640 02/18/24  0556 02/17/24  0601 02/16/24  0354   TOTAL PROTEIN 4.8* 5.0* 5.1* 5.5* 5.6*   ALBUMIN 3.2* 3.3* 3.5 3.7 3.4*   GLOBULIN 1.6 1.7 1.6 1.8 2.2   ALT (SGPT) 44* 55* 70* 78* 96*   AST (SGOT) 21 27 40 50* 89*   BILIRUBIN 0.5 0.4 0.4 0.4 0.5   ALK PHOS 97 96 103 121* 143*                     Brief Urine Lab Results  (Last result in the past 365 days)        Color   Clarity   Blood   Leuk Est   Nitrite   Protein   CREAT   Urine HCG        02/12/24 1931 Yellow   Clear   Large (3+)   Negative   Negative   30 mg/dL (1+)                 Microbiology Results (last 10 days)        Procedure Component Value - Date/Time    MRSA Screen, PCR (Inpatient) - Swab, Nares [[id number]]  (Normal) Collected: 02/13/24 0419    Lab Status: Final result Specimen: Swab from Nares Updated: 02/13/24 0552     MRSA PCR No MRSA Detected    Narrative:      The negative predictive value of this diagnostic test is high and should only be used to consider de-escalating anti-MRSA therapy. A positive result may indicate colonization with MRSA and must be correlated clinically.            XR Abdomen KUB    Result Date: 2/19/2024  Impression: Impression: Enteric tube within the stomach. Electronically Signed: Venita Mcwilliams MD  2/19/2024 2:48 AM EST  Workstation ID: [id number]    MRI Brain Without Contrast    Result Date: 2/17/2024  Impression: Extremely limited examination for assessment of treatment response. There is significant patient motion in most of the imaging sequences, and IV contrast was not administered as the patient was unable to sign contrast consent. Complex mass lesion is seen centered about the left parieto-occipital region crossing the midline along the splenium of the corpus callosum. Extensive surrounding vasogenic edema, most pronounced in the left parieto-occipital region. The dominant left-sided component measuring 5.9 x 2.7 cm and the smaller right-sided component measuring approximately 2.8 x 1.5 cm. This appears similar or slightly smaller in comparison with 2/13/2024. Of note, the SWI signal hypointensity within the lesion has decreased and is now relatively confined to the periphery of the lesion. 3 to 4 mm left right midline shift is again suggested. No findings to suggest acute infarct. Consider repeat examination, possibly utilizing patient sedation. Please see above for additional details. Electronically Signed: Toni De La Fuente MD  2/17/2024 5:33 PM EST  Workstation ID: [id number]    CT Chest With Contrast Diagnostic    Result Date: 2/16/2024  Impression: Impression: 1.No definitive metastatic  disease in the chest, abdomen, or pelvis identified. 2.There is 7 mm nonspecific right hepatic lobe hypodensity, likely a cyst although too small to characterize. 3.Subacute to more chronic right-sided rib fractures. Correlate with patient history. Electronically Signed: Louis Richards MD  2/16/2024 11:27 AM EST  Workstation ID: [id number]    CT Abdomen Pelvis With Contrast    Result Date: 2/16/2024  Impression: Impression: 1.No definitive metastatic disease in the chest, abdomen, or pelvis identified. 2.There is 7 mm nonspecific right hepatic lobe hypodensity, likely a cyst although too small to characterize. 3.Subacute to more chronic right-sided rib fractures. Correlate with patient history. Electronically Signed: Louis Richards MD  2/16/2024 11:27 AM EST  Workstation ID: [id number]    XR Abdomen KUB    Result Date: 2/14/2024  Impression: Impression: Enteric tube tip in the antrum of the stomach. Electronically Signed: Louis Richards MD  2/14/2024 3:03 PM EST  Workstation ID: [id number]    XR Abdomen KUB    Result Date: 2/14/2024  Impression: Impression: Enteric tube tip in the proximal stomach. Electronically Signed: Louis Richards MD  2/14/2024 10:55 AM EST  Workstation ID: [id number]    XR Chest 1 View    Result Date: 2/14/2024  Impression: Impression: PICC line tip is at the brachiocephalic confluence. Electronically Signed: Chelly Crabtree MD  2/14/2024 9:58 AM EST  Workstation ID: [id number]    MRI Brain With & Without Contrast    Result Date: 2/13/2024  Impression: Impression: 1.Large mass centered in the left parietal white matter and extensively involving the corpus callosum crossing to the right parietal white matter with extensive surrounding signal abnormality concerning for infiltrating tumor. This is most consistent with a high-grade glioma. 2.There is extensive intratumoral susceptibility artifact suggesting hemorrhagic component. 3.There is significant mass effect with sulcal effacement on the left and partial  effacement of the right lateral ventricle. There is some enlargement of the left temporal horn, which could suggest a trapped component. 4.There is 4 mm rightward shift of midline structures due to mass effect. No herniation. 5.No evidence of acute infarct or acute intracranial hemorrhage. Electronically Signed: Dieter Quinones MD  2/13/2024 1:30 AM EST  Workstation ID: [id number]    CT Head Without Contrast    Result Date: 2/12/2024  Impression: Impression: 1.Findings suspicious for a mass in the posterior left periventricular white matter with surrounding edema, and mass effect. There is effacement of the posterior left lateral ventricle, effacement of the left posterior cerebral sulci, and 2-3 mm rightward midline shift. Finding is indeterminate but concerning for neoplasm or abscess. MRI with and without contrast is recommended for further evaluation. 2.There appears to be slight peripheral hyperdensity of the mass, and a component of hemorrhage cannot be excluded. This does not appear typical of an acute intraparenchymal hemorrhage, and no other hemorrhage is seen at this time. 3.Opacification of the right mastoid air cells and middle ear spaces with absence of the ossicles, as before. There is also hyperdense attenuation in the area of the right external auditory canal and posteriorly along the right mastoid. Findings could be  related to chronic otomastoiditis with possible postoperative changes on the right. 4.Paranasal sinus mucosal thickening with trace air-fluid levels which could indicate acute sinusitis. Results were called to the ordering provider by Dr. Louie at 2/12/2024 7:46 PM EST. Electronically Signed: Quincy Louie  2/12/2024 7:57 PM EST  Workstation ID: [id number]    CT Cervical Spine Without Contrast    Result Date: 2/12/2024  Impression: Impression: No evidence of acute fracture or traumatic subluxation of the cervical spine. Postsurgical changes from C3-C6. Stable partially calcified right thyroid  lobe nodule. Correlate with ultrasound if not already performed. Electronically Signed: Emil Norwood MD  2/12/2024 7:42 PM EST  Workstation ID: [id number]    XR Chest 1 View    Result Date: 2/12/2024  Impression: 1.No radiographic findings of acute cardiopulmonary abnormality. 2.Chronic fractures of the lateral right seventh and eighth ribs. Electronically Signed: Quincy Liban  2/12/2024 7:29 PM EST  Workstation ID: [id number]    XR Chest 1 View    Result Date: 2/5/2024  Impression: Impression: Mild vascular congestion.. Electronically Signed: Macario Mcclain MD  2/5/2024 4:18 PM EST  Workstation ID: [id number]             Results for orders placed during the hospital encounter of 01/17/24    Adult Transthoracic Echo Complete W/ Cont if Necessary Per Protocol    Interpretation Summary    Left ventricular ejection fraction appears to be 56 - 60%.      Labs Pending at Discharge:      Procedures Performed           Consults:   Consults       Date and Time Order Name Status Description    2/18/2024  7:14 PM Inpatient Cardiology Consult Completed     2/14/2024  8:28 AM Hematology & Oncology Inpatient Consult Completed     2/12/2024  8:08 PM Neurosurgery (on-call MD unless specified) Completed     1/17/2024  1:31 PM Neurosurgery (on-call MD unless specified) Completed               Discharge Details        Discharge Medications        ASK your doctor about these medications        Instructions Start Date   albuterol sulfate  (90 Base) MCG/ACT inhaler  Commonly known as: PROVENTIL HFA;VENTOLIN HFA;PROAIR HFA   2 puffs, Inhalation, Every 4 Hours PRN      aspirin 81 MG EC tablet   81 mg, Oral, Daily      atorvastatin 10 MG tablet  Commonly known as: LIPITOR   10 mg, Oral, Nightly      citalopram 40 MG tablet  Commonly known as: CeleXA   40 mg, Oral, Nightly      diphenoxylate-atropine 2.5-0.025 MG per tablet  Commonly known as: LOMOTIL   1 tablet, Oral, 2 Times Daily PRN      ferrous sulfate 324 MG tablet delayed-release    324 mg, Oral, Daily With Breakfast      fluticasone 50 MCG/ACT nasal spray  Commonly known as: FLONASE   1 spray, Nasal, Daily      gabapentin 300 MG capsule  Commonly known as: NEURONTIN   300 mg, Oral, 3 Times Daily      meloxicam 15 MG tablet  Commonly known as: MOBIC   15 mg, Oral, Daily PRN      metoprolol succinate XL 25 MG 24 hr tablet  Commonly known as: TOPROL-XL   25 mg, Oral, Daily      mirtazapine 15 MG tablet  Commonly known as: REMERON   15 mg, Oral, Nightly      tamsulosin 0.4 MG capsule 24 hr capsule  Commonly known as: FLOMAX   1 capsule, Oral, Nightly      temazepam 15 MG capsule  Commonly known as: RESTORIL   15 mg, Oral, Nightly PRN      tiZANidine 4 MG tablet  Commonly known as: ZANAFLEX   4 mg, Oral, Nightly      vitamin B-12 1000 MCG tablet  Commonly known as: CYANOCOBALAMIN   1,000 mcg, Oral, Daily               Allergies   Allergen Reactions    Pregabalin Swelling    Gluten Meal GI Intolerance         Discharge Disposition: pt   Hospice/Medical Facility ([facility])    Diet:  Hospital:No active diet order        Discharge Activity:         CODE STATUS:  Code Status and Medical Interventions:   Ordered at: 24 1032     Medical Intervention Limits:    NO intubation (DNI)     Code Status (Patient has no pulse and is not breathing):    No CPR (Do Not Attempt to Resuscitate)     Medical Interventions (Patient has pulse or is breathing):    Limited Support         No future appointments.        Time spent on Discharge including face to face service:  35 minutes        Signature: Electronically signed by Stephen Carney MD, 24, 12:[facility]

## 2024-02-23 NOTE — CASE MANAGEMENT/SOCIAL WORK
Case Management Discharge Note      Final Note:                           Final Discharge Disposition Code: 41 -  in medical facility

## 2024-03-02 LAB
QT INTERVAL: 420 MS
QTC INTERVAL: 466 MS

## (undated) DEVICE — CATH DIAG IMPULSE PIG .056 6F 110CM

## (undated) DEVICE — PINNACLE INTRODUCER SHEATH: Brand: PINNACLE

## (undated) DEVICE — PK TRY HEART CATH 50

## (undated) DEVICE — GW PTFE EMERALD HEPCOAT FC J TIP STD .035 3MM 150CM

## (undated) DEVICE — SKIN PREP TRAY W/CHG: Brand: MEDLINE INDUSTRIES, INC.

## (undated) DEVICE — CATH DIAG IMPULSE FR4 6F 100CM

## (undated) DEVICE — CATH DIAG IMPULSE FL4 6F 100CM